# Patient Record
Sex: FEMALE | Race: WHITE | NOT HISPANIC OR LATINO
[De-identification: names, ages, dates, MRNs, and addresses within clinical notes are randomized per-mention and may not be internally consistent; named-entity substitution may affect disease eponyms.]

---

## 2018-04-23 ENCOUNTER — RESULT REVIEW (OUTPATIENT)
Age: 78
End: 2018-04-23

## 2018-11-03 ENCOUNTER — RECORD ABSTRACTING (OUTPATIENT)
Age: 78
End: 2018-11-03

## 2018-11-03 DIAGNOSIS — C44.90 UNSPECIFIED MALIGNANT NEOPLASM OF SKIN, UNSPECIFIED: ICD-10-CM

## 2018-11-03 DIAGNOSIS — Z78.9 OTHER SPECIFIED HEALTH STATUS: ICD-10-CM

## 2018-11-03 DIAGNOSIS — Z80.3 FAMILY HISTORY OF MALIGNANT NEOPLASM OF BREAST: ICD-10-CM

## 2018-11-05 ENCOUNTER — APPOINTMENT (OUTPATIENT)
Dept: PULMONOLOGY | Facility: CLINIC | Age: 78
End: 2018-11-05
Payer: MEDICARE

## 2018-11-05 VITALS
HEART RATE: 55 BPM | OXYGEN SATURATION: 100 % | DIASTOLIC BLOOD PRESSURE: 79 MMHG | HEIGHT: 64 IN | WEIGHT: 130 LBS | BODY MASS INDEX: 22.2 KG/M2 | SYSTOLIC BLOOD PRESSURE: 158 MMHG | RESPIRATION RATE: 14 BRPM

## 2018-11-05 PROCEDURE — 94729 DIFFUSING CAPACITY: CPT

## 2018-11-05 PROCEDURE — 71046 X-RAY EXAM CHEST 2 VIEWS: CPT

## 2018-11-05 PROCEDURE — 94727 GAS DIL/WSHOT DETER LNG VOL: CPT

## 2018-11-05 PROCEDURE — 94060 EVALUATION OF WHEEZING: CPT

## 2018-11-05 PROCEDURE — 99214 OFFICE O/P EST MOD 30 MIN: CPT | Mod: 25

## 2018-11-05 RX ORDER — MOMETASONE FUROATE AND FORMOTEROL FUMARATE DIHYDRATE 100; 5 UG/1; UG/1
100-5 AEROSOL RESPIRATORY (INHALATION)
Refills: 0 | Status: DISCONTINUED | COMMUNITY
End: 2018-11-05

## 2018-11-08 ENCOUNTER — APPOINTMENT (OUTPATIENT)
Dept: UROLOGY | Facility: CLINIC | Age: 78
End: 2018-11-08
Payer: MEDICARE

## 2018-11-08 VITALS
DIASTOLIC BLOOD PRESSURE: 90 MMHG | WEIGHT: 130 LBS | HEIGHT: 64 IN | RESPIRATION RATE: 15 BRPM | TEMPERATURE: 97.5 F | OXYGEN SATURATION: 98 % | HEART RATE: 69 BPM | BODY MASS INDEX: 22.2 KG/M2 | SYSTOLIC BLOOD PRESSURE: 160 MMHG

## 2018-11-08 DIAGNOSIS — Z87.440 PERSONAL HISTORY OF URINARY (TRACT) INFECTIONS: ICD-10-CM

## 2018-11-08 DIAGNOSIS — Z86.39 PERSONAL HISTORY OF OTHER ENDOCRINE, NUTRITIONAL AND METABOLIC DISEASE: ICD-10-CM

## 2018-11-08 PROCEDURE — 99204 OFFICE O/P NEW MOD 45 MIN: CPT

## 2018-11-20 LAB
APPEARANCE: CLEAR
BACTERIA: NEGATIVE
BILIRUBIN URINE: NEGATIVE
BLOOD URINE: NEGATIVE
COLOR: YELLOW
GLUCOSE QUALITATIVE U: NEGATIVE MG/DL
HYALINE CASTS: 1 /LPF
KETONES URINE: NEGATIVE
LEUKOCYTE ESTERASE URINE: ABNORMAL
MICROSCOPIC-UA: NORMAL
NITRITE URINE: NEGATIVE
PH URINE: 6.5
PROTEIN URINE: NEGATIVE MG/DL
RED BLOOD CELLS URINE: 2 /HPF
SPECIFIC GRAVITY URINE: 1.02
SQUAMOUS EPITHELIAL CELLS: 2 /HPF
UROBILINOGEN URINE: NEGATIVE MG/DL
WHITE BLOOD CELLS URINE: 10 /HPF

## 2018-11-22 ENCOUNTER — MOBILE ON CALL (OUTPATIENT)
Age: 78
End: 2018-11-22

## 2018-11-25 ENCOUNTER — TRANSCRIPTION ENCOUNTER (OUTPATIENT)
Age: 78
End: 2018-11-25

## 2018-11-26 ENCOUNTER — EMERGENCY (EMERGENCY)
Facility: HOSPITAL | Age: 78
LOS: 1 days | Discharge: ANOTHER HOSPITAL | End: 2018-11-26
Attending: EMERGENCY MEDICINE
Payer: MEDICARE

## 2018-11-26 ENCOUNTER — EMERGENCY (EMERGENCY)
Facility: HOSPITAL | Age: 78
LOS: 1 days | Discharge: HOME | End: 2018-11-26
Attending: EMERGENCY MEDICINE
Payer: MEDICARE

## 2018-11-26 VITALS
SYSTOLIC BLOOD PRESSURE: 177 MMHG | HEART RATE: 62 BPM | RESPIRATION RATE: 16 BRPM | OXYGEN SATURATION: 97 % | TEMPERATURE: 98 F | DIASTOLIC BLOOD PRESSURE: 73 MMHG

## 2018-11-26 VITALS
TEMPERATURE: 98 F | OXYGEN SATURATION: 98 % | DIASTOLIC BLOOD PRESSURE: 113 MMHG | HEART RATE: 66 BPM | WEIGHT: 130.07 LBS | RESPIRATION RATE: 15 BRPM | HEIGHT: 64 IN | SYSTOLIC BLOOD PRESSURE: 201 MMHG

## 2018-11-26 VITALS
SYSTOLIC BLOOD PRESSURE: 175 MMHG | HEART RATE: 76 BPM | RESPIRATION RATE: 18 BRPM | OXYGEN SATURATION: 97 % | DIASTOLIC BLOOD PRESSURE: 92 MMHG

## 2018-11-26 LAB — BACTERIA UR CULT: ABNORMAL

## 2018-11-26 PROCEDURE — 70450 CT HEAD/BRAIN W/O DYE: CPT | Mod: 26

## 2018-11-26 PROCEDURE — 70486 CT MAXILLOFACIAL W/O DYE: CPT

## 2018-11-26 PROCEDURE — 70486 CT MAXILLOFACIAL W/O DYE: CPT | Mod: 26

## 2018-11-26 PROCEDURE — 70450 CT HEAD/BRAIN W/O DYE: CPT

## 2018-11-26 PROCEDURE — 90471 IMMUNIZATION ADMIN: CPT

## 2018-11-26 PROCEDURE — 99284 EMERGENCY DEPT VISIT MOD MDM: CPT | Mod: 25,GC

## 2018-11-26 PROCEDURE — 96374 THER/PROPH/DIAG INJ IV PUSH: CPT | Mod: XU

## 2018-11-26 PROCEDURE — 29125 APPL SHORT ARM SPLINT STATIC: CPT | Mod: GC

## 2018-11-26 PROCEDURE — 99285 EMERGENCY DEPT VISIT HI MDM: CPT | Mod: 25

## 2018-11-26 PROCEDURE — 11042 DBRDMT SUBQ TIS 1ST 20SQCM/<: CPT

## 2018-11-26 PROCEDURE — 90715 TDAP VACCINE 7 YRS/> IM: CPT

## 2018-11-26 RX ORDER — TETANUS TOXOID, REDUCED DIPHTHERIA TOXOID AND ACELLULAR PERTUSSIS VACCINE, ADSORBED 5; 2.5; 8; 8; 2.5 [IU]/.5ML; [IU]/.5ML; UG/.5ML; UG/.5ML; UG/.5ML
0.5 SUSPENSION INTRAMUSCULAR ONCE
Qty: 0 | Refills: 0 | Status: COMPLETED | OUTPATIENT
Start: 2018-11-26 | End: 2018-11-26

## 2018-11-26 RX ORDER — ACETAMINOPHEN 500 MG
650 TABLET ORAL ONCE
Qty: 0 | Refills: 0 | Status: COMPLETED | OUTPATIENT
Start: 2018-11-26 | End: 2018-11-26

## 2018-11-26 RX ORDER — CEFAZOLIN SODIUM 1 G
2000 VIAL (EA) INJECTION ONCE
Qty: 0 | Refills: 0 | Status: COMPLETED | OUTPATIENT
Start: 2018-11-26 | End: 2018-11-26

## 2018-11-26 RX ADMIN — TETANUS TOXOID, REDUCED DIPHTHERIA TOXOID AND ACELLULAR PERTUSSIS VACCINE, ADSORBED 0.5 MILLILITER(S): 5; 2.5; 8; 8; 2.5 SUSPENSION INTRAMUSCULAR at 17:04

## 2018-11-26 RX ADMIN — Medication 100 MILLIGRAM(S): at 19:50

## 2018-11-26 RX ADMIN — Medication 650 MILLIGRAM(S): at 23:30

## 2018-11-26 RX ADMIN — Medication 650 MILLIGRAM(S): at 17:04

## 2018-11-26 NOTE — ED PROVIDER NOTE - CARE PLAN
Principal Discharge DX:	Facial laceration, initial encounter  Secondary Diagnosis:	Facial contusion, initial encounter  Secondary Diagnosis:	Fall, initial encounter

## 2018-11-26 NOTE — CONSULT NOTE ADULT - SUBJECTIVE AND OBJECTIVE BOX
requested  see dictated note  tolerated exploration of left facial/neck penetrating wound. Wound gently packed.  Rec: iv abx/ENT consult, R/O parotid ductal injury.

## 2018-11-26 NOTE — ED ADULT NURSE NOTE - OBJECTIVE STATEMENT
Pt sts slipped on wet leafs while working in her garage, fell into a bush and caused a lac on left side of the face. denies loc/syncope.

## 2018-11-26 NOTE — ED ADULT NURSE NOTE - NSIMPLEMENTINTERV_GEN_ALL_ED
Implemented All Fall Risk Interventions:  [place] to call system. Call bell, personal items and telephone within reach. Instruct patient to call for assistance. Room bathroom lighting operational. Non-slip footwear when patient is off stretcher. Physically safe environment: no spills, clutter or unnecessary equipment. Stretcher in lowest position, wheels locked, appropriate side rails in place. Provide visual cue, wrist band, yellow gown, etc. Monitor gait and stability. Monitor for mental status changes and reorient to person, place, and time. Review medications for side effects contributing to fall risk. Reinforce activity limits and safety measures with patient and family.

## 2018-11-26 NOTE — ED PROVIDER NOTE - OBJECTIVE STATEMENT
78 y female presents with trip and fall at home today, states she was in her garden, slipped and fell onto large branch of a flower bush.  struck her left cheek on branch, sustained laceration, denies loc.  requesting plastic surgeon.  need tetanus. patient does not have any other complaints.  pmd Dr Gooden  nonsmoker.  PMH:  Hashimoto's thyroiditis    Hypertension    Interstitial cystitis    Cortes syndrome    Osteopenia    Spinal stenosis    Torn meniscus

## 2018-11-26 NOTE — ED PROVIDER NOTE - PROGRESS NOTE DETAILS
spoke with Dr Lopez, case discussed, is going to another case now, advised please inform patient will be a few hours patient informed. Dr Lopez into to see patient, advised consult ENT, for possible parotid duct injury/laceration.  call out to Dr Ochoa, ENT, awaiting call back Dr Lopez in to see patient, advised consult ENT, for possible parotid duct injury/laceration.  call out to Dr Ochoa, ENT, awaiting call back spoke with Dr Bell, ENT, case discussed, will speak with Dr Ochoa, will call back spoke with Dr Allison, case discussed, advised transfer patient spoke with Transfer Center, will call me back spoke with Dr Manley ENT, [facility], through transfer center, requesting plastic surgery see patient in ED at [facility], then will consult if called.  awaiting call back from transfer center. Dr Mendez spoke with Dr Dr Hooper plastic surgeon and Dr Manley ENT, stated patient can be seen in ENT clinic, they are not accepting the patient, laceration should be closed by plastic surgeon.  Call out to Dr Lopez. Dr Mendez spoke with Dr Mendoza , advised can call ENT again , rediscuss case importance.  Dr Mendez spoke with Dr Lopez, advised call trauma team at [place] to accept patient  Dr Mendez spoke with transfer center, spoke with Dr Morley trauma surgeon, [place], accepted patient, Allison BAKER spoke with Dr Henriquez, ED Dr at [place], accepted patient.

## 2018-11-26 NOTE — ED PROVIDER NOTE - ATTENDING CONTRIBUTION TO CARE
Pt seen and examined and d/w PA.  agree with a and p.  pt is a 77 yo female sp trip and fall into bush and inpaled into left face/cheek toward neck.  no active bleeding at present, denies loc, neck pain, numbness or weakness or any other injury.  plastics consulted, ct maxillofacial.  after seen by plastics, concern for parotid duct injury. pt given abx,  ent called but state unable to deal with parotid injury.  tranfer center called and ent and plastics refused transfer.  trauma service contacted and ct scan and wound discussed. pt accepted for transfer to [place] trauma service for futher eval and tx

## 2018-11-27 VITALS
RESPIRATION RATE: 18 BRPM | OXYGEN SATURATION: 97 % | TEMPERATURE: 98 F | HEART RATE: 88 BPM | DIASTOLIC BLOOD PRESSURE: 82 MMHG | SYSTOLIC BLOOD PRESSURE: 153 MMHG

## 2018-11-27 DIAGNOSIS — S01.81XA LACERATION WITHOUT FOREIGN BODY OF OTHER PART OF HEAD, INITIAL ENCOUNTER: ICD-10-CM

## 2018-11-27 PROCEDURE — 99284 EMERGENCY DEPT VISIT MOD MDM: CPT | Mod: 25

## 2018-11-27 PROCEDURE — 73130 X-RAY EXAM OF HAND: CPT | Mod: 26,RT

## 2018-11-27 PROCEDURE — 29125 APPL SHORT ARM SPLINT STATIC: CPT | Mod: RT

## 2018-11-27 PROCEDURE — 73130 X-RAY EXAM OF HAND: CPT

## 2018-11-27 PROCEDURE — 96374 THER/PROPH/DIAG INJ IV PUSH: CPT | Mod: XU

## 2018-11-27 RX ORDER — ACETAMINOPHEN 500 MG
1000 TABLET ORAL ONCE
Qty: 0 | Refills: 0 | Status: COMPLETED | OUTPATIENT
Start: 2018-11-27 | End: 2018-11-27

## 2018-11-27 RX ADMIN — Medication 400 MILLIGRAM(S): at 02:31

## 2018-11-27 NOTE — ED PROVIDER NOTE - PHYSICAL EXAMINATION
Packed wound on left neck/ face. Packed wound on left neck/ face.  Attending Natasha Holcomb: Gen: NAD, heent:laceration to left lower face s/p packing with surrounding ecchymoses, eomi, perrla, mmm, op pink, uvula midline, neck; nttp, ecchymoses to left neck, no crepitus, chest: nttp, no crepitus, cv: rrr, no murmurs, lungs: ctab, abd: soft, nontender, nondistended, no peritoneal signs, +BS, no guarding, ext: wwp, ttp right hands, skin: no rash, neuro: awake and alert, following commands, speech clear, sensation and strength intact, no focal deficits

## 2018-11-27 NOTE — ED PROVIDER NOTE - CARE PLAN
Principal Discharge DX:	Facial trauma Principal Discharge DX:	Facial trauma  Secondary Diagnosis:	Facial laceration

## 2018-11-27 NOTE — ED PROVIDER NOTE - MEDICAL DECISION MAKING DETAILS
ENT, Xray of rt. hand. ENT, Xray of rt. hand.  Attending Natasha Holcomb: 79 y/o female transferred from [facility] for concern for sig facial laceration and parotid gland injury. received abx with wash out of area prior to arrival. pt seen by ent and plastics at [facility]. per ENT no acute surgical intervention at this time. seen by platmirna who repaired laceration and trauma. pt given abx prior to arrival. will update tetanus. abdomen soft on exam. will d/w trauma, may require admission for pain control vs d/c

## 2018-11-27 NOTE — ED PROVIDER NOTE - OBJECTIVE STATEMENT
78F transferred for ENT f/u concerning for salivary duct laceration. Pt had a fall at home earlier today, s/p evaluated and packed by plastic at [facility], sent in with concern for salivary duct laceration. Pt now c/o of rt. hand pain/ swelling. Pt received tetanus shot. 78F transferred for ENT f/u concerning for salivary duct laceration. Pt had a fall at home earlier today, s/p evaluated and packed by plastic at [facility], sent in with concern for salivary duct laceration. Pt now c/o of rt. hand pain/ swelling. Pt received tetanus shot. Pt endorsed to pain over her face and rt. hand.

## 2018-11-27 NOTE — ED ADULT NURSE NOTE - OBJECTIVE STATEMENT
pt arrived as transfer from [facility] via [facility] ambulance for Plastic Service consult; pt is s/p fall at home at about 3 pm when when she slipped on wet leaves outside and fell; pt has lac and swelling left cheek near mouth; pt states she fell into a bush and branches on the bush caused the wound; pt has ecchymosis on left side of neck; pt is fully alert and oriented; son at bedside; pt has hx 2 cardiac stents, htn and thyroid disease; takes 81 mg asa daily; no other anticoagulants; pt also has right hand swelling; pt currently being treated with antibiotic for UTI; arrives with #20 guage saline lock in place to left top of hand; resting in bed with hob elevated

## 2018-11-27 NOTE — PROGRESS NOTE ADULT - SUBJECTIVE AND OBJECTIVE BOX
Transferred from [facility] out of concern for parotid duct injury.  Open wound not in vicinity of parotid gland or duct  Washed out and closed bedside  Also has right 4th metacarpal fracture    Plan Splint outpt FU

## 2018-11-27 NOTE — ED PROVIDER NOTE - PROGRESS NOTE DETAILS
Attending Natasha Holcomb: d/w ENT who do not feel any acute intervention pt seen by plastics who repaired laceration, given ancef prior to arrival Attending Natasha Holcomb: pt feeling fatigued. offered admission for pain control and monitoring. pt prefers to be discharged. does not want to stay in the hospital as she feels had a long day and does not want to end up in a holding section of the ED or wait for a bed upstairs. d/w pt concern as pt states feels weak and everything hurts. pt;s family will stay with her. given rx for abx. given follow up information and close return precautions. will d/c

## 2018-11-27 NOTE — CONSULT NOTE ADULT - SUBJECTIVE AND OBJECTIVE BOX
Trauma Surgery Consult  Consulting surgical team: [facility]  Consulting attending: Indigo    HPI: 78F Hx Htn, hypothyroidism, HLD, presents after a fall today. She was walking out of her house when she lost her footing and fell face-forward into bushes. When she got up she noticed her left cheek was gushing blood and went to the ED. She did not lose consciousness or it her head. A branch of the bush cut her face.  No n/v/d/c/sob.  Her left knee and right wrist hurts.       PAST MEDICAL HISTORY:  Hypertension  Torn meniscus  Osteopenia  Interstitial cystitis  Cortes syndrome  Hashimoto's thyroiditis  Spinal stenosis      PAST SURGICAL HISTORY:  Meniscus tear  H/O laminectomy      MEDICATIONS:  Lisinopril-HCTZ 20-25  Synthroid 75  crestor 10  Metoprolol 25 BID  ASA 81  Amoxicillin for UTI      ALLERGIES:  codeine (Nausea)  morphine (Blisters)      VITALS & I/Os:  Vital Signs Last 24 Hrs  T(C): 36.6 (26 Nov 2018 20:51), Max: 36.6 (26 Nov 2018 20:51)  T(F): 97.8 (26 Nov 2018 20:51), Max: 97.8 (26 Nov 2018 20:51)  HR: 76 (26 Nov 2018 23:36) (62 - 76)  BP: 175/92 (26 Nov 2018 23:36) (175/92 - 222/94)  BP(mean): --  RR: 18 (26 Nov 2018 23:36) (15 - 18)  SpO2: 97% (26 Nov 2018 23:36) (97% - 98%)    I&O's Summary      PHYSICAL EXAM:  General: No acute distress. Left cheek laceration  Respiratory: Nonlabored  Cardiovascular: RRR  Abdominal: Soft, nondistended, nontender.   Extremities: Warm, right dorsal wrist bruising with tenderness. Left knee bruise.      IMAGING:  < from: CT Head No Cont (11.26.18 @ 17:34) >  MPRESSION:    NONCONTRAST HEAD CT: No acute intracranial hemorrhage, mass effect, or   shift of the midline structures.    NONCONTRAST MAXILLOFACIAL CT: No acute facial fractures.    Left-sided soft tissue laceration involving the neck with associated   bruising and hematoma formation with multiple foci of gas tracking   throughout the fascial planes of the left neck and cheek.    No radiopaque foreign bodies.    < end of copied text >      < from: Xray Hand 3 Views, Right (11.27.18 @ 01:10) >    INTERPRETATION:  Acute, spiral mildly displaced fracture of the fourth   metacarpal diaphysis.  Followup official report.    < end of copied text >

## 2018-11-27 NOTE — CONSULT NOTE ADULT - SUBJECTIVE AND OBJECTIVE BOX
CC: left salivary duct eval.    HPI:  78F transferred for ENT f/u concerning for salivary duct laceration. Pt had a fall at home earlier today, s/p evaluated and packed by plastic at [facility], sent in with concern for salivary duct laceration. Pt now c/o of rt. hand pain/ swelling. Pt received tetanus shot.    PAST MEDICAL & SURGICAL HISTORY:  Hypertension  Torn meniscus  Osteopenia  Interstitial cystitis  Cortes syndrome  Hashimoto's thyroiditis  Spinal stenosis  Meniscus tear  H/O laminectomy    Allergies    morphine (Blisters)    Intolerances    codeine (Nausea)    MEDICATIONS  (STANDING):    MEDICATIONS  (PRN):      Social History: no tobacco, no etoh      Family history: Pt denies any sign FHx    ROS:   ENT: all negative except as noted in HPI   CV: denies palpitations  Pulm: denies SOB, cough, hemoptysis  GI: denies change in apetite, indigestion, n/v  : denies pertinent urinary symptoms, urgency  Neuro: denies numbness/tingling, loss of sensation  Psych: denies anxiety  MS: denies muscle weakness, instability  Heme: denies easy bruising or bleeding  Endo: denies heat/cold intolerance, excessive sweating  Vascular: denies LE edema    Vital Signs Last 24 Hrs  T(C): 36.6 (26 Nov 2018 20:51), Max: 36.6 (26 Nov 2018 20:51)  T(F): 97.8 (26 Nov 2018 20:51), Max: 97.8 (26 Nov 2018 20:51)  HR: 76 (26 Nov 2018 23:36) (62 - 76)  BP: 175/92 (26 Nov 2018 23:36) (175/92 - 222/94)  BP(mean): --  RR: 18 (26 Nov 2018 23:36) (15 - 18)  SpO2: 97% (26 Nov 2018 23:36) (97% - 98%)              PHYSICAL EXAM:  Gen: NAD  Skin: No rashes, bruises, or lesions  Head: Normocephalic, left salivary duct wound, packed at prior hospital  Face: no edema, erythema, or fluctuance. Parotid glands soft without mass  Eyes: no scleral injection  Nose: Nares bilaterally patent, no discharge  Mouth: No Stridor / Drooling / Trismus.  Mucosa moist, tongue/uvula midline, oropharynx clear  Neck: Flat, supple, no lymphadenopathy, trachea midline, no masses  Lymphatic: No lymphadenopathy  Resp: breathing easily, no stridor  CV: no peripheral edema/cyanosis  GI: nondistended   Peripheral vascular: no JVD or edema  Neuro: facial nerve intact, no facial droop          IMAGING/ADDITIONAL STUDIES: < from: CT Maxillofacial No Cont (11.26.18 @ 17:34) >  NONCONTRAST MAXILLOFACIAL CT: No acute facial fractures.    Left-sided soft tissue laceration involving the neck with associated   bruising and hematoma formation with multiple foci of gas tracking   throughout the fascial planes of the left neck and cheek.    No radiopaque foreign bodies.      < end of copied text > CC: left salivary duct eval.    HPI:  78F transferred for ENT f/u concerning for salivary duct laceration. Pt had a fall at home earlier today, s/p evaluated and packed by plastic at [facility], sent in with concern for salivary duct laceration. Pt now c/o of rt. hand pain/ swelling. Pt received tetanus shot.    PAST MEDICAL & SURGICAL HISTORY:  Hypertension  Torn meniscus  Osteopenia  Interstitial cystitis  Cortes syndrome  Hashimoto's thyroiditis  Spinal stenosis  Meniscus tear  H/O laminectomy    Allergies    morphine (Blisters)    Intolerances    codeine (Nausea)    MEDICATIONS  (STANDING):    MEDICATIONS  (PRN):      Social History: no tobacco, no etoh      Family history: Pt denies any sign FHx    ROS:   ENT: all negative except as noted in HPI   CV: denies palpitations  Pulm: denies SOB, cough, hemoptysis  GI: denies change in apetite, indigestion, n/v  : denies pertinent urinary symptoms, urgency  Neuro: denies numbness/tingling, loss of sensation  Psych: denies anxiety  MS: denies muscle weakness, instability  Heme: denies easy bruising or bleeding  Endo: denies heat/cold intolerance, excessive sweating  Vascular: denies LE edema    Vital Signs Last 24 Hrs  T(C): 36.6 (26 Nov 2018 20:51), Max: 36.6 (26 Nov 2018 20:51)  T(F): 97.8 (26 Nov 2018 20:51), Max: 97.8 (26 Nov 2018 20:51)  HR: 76 (26 Nov 2018 23:36) (62 - 76)  BP: 175/92 (26 Nov 2018 23:36) (175/92 - 222/94)  BP(mean): --  RR: 18 (26 Nov 2018 23:36) (15 - 18)  SpO2: 97% (26 Nov 2018 23:36) (97% - 98%)              PHYSICAL EXAM:  Gen: NAD  Skin: No rashes, bruises, or lesions  Head: Normocephalic  Face: left facial wound on nasolabial fold 1inch, not in pathway of stensons duct, no other abrasions  Eyes: no scleral injection  Nose: Nares bilaterally patent, no discharge  Mouth: No Stridor / Drooling / Trismus.  Mucosa moist, tongue/uvula midline, oropharynx clear  Neck: +left neck ecchymosis, no crepitus or emphysema noted. no abrasions  Lymphatic: No lymphadenopathy  Resp: breathing easily, no stridor  CV: no peripheral edema/cyanosis  GI: nondistended   Peripheral vascular: no JVD or edema  Neuro: facial nerve intact, no facial droop, + pt is numb due to novacaine for packing          IMAGING/ADDITIONAL STUDIES: < from: CT Maxillofacial No Cont (11.26.18 @ 17:34) >  NONCONTRAST MAXILLOFACIAL CT: No acute facial fractures.    Left-sided soft tissue laceration involving the neck with associated   bruising and hematoma formation with multiple foci of gas tracking   throughout the fascial planes of the left neck and cheek.    No radiopaque foreign bodies.      < end of copied text >

## 2018-11-27 NOTE — CONSULT NOTE ADULT - ASSESSMENT
78y with left salivary wound lac with packing in place by plastics by PV. pt with good facial movement and sensation 78y with left facial lac with packing in place by plastics by PV. Facial laceration clear from stensons duct pathway.

## 2018-11-27 NOTE — CONSULT NOTE ADULT - PROBLEM SELECTOR RECOMMENDATION 9
Pt discussed in detail with Dr. Vega, plan to follow up at [facility] ENT clinic in 2 weeks. Call [phone] to make appointment.  no intevention required for the duct at this time  the wound needs to be followed by Plastics

## 2018-11-27 NOTE — CONSULT NOTE ADULT - ASSESSMENT
78F Hx Htn, hypothyroidism, HLD, presents after a fall today with left cheek laceration and right wrist injury. Concern for parotid injury prompted transfer to [facility].  Laceration repaired by plastics in ED.     - F/u plastic surgery recs on finger fracture and f/u official report  - Xray left knee  - No trauma surgical interventions at this time  - Tertiary to follow  - To be discussed with attending    ATP  [id number]

## 2018-11-27 NOTE — ED ADULT NURSE NOTE - NSIMPLEMENTINTERV_GEN_ALL_ED
Implemented All Fall with Harm Risk Interventions:  [place] to call system. Call bell, personal items and telephone within reach. Instruct patient to call for assistance. Room bathroom lighting operational. Non-slip footwear when patient is off stretcher. Physically safe environment: no spills, clutter or unnecessary equipment. Stretcher in lowest position, wheels locked, appropriate side rails in place. Provide visual cue, wrist band, yellow gown, etc. Monitor gait and stability. Monitor for mental status changes and reorient to person, place, and time. Review medications for side effects contributing to fall risk. Reinforce activity limits and safety measures with patient and family. Provide visual clues: red socks.

## 2018-11-27 NOTE — ED PROVIDER NOTE - ATTENDING CONTRIBUTION TO CARE
Attending MD Natasha Holcomb:  I personally have seen and examined this patient.  Resident note reviewed and agree on plan of care and except where noted.  See HPI, PE, and MDM for details.

## 2018-11-27 NOTE — ED ADULT NURSE NOTE - PMH
Hashimoto's thyroiditis    Hypertension    Interstitial cystitis    Cortes syndrome    Osteopenia    Spinal stenosis    Torn meniscus

## 2018-12-17 ENCOUNTER — APPOINTMENT (OUTPATIENT)
Dept: ORTHOPEDIC SURGERY | Facility: CLINIC | Age: 78
End: 2018-12-17
Payer: MEDICARE

## 2018-12-17 ENCOUNTER — MESSAGE (OUTPATIENT)
Age: 78
End: 2018-12-17

## 2018-12-17 DIAGNOSIS — S62.354D NONDISPLACED FRACTURE OF SHAFT OF FOURTH METACARPAL BONE, RIGHT HAND, SUBSEQUENT ENCOUNTER FOR FRACTURE WITH ROUTINE HEALING: ICD-10-CM

## 2018-12-17 LAB
APPEARANCE: CLEAR
BACTERIA UR CULT: ABNORMAL
BACTERIA: NEGATIVE
BILIRUBIN URINE: NEGATIVE
BLOOD URINE: NEGATIVE
COLOR: YELLOW
GLUCOSE QUALITATIVE U: NEGATIVE MG/DL
KETONES URINE: NEGATIVE
LEUKOCYTE ESTERASE URINE: NEGATIVE
MICROSCOPIC-UA: NORMAL
NITRITE URINE: NEGATIVE
PH URINE: 6.5
PROTEIN URINE: NEGATIVE MG/DL
RED BLOOD CELLS URINE: 2 /HPF
SPECIFIC GRAVITY URINE: 1.01
SQUAMOUS EPITHELIAL CELLS: 0 /HPF
UROBILINOGEN URINE: NEGATIVE MG/DL
WHITE BLOOD CELLS URINE: 0 /HPF

## 2018-12-17 PROCEDURE — 29125 APPL SHORT ARM SPLINT STATIC: CPT | Mod: RT

## 2018-12-17 PROCEDURE — 99203 OFFICE O/P NEW LOW 30 MIN: CPT | Mod: 25

## 2018-12-17 RX ORDER — NITROFURANTOIN MACROCRYSTALS 100 MG/1
100 CAPSULE ORAL
Qty: 14 | Refills: 0 | Status: DISCONTINUED | COMMUNITY
Start: 2018-11-08 | End: 2018-12-17

## 2019-01-08 ENCOUNTER — APPOINTMENT (OUTPATIENT)
Dept: ORTHOPEDIC SURGERY | Facility: CLINIC | Age: 79
End: 2019-01-08

## 2019-01-10 ENCOUNTER — APPOINTMENT (OUTPATIENT)
Dept: UROLOGY | Facility: CLINIC | Age: 79
End: 2019-01-10
Payer: MEDICARE

## 2019-01-10 VITALS
RESPIRATION RATE: 16 BRPM | HEIGHT: 64 IN | DIASTOLIC BLOOD PRESSURE: 80 MMHG | OXYGEN SATURATION: 98 % | BODY MASS INDEX: 22.2 KG/M2 | SYSTOLIC BLOOD PRESSURE: 130 MMHG | TEMPERATURE: 98.3 F | HEART RATE: 74 BPM | WEIGHT: 130 LBS

## 2019-01-10 DIAGNOSIS — M79.18 MYALGIA, OTHER SITE: ICD-10-CM

## 2019-01-10 DIAGNOSIS — N39.0 URINARY TRACT INFECTION, SITE NOT SPECIFIED: ICD-10-CM

## 2019-01-10 DIAGNOSIS — M62.89 OTHER SPECIFIED DISORDERS OF MUSCLE: ICD-10-CM

## 2019-01-10 PROCEDURE — 99214 OFFICE O/P EST MOD 30 MIN: CPT

## 2019-01-10 NOTE — HISTORY OF PRESENT ILLNESS
[FreeTextEntry1] : 77yo female with cc of bladder spasm and UTI. Pt reports over the last week she has had new bladder pain and problems. She started with a burning and a feeling like she needed to void. She had burning both with urination and otherwise. She went to see Dr Collins and  was started on multiple vaginal creams. She has not felt better with this. Had UA that showed pyruria and UCx showed enterocccus (R to cipro). Treated with amox but didn’t feel better. She was treated with macrobid by me. She developed UTI sx again and again UCx with enterococcus now dx with augmentin. She developed sx again and went to urgent care and was treated with PCN per report. UCx showed enterococcus. She now again with sx and went to [facility] earlier this month. UDip shows bili, asc and leuks. Her cx showed 60k enterococcuis and 20K klebsiella. She was placed on augmentin which she is taking. She notes that abx help with spasm feeling. \par \par Pt reports hx of IC and underwent a hydrodistension and bladder bx and was told there was atypia. 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 She has baseline urinary frequency. She was seen by [facility] colleague and has been given anticholinergic/B agonist in the past and had retention.

## 2019-01-10 NOTE — REVIEW OF SYSTEMS
[Genital bacterial infection] : genital bacterial infection [Date of last menstrual period ____] : date of last menstrual period: [unfilled] [Bladder pressure] : experiences bladder pressure [Leakage of urine with urgency] : leakage of urine with urgency [Negative] : Heme/Lymph [FreeTextEntry2] : high blood pressure

## 2019-01-10 NOTE — ASSESSMENT
[FreeTextEntry1] : Still unclear if this is true bacterial UTI persistence vs IC flare. Difficult to tell without UA. \par --Augmentin x2 weeks total\par --Repeat UA/UCx one week after completion\par --If cx negative or cx positive but no pyuria, will treat for for IC/CPPS\par \par Suspect baseline pelvic floor dysfunction leading to urinary sx (and retention when treated with bladder relaxant). Also likely to be exacerbating current sx from UTI. \par --Avoid strain\par --Warm baths\par --IC diet\par

## 2019-01-10 NOTE — PHYSICAL EXAM
[General Appearance - Well Developed] : well developed [General Appearance - Well Nourished] : well nourished [General Appearance - In No Acute Distress] : no acute distress [Abdomen Soft] : soft [Abdomen Tenderness] : non-tender [Costovertebral Angle Tenderness] : no ~M costovertebral angle tenderness [Skin Color & Pigmentation] : normal skin color and pigmentation [Edema] : no peripheral edema [Exaggerated Use Of Accessory Muscles For Inspiration] : no accessory muscle use [Oriented To Time, Place, And Person] : oriented to person, place, and time [Normal Station and Gait] : the gait and station were normal for the patient's age [No Focal Deficits] : no focal deficits [FreeTextEntry1] : R foot brace

## 2019-01-29 ENCOUNTER — APPOINTMENT (OUTPATIENT)
Dept: UROGYNECOLOGY | Facility: CLINIC | Age: 79
End: 2019-01-29
Payer: MEDICARE

## 2019-01-29 VITALS
DIASTOLIC BLOOD PRESSURE: 83 MMHG | WEIGHT: 130 LBS | HEIGHT: 64 IN | SYSTOLIC BLOOD PRESSURE: 164 MMHG | BODY MASS INDEX: 22.2 KG/M2 | HEART RATE: 63 BPM

## 2019-01-29 DIAGNOSIS — R39.13 SPLITTING OF URINARY STREAM: ICD-10-CM

## 2019-01-29 LAB
BILIRUB UR QL STRIP: NORMAL
CLARITY UR: CLEAR
COLLECTION METHOD: NORMAL
GLUCOSE UR-MCNC: NORMAL
HCG UR QL: 0.2 EU/DL
HGB UR QL STRIP.AUTO: NORMAL
KETONES UR-MCNC: NORMAL
LEUKOCYTE ESTERASE UR QL STRIP: NORMAL
NITRITE UR QL STRIP: NORMAL
PH UR STRIP: 7
PROT UR STRIP-MCNC: NORMAL
SP GR UR STRIP: 1.01

## 2019-01-29 PROCEDURE — 99204 OFFICE O/P NEW MOD 45 MIN: CPT | Mod: 25

## 2019-01-29 PROCEDURE — 81003 URINALYSIS AUTO W/O SCOPE: CPT | Mod: QW

## 2019-01-29 PROCEDURE — 51701 INSERT BLADDER CATHETER: CPT

## 2019-01-29 NOTE — ASSESSMENT
[FreeTextEntry1] : 78 year old with atrophy and recent multiple voided specimens treated for 'uti' with no effect.  catheterized urine is 100% clear. Impression dysuria due to atrophy and voided specimens contaminated with narrow vagina. \par \par Recommend estrace/lotrisone regimen, only catheter specimens.  .  Risks and benefits reviewed. \par \par Return 3 months

## 2019-01-29 NOTE — HISTORY OF PRESENT ILLNESS
[FreeTextEntry1] : 78 year old  with vaginal dryness off estrogen for some time which was effective previously. \par \par Recurrent 'uti' with voided specimens and no improvement with multiple abx treatments\par \par Frequency , urgency\par \par Interval history hysterectomy 2015 for adnexal cyst.  All Benign

## 2019-01-29 NOTE — REVIEW OF SYSTEMS
[All Other ROS] : all other reviewed systems are negative [FreeTextEntry1] : Please see questionnair for positives. Pneumonia, bronchitis, cardiac, thyroid

## 2019-01-29 NOTE — PHYSICAL EXAM
[No Acute Distress] : in no acute distress [Well developed] : well developed [Well Nourished] : ~L well nourished [Good Hygeine] : demonstrates good hygeine [Oriented x3] : oriented to person, place, and time [Normal Memory] : ~T memory was ~L unimpaired [Normal Mood/Affect] : mood and affect are normal [Supple] : ~T the neck demonstrated no ~M decrease in suppleness [Thyroid Normal] : the thyroid ~T showed no abnormalities [Symmetrical] : the neck was ~L symmetrical [Supraclavicular LAD] : adenopathy noted in supraclavicular lymph nodes [Warm and Dry] : was warm and dry to touch [Turgor Normal] : skin turgor ~T was normal [Normal Gait] : gait was normal [No Joint Swelling] : there was no joint swelling [No Clubbing, Cyanosis] : no clubbing or cyanosis of the fingernails [Normal Strength/Tone] : muscle strength and tone were normal [No Invol. Movements] : no involuntary movements were seen [Vulvar Atrophy] : vulvar atrophy [Labia Majora Erythema] : erythema [Labia Minora Erythema] : erythema [Bartholin's Gland] : both Bartholin's glands were normal  [Normal Appearance] : general appearance was normal [] : 0 [Absent] : absent [Uterine Adnexae] : were not tender and not enlarged [Post Void Residual ____ml] : post void residual via catheterization was [unfilled] ml [Exam Deferred] : was deferred [Normal] : was normal [None] : no [Anxiety] : patient is not anxious [Cough] : no cough [Dyspnea] : no dyspnea [Mass (___ Cm)] : no ~M [unfilled] abdominal mass was palpated [Tenderness] : ~T no ~M abdominal tenderness observed [Distended] : not distended [H/Smegaly] : no hepatosplenomegaly [Hernia] : no hernia observed [Performed?] : was not performed [Rash/Lesion] : no rash or lesion was noted [Estrogen Effect] : no estrogen effect was observed [FreeTextEntry3] : atrophy [de-identified] : no prolapse [de-identified] : urine completely negative.  cath urine performed

## 2019-01-29 NOTE — LETTER BODY
[Dear  ___] : Dear  [unfilled], [I had the pleasure of evaluating your patient, [unfilled]. Thank you for referring Ms. [unfilled] for consultation for ___] : I had the pleasure of evaluating your patient, [unfilled]. Thank you for referring Ms. [unfilled] for consultation for [unfilled]. [Attached please find my note.] : Attached please find my note. [Thank you very much for allowing me to participate in the care of this patient. If you have any questions, please do not hesitate to contact me] : Thank you very much for allowing me to participate in the care of this patient. If you have any questions, please do not hesitate to contact me.

## 2019-01-30 ENCOUNTER — MEDICATION RENEWAL (OUTPATIENT)
Age: 79
End: 2019-01-30

## 2019-03-04 ENCOUNTER — APPOINTMENT (OUTPATIENT)
Dept: PULMONOLOGY | Facility: CLINIC | Age: 79
End: 2019-03-04

## 2019-03-07 ENCOUNTER — RX RENEWAL (OUTPATIENT)
Age: 79
End: 2019-03-07

## 2019-03-07 ENCOUNTER — APPOINTMENT (OUTPATIENT)
Dept: UROGYNECOLOGY | Facility: CLINIC | Age: 79
End: 2019-03-07
Payer: MEDICARE

## 2019-03-07 LAB
BILIRUB UR QL STRIP: NORMAL
CLARITY UR: NORMAL
COLLECTION METHOD: NORMAL
GLUCOSE UR-MCNC: NORMAL
HCG UR QL: 0.2 EU/DL
HGB UR QL STRIP.AUTO: NORMAL
KETONES UR-MCNC: NORMAL
LEUKOCYTE ESTERASE UR QL STRIP: NORMAL
NITRITE UR QL STRIP: NORMAL
PH UR STRIP: 7
PROT UR STRIP-MCNC: NORMAL
SP GR UR STRIP: 1.01

## 2019-03-07 PROCEDURE — 99214 OFFICE O/P EST MOD 30 MIN: CPT | Mod: 25

## 2019-03-07 PROCEDURE — 51701 INSERT BLADDER CATHETER: CPT

## 2019-03-07 NOTE — HISTORY OF PRESENT ILLNESS
[FreeTextEntry1] : 78 year old w/ h/o IC and recurrent UTI who presents with complaint of UTI today. +dysuria, frequency and urgency. Feels incomplete bladder emptying. Was seen on 1/20/19 and found to have negative urine despite symptoms. Reports compliance with vaginal estrogen cream alternating with lotrisone/zinc oxide.   \par \par PSH: hysterectomy 2015 for adnexal cyst.  All Benign

## 2019-03-07 NOTE — DISCUSSION/SUMMARY
[FreeTextEntry1] : 77 yo with h/o recurrent UTI now with acute UTI symptoms- Udip large leuks. Will start treatment with Macrobid x5 d and follow-up UCx results. \par 1. Recurrent UTI- last + UCx 12/2018. Management options reviewed which includes hydration, vitamin C, cranberry tablets, and PPx low daily dose of Abx. Will start Nitrofurantoin 100mg daily and follow up in 2-3 months. Recommend CT Urogram for further work-up. 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 VVA- continue vaginal estrogen cream as prescribed alternating days with zinc/lotrisone\par All questions answered.

## 2019-03-07 NOTE — PHYSICAL EXAM
[Well developed] : well developed [Well Nourished] : ~L well nourished [Good Hygeine] : demonstrates good hygeine [Labia Majora] : was normal on the right [Labia Minora] : were normal [Normal] : was normal [Estrogen Effect] : estrogen effect was observed [de-identified] : +sebaceous 2mm cyst on left labia [FreeTextEntry3] : n [de-identified] : no prolapse

## 2019-03-11 ENCOUNTER — RESULT REVIEW (OUTPATIENT)
Age: 79
End: 2019-03-11

## 2019-03-11 ENCOUNTER — APPOINTMENT (OUTPATIENT)
Dept: PULMONOLOGY | Facility: CLINIC | Age: 79
End: 2019-03-11
Payer: MEDICARE

## 2019-03-11 VITALS
HEART RATE: 60 BPM | DIASTOLIC BLOOD PRESSURE: 68 MMHG | WEIGHT: 130 LBS | OXYGEN SATURATION: 97 % | SYSTOLIC BLOOD PRESSURE: 185 MMHG | HEIGHT: 64 IN | RESPIRATION RATE: 14 BRPM | BODY MASS INDEX: 22.2 KG/M2

## 2019-03-11 LAB
APPEARANCE: CLEAR
BACTERIA UR CULT: ABNORMAL
BACTERIA: ABNORMAL
BILIRUBIN URINE: NEGATIVE
BLOOD URINE: NEGATIVE
COLOR: NORMAL
GLUCOSE QUALITATIVE U: NEGATIVE
HYALINE CASTS: 0 /LPF
KETONES URINE: NEGATIVE
LEUKOCYTE ESTERASE URINE: ABNORMAL
MICROSCOPIC-UA: NORMAL
NITRITE URINE: POSITIVE
PH URINE: 7
PROTEIN URINE: NEGATIVE
RED BLOOD CELLS URINE: 2 /HPF
SPECIFIC GRAVITY URINE: 1.01
SQUAMOUS EPITHELIAL CELLS: 0 /HPF
UROBILINOGEN URINE: NORMAL
WHITE BLOOD CELLS URINE: 30 /HPF

## 2019-03-11 PROCEDURE — 99213 OFFICE O/P EST LOW 20 MIN: CPT | Mod: 25

## 2019-03-11 PROCEDURE — 94060 EVALUATION OF WHEEZING: CPT

## 2019-03-11 RX ORDER — TRIAMCINOLONE ACETONIDE 1 MG/G
0.1 CREAM TOPICAL
Qty: 15 | Refills: 0 | Status: DISCONTINUED | COMMUNITY
Start: 2018-10-18

## 2019-03-11 RX ORDER — IODOQUINOL, HYDROCORTISONE ACETATE AND ALOE VERA LEAF 10; 20; 10 MG/G; MG/G; MG/G
1-2-1 GEL TOPICAL
Qty: 48 | Refills: 0 | Status: DISCONTINUED | COMMUNITY
Start: 2018-07-18

## 2019-03-11 RX ORDER — BETAMETHASONE DIPROPIONATE 0.5 MG/G
0.05 CREAM, AUGMENTED TOPICAL
Qty: 50 | Refills: 0 | Status: DISCONTINUED | COMMUNITY
Start: 2018-09-13

## 2019-03-11 RX ORDER — AMOXICILLIN AND CLAVULANATE POTASSIUM 500; 125 MG/1; MG/1
500-125 TABLET, FILM COATED ORAL
Qty: 14 | Refills: 0 | Status: DISCONTINUED | COMMUNITY
Start: 2019-01-07

## 2019-03-11 RX ORDER — CLINDAMYCIN HYDROCHLORIDE 300 MG/1
300 CAPSULE ORAL
Qty: 28 | Refills: 0 | Status: DISCONTINUED | COMMUNITY
Start: 2018-11-27

## 2019-03-11 RX ORDER — PENICILLIN V POTASSIUM 500 MG/1
500 TABLET, FILM COATED ORAL
Qty: 14 | Refills: 0 | Status: DISCONTINUED | COMMUNITY
Start: 2018-12-19

## 2019-03-11 RX ORDER — CEFPODOXIME PROXETIL 200 MG/1
200 TABLET, FILM COATED ORAL
Qty: 20 | Refills: 0 | Status: DISCONTINUED | COMMUNITY
Start: 2018-12-15

## 2019-03-11 RX ORDER — AMOXICILLIN AND CLAVULANATE POTASSIUM 875; 125 MG/1; MG/1
875-125 TABLET, COATED ORAL
Qty: 14 | Refills: 0 | Status: DISCONTINUED | COMMUNITY
Start: 2019-01-10 | End: 2019-03-11

## 2019-03-11 RX ORDER — AMOXICILLIN 250 MG/1
250 CAPSULE ORAL
Qty: 21 | Refills: 0 | Status: DISCONTINUED | COMMUNITY
Start: 2018-10-22

## 2019-03-11 RX ORDER — CIPROFLOXACIN HYDROCHLORIDE 500 MG/1
500 TABLET, FILM COATED ORAL
Qty: 4 | Refills: 0 | Status: DISCONTINUED | COMMUNITY
Start: 2018-10-04

## 2019-03-11 NOTE — REVIEW OF SYSTEMS
[Hypertension] : ~T hypertension [As Noted in HPI] : as noted in HPI [Negative] : Endocrine [FreeTextEntry9] : ADÁN

## 2019-03-11 NOTE — DISCUSSION/SUMMARY
[FreeTextEntry1] : Obstructive airway disease Stable\par Other history as noted above\par ongoing UROGYN f/u\par  HTN and PMD appt today\par Vaccination and pneumococcal profile up to date\par 2 puffs q.i.d. p.r.n. rescue therapy Proair\par Advised if needs to use should notify us for evaluation

## 2019-03-11 NOTE — PHYSICAL EXAM
[General Appearance - Well Developed] : well developed [Normal Appearance] : normal appearance [Well Groomed] : well groomed [General Appearance - Well Nourished] : well nourished [No Deformities] : no deformities [General Appearance - In No Acute Distress] : no acute distress [Normal Oropharynx] : normal oropharynx [I] : I [Neck Appearance] : the appearance of the neck was normal [Neck Cervical Mass (___cm)] : no neck mass was observed [Jugular Venous Distention Increased] : there was no jugular-venous distention [Thyroid Diffuse Enlargement] : the thyroid was not enlarged [Heart Rate And Rhythm] : heart rate and rhythm were normal [Heart Sounds] : normal S1 and S2 [Murmurs] : no murmurs present [Arterial Pulses Normal] : the arterial pulses were normal [Edema] : no peripheral edema present [Veins - Varicosity Changes] : no varicosital changes were noted in the lower extremities [Respiration, Rhythm And Depth] : normal respiratory rhythm and effort [Exaggerated Use Of Accessory Muscles For Inspiration] : no accessory muscle use [Auscultation Breath Sounds / Voice Sounds] : lungs were clear to auscultation bilaterally [Chest Palpation] : palpation of the chest revealed no abnormalities [Lungs Percussion] : the lungs were normal to percussion [Abdomen Soft] : soft [Abdomen Tenderness] : non-tender [Abdomen Mass (___ Cm)] : no abdominal mass palpated [Abnormal Walk] : normal gait [Nail Clubbing] : no clubbing of the fingernails [Cyanosis, Localized] : no localized cyanosis [Petechial Hemorrhages (___cm)] : no petechial hemorrhages [Skin Color & Pigmentation] : normal skin color and pigmentation [] : no rash [No Venous Stasis] : no venous stasis [Skin Lesions] : no skin lesions [No Skin Ulcers] : no skin ulcer [No Xanthoma] : no  xanthoma was observed [Deep Tendon Reflexes (DTR)] : deep tendon reflexes were 2+ and symmetric [Sensation] : the sensory exam was normal to light touch and pinprick [No Focal Deficits] : no focal deficits [Oriented To Time, Place, And Person] : oriented to person, place, and time [Impaired Insight] : insight and judgment were intact [Affect] : the affect was normal [Mood] : the mood was normal [FreeTextEntry1] : ecchymosis left eyelid post Opth plastic surgery

## 2019-03-11 NOTE — CONSULT LETTER
[Dear  ___] : Dear  [unfilled], [Courtesy Letter:] : I had the pleasure of seeing your patient, [unfilled], in my office today. [Please see my note below.] : Please see my note below. [Referral Closing:] : Thank you very much for seeing this patient.  If you have any questions, please do not hesitate to contact me. [Sincerely,] : Sincerely, [FreeTextEntry3] : Bharat Arguelles D.O., JYOTI\par   of Medicine\par [facility] [employer]\par

## 2019-03-11 NOTE — HISTORY OF PRESENT ILLNESS
[Stable] : are stable [None] : ~He/She~ has no significant interval events [Difficulty Breathing During Exertion] : denies dyspnea on exertion [Feelings Of Weakness On Exertion] : denies exercise intolerance [Cough] : denies coughing [Wheezing] : denies wheezing [Regional Soft Tissue Swelling Both Lower Extremities] : denies lower extremity edema [Chest Pain Or Discomfort] : denies chest pain [Fever] : denies fever [Wt Gain ___ Lbs] : no recent weight gain [Wt Loss ___ Lbs] : no recent weight loss [Oxygen] : the patient uses no supplemental oxygen [FreeTextEntry1] : Currently with very minimal Achilles tendon strain and inability\par No active respiratory symptoms\par Denies any cough sputum hemoptysis\par No fevers chills or sweats\par Exertional chest pain\par Upper chest pain\par No active wheeze chest congestion\par Past medical history hypertension hyperlipidemia hypothyroidism\par No active tobacco use

## 2019-03-11 NOTE — PROCEDURE
[FreeTextEntry1] : States Flu vaccination up-to-date\par History of pneumococcal vaccination and Prevnar administered\par \par PFT Nov 5 2018\par Preserved flow rates\par Moderate obstructive ventilatory impairment\par 60% response to inhaled bronchodilator the FEV1\par No evidence of air trapping\par Diffusion normal 84% pred\par \par CXR PA Lateral Nov 5 2018\par And cardiomegaly lung fields clear without chani infiltrate, prominent pulmonary nodule, pleural effusion, pneumothorax.\par Mediastinum hilum normal\par Soft tissue bony structures grossly normal\par \par Spirometry 3/11/2019\par  Minimal OAD\par  No BD at FEV1\par

## 2019-04-12 ENCOUNTER — MEDICATION RENEWAL (OUTPATIENT)
Age: 79
End: 2019-04-12

## 2019-04-25 ENCOUNTER — RESULT REVIEW (OUTPATIENT)
Age: 79
End: 2019-04-25

## 2019-05-06 ENCOUNTER — APPOINTMENT (OUTPATIENT)
Dept: PULMONOLOGY | Facility: CLINIC | Age: 79
End: 2019-05-06

## 2019-05-09 ENCOUNTER — APPOINTMENT (OUTPATIENT)
Dept: UROGYNECOLOGY | Facility: CLINIC | Age: 79
End: 2019-05-09
Payer: MEDICARE

## 2019-05-09 PROCEDURE — 99214 OFFICE O/P EST MOD 30 MIN: CPT

## 2019-05-09 NOTE — HISTORY OF PRESENT ILLNESS
[FreeTextEntry1] : 78 year old with history of IC, hx abnormal bladder cytology in 200 followed regularly by urology at [facility] Dr Ruffin. recent cysto and upper tract imaging.\par \par She has improved vaginal symptoms and improved dysuria on estrogen cream and lotrisone. No uti symptoms today

## 2019-05-09 NOTE — COUNSELING
[FreeTextEntry1] : 78 year old with history of IC, hx abnormal bladder cytology in 200 followed regularly by urology at [facility] Dr Ruffin. recent cysto and upper tract imaging.\par \par She has improved vaginal symptoms and improved dysuria on estrogen cream and lotrisone. No uti symptoms today\par \par \par Recommend continue lotrisone and estrogen cream.  If UTI symptoms to be accommodated by any practitioner for cath specimen

## 2019-11-25 ENCOUNTER — APPOINTMENT (OUTPATIENT)
Dept: UROGYNECOLOGY | Facility: CLINIC | Age: 79
End: 2019-11-25
Payer: MEDICARE

## 2019-11-25 PROCEDURE — 99213 OFFICE O/P EST LOW 20 MIN: CPT

## 2019-11-25 PROCEDURE — 81003 URINALYSIS AUTO W/O SCOPE: CPT | Mod: NC,QW

## 2019-11-25 NOTE — DISCUSSION/SUMMARY
[FreeTextEntry1] : Urine dip negative.  PT may take Uristat as needed.  Drink fluids.  Continue use of the Estrace cream.  May apply some zinc oxide to vulva and labia.  If pt have any problems/concern to call office.  PT aware and agrees.

## 2019-11-25 NOTE — PHYSICAL EXAM
[No Acute Distress] : in no acute distress [Well Nourished] : ~L well nourished [Well developed] : well developed [Oriented x3] : oriented to person, place, and time [Good Hygeine] : demonstrates good hygeine [Soft, Nontender] : the abdomen was soft and nontender [Normal Mood/Affect] : mood and affect are normal [Normal Gait] : gait was normal [Warm and Dry] : was warm and dry to touch [Vulvar Atrophy] : vulvar atrophy [de-identified] : Mild erythema around vulva and labia rubbing from underwear.

## 2019-11-25 NOTE — PROCEDURE
[Straight Catheterization] : insertion of a straight catheter [Patient] : the patient [Urinary Frequency] : urinary frequency [___ Fr Straight Tip] : a [unfilled] in [place] straight tip catheter [Clear] : clear [No Complications] : no complications [Tolerated Well] : the patient tolerated the procedure well [de-identified] : PVR 60mL [FreeTextEntry9] : Urethra cleared, catheter passed.  No CVAT.

## 2019-12-16 ENCOUNTER — APPOINTMENT (OUTPATIENT)
Dept: PULMONOLOGY | Facility: CLINIC | Age: 79
End: 2019-12-16
Payer: MEDICARE

## 2019-12-16 VITALS
HEART RATE: 68 BPM | DIASTOLIC BLOOD PRESSURE: 84 MMHG | RESPIRATION RATE: 16 BRPM | SYSTOLIC BLOOD PRESSURE: 152 MMHG | TEMPERATURE: 98.4 F | OXYGEN SATURATION: 100 %

## 2019-12-16 PROCEDURE — 88738 HGB QUANT TRANSCUTANEOUS: CPT

## 2019-12-16 PROCEDURE — 94060 EVALUATION OF WHEEZING: CPT

## 2019-12-16 PROCEDURE — 94727 GAS DIL/WSHOT DETER LNG VOL: CPT

## 2019-12-16 PROCEDURE — 71046 X-RAY EXAM CHEST 2 VIEWS: CPT

## 2019-12-16 PROCEDURE — 94729 DIFFUSING CAPACITY: CPT

## 2019-12-16 PROCEDURE — 99214 OFFICE O/P EST MOD 30 MIN: CPT | Mod: 25

## 2019-12-16 RX ORDER — LOTEPREDNOL ETABONATE 5 MG/G
0.5 GEL OPHTHALMIC
Qty: 5 | Refills: 0 | Status: DISCONTINUED | COMMUNITY
Start: 2019-03-07 | End: 2019-12-16

## 2019-12-16 RX ORDER — OXYCODONE AND ACETAMINOPHEN 5; 325 MG/1; MG/1
5-325 TABLET ORAL
Qty: 5 | Refills: 0 | Status: DISCONTINUED | COMMUNITY
Start: 2019-11-07

## 2019-12-16 RX ORDER — CLOBETASOL PROPIONATE 0.5 MG/G
0.05 CREAM TOPICAL
Qty: 60 | Refills: 0 | Status: DISCONTINUED | COMMUNITY
Start: 2019-02-26 | End: 2019-12-16

## 2019-12-16 RX ORDER — NEPAFENAC 3 MG/ML
0.3 SUSPENSION/ DROPS OPHTHALMIC
Qty: 1 | Refills: 0 | Status: DISCONTINUED | COMMUNITY
Start: 2019-03-07 | End: 2019-12-16

## 2019-12-16 RX ORDER — BESIFLOXACIN 6 MG/ML
0.6 SUSPENSION OPHTHALMIC
Qty: 5 | Refills: 0 | Status: DISCONTINUED | COMMUNITY
Start: 2019-03-07 | End: 2019-12-16

## 2019-12-16 RX ORDER — SILVER SULFADIAZINE 10 G/1000G
1 CREAM TOPICAL
Qty: 85 | Refills: 0 | Status: DISCONTINUED | COMMUNITY
Start: 2019-11-07

## 2019-12-16 RX ORDER — DICLOFENAC SODIUM 75 MG
75 TABLET, DELAYED RELEASE (ENTERIC COATED) ORAL
Refills: 0 | Status: DISCONTINUED | COMMUNITY
End: 2019-12-16

## 2019-12-16 RX ORDER — LOTEPREDNOL ETABONATE 5 MG/ML
0.5 SUSPENSION/ DROPS OPHTHALMIC
Qty: 5 | Refills: 0 | Status: DISCONTINUED | COMMUNITY
Start: 2019-06-17

## 2019-12-16 RX ORDER — SULFAMETHOXAZOLE AND TRIMETHOPRIM 800; 160 MG/1; MG/1
800-160 TABLET ORAL
Qty: 6 | Refills: 0 | Status: DISCONTINUED | COMMUNITY
Start: 2019-03-11 | End: 2019-12-16

## 2019-12-16 RX ORDER — DICLOFENAC SODIUM 10 MG/G
1 GEL TOPICAL
Qty: 100 | Refills: 0 | Status: DISCONTINUED | COMMUNITY
Start: 2019-09-14

## 2019-12-16 RX ORDER — MUPIROCIN 20 MG/G
2 OINTMENT TOPICAL
Qty: 22 | Refills: 0 | Status: DISCONTINUED | COMMUNITY
Start: 2019-02-26 | End: 2019-12-16

## 2019-12-16 RX ORDER — ERYTHROMYCIN 5 MG/G
5 OINTMENT OPHTHALMIC
Qty: 3 | Refills: 0 | Status: DISCONTINUED | COMMUNITY
Start: 2019-02-21 | End: 2019-12-16

## 2019-12-16 RX ORDER — NITROFURANTOIN (MONOHYDRATE/MACROCRYSTALS) 25; 75 MG/1; MG/1
100 CAPSULE ORAL DAILY
Qty: 90 | Refills: 2 | Status: DISCONTINUED | COMMUNITY
Start: 2019-03-07 | End: 2019-12-16

## 2019-12-16 RX ORDER — NITROFURANTOIN (MONOHYDRATE/MACROCRYSTALS) 25; 75 MG/1; MG/1
100 CAPSULE ORAL TWICE DAILY
Qty: 10 | Refills: 0 | Status: DISCONTINUED | COMMUNITY
Start: 2019-03-07 | End: 2019-12-16

## 2019-12-16 RX ORDER — PHENAZOPYRIDINE 200 MG/1
200 TABLET, FILM COATED ORAL 3 TIMES DAILY
Qty: 90 | Refills: 0 | Status: DISCONTINUED | COMMUNITY
Start: 2018-11-08 | End: 2019-12-16

## 2019-12-16 RX ORDER — CEFADROXIL 500 MG/1
500 CAPSULE ORAL
Qty: 20 | Refills: 0 | Status: DISCONTINUED | COMMUNITY
Start: 2019-12-02

## 2019-12-16 RX ORDER — NYSTATIN 100000 [USP'U]/G
100000 CREAM TOPICAL TWICE DAILY
Qty: 1 | Refills: 0 | Status: DISCONTINUED | COMMUNITY
Start: 2019-01-10 | End: 2019-12-16

## 2019-12-16 RX ORDER — CARISOPRODOL 350 MG/1
350 TABLET ORAL
Qty: 15 | Refills: 0 | Status: DISCONTINUED | COMMUNITY
Start: 2019-12-11

## 2019-12-17 NOTE — HISTORY OF PRESENT ILLNESS
[Stable] : are stable [None] : ~He/She~ has no significant interval events [Difficulty Breathing During Exertion] : denies dyspnea on exertion [Feelings Of Weakness On Exertion] : denies exercise intolerance [Cough] : worsened coughing [Wheezing] : denies wheezing [Regional Soft Tissue Swelling Both Lower Extremities] : denies lower extremity edema [Chest Pain Or Discomfort] : denies chest pain [Fever] : denies fever [Wt Gain ___ Lbs] : no recent weight gain [Wt Loss ___ Lbs] : no recent weight loss [Oxygen] : the patient uses no supplemental oxygen

## 2019-12-17 NOTE — PROCEDURE
[FreeTextEntry1] : States Flu vaccination up-to-date\par History of pneumococcal vaccination and Prevnar administered\par EFT December 16, 2019\par Mild obstructive ventilatory impairment\par No response to bronchodilator at the FEV1\par Lung volumes are normal with total capacity 95% predicted.\par Diffusion relatively normal 78% of predicted.\par Hemoglobin 13.7\par \par Chest x-ray PA lateral December 16 2019\par Mild cardiomegaly\par Left lower lung zone minimal discoid linear atelectatic change\par No parenchymal infiltrates pleural effusions dominant pulmonary nodules\par Soft tissue bony structures grossly normal\par Impression no evidence of pneumonia\par \par

## 2019-12-17 NOTE — REVIEW OF SYSTEMS
[Hypertension] : ~T hypertension [As Noted in HPI] : as noted in HPI [Negative] : Psychiatric [FreeTextEntry9] : ADÁN

## 2019-12-17 NOTE — DISCUSSION/SUMMARY
[FreeTextEntry1] : Obstructive airway disease\par Asthmatic bronchitis\par Other history as noted above\par Vaccination and pneumococcal profile up to date\par 2 puffs q.i.d. p.r.n. rescue therapy\par Advised if needs to use should notify us for evaluation\par Z-Tino\par Medrol Dosepak\par Patient has clinical improvement we will just asked to schedule regular appointment with her  for 3 months

## 2019-12-17 NOTE — PHYSICAL EXAM
[General Appearance - Well Developed] : well developed [Well Groomed] : well groomed [Normal Appearance] : normal appearance [General Appearance - Well Nourished] : well nourished [General Appearance - In No Acute Distress] : no acute distress [No Deformities] : no deformities [Normal Oropharynx] : normal oropharynx [I] : I [Neck Appearance] : the appearance of the neck was normal [Neck Cervical Mass (___cm)] : no neck mass was observed [Jugular Venous Distention Increased] : there was no jugular-venous distention [Heart Rate And Rhythm] : heart rate and rhythm were normal [Thyroid Diffuse Enlargement] : the thyroid was not enlarged [Murmurs] : no murmurs present [Heart Sounds] : normal S1 and S2 [Arterial Pulses Normal] : the arterial pulses were normal [Edema] : no peripheral edema present [Veins - Varicosity Changes] : no varicosital changes were noted in the lower extremities [Exaggerated Use Of Accessory Muscles For Inspiration] : no accessory muscle use [Respiration, Rhythm And Depth] : normal respiratory rhythm and effort [Auscultation Breath Sounds / Voice Sounds] : lungs were clear to auscultation bilaterally [Chest Palpation] : palpation of the chest revealed no abnormalities [Abdomen Soft] : soft [Lungs Percussion] : the lungs were normal to percussion [Abdomen Tenderness] : non-tender [Abdomen Mass (___ Cm)] : no abdominal mass palpated [Abnormal Walk] : normal gait [Cyanosis, Localized] : no localized cyanosis [Nail Clubbing] : no clubbing of the fingernails [Petechial Hemorrhages (___cm)] : no petechial hemorrhages [Skin Color & Pigmentation] : normal skin color and pigmentation [] : no rash [Skin Lesions] : no skin lesions [No Skin Ulcers] : no skin ulcer [No Venous Stasis] : no venous stasis [No Xanthoma] : no  xanthoma was observed [Deep Tendon Reflexes (DTR)] : deep tendon reflexes were 2+ and symmetric [Sensation] : the sensory exam was normal to light touch and pinprick [No Focal Deficits] : no focal deficits [Affect] : the affect was normal [Oriented To Time, Place, And Person] : oriented to person, place, and time [Impaired Insight] : insight and judgment were intact [Mood] : the mood was normal [FreeTextEntry1] : ecchymosis left eyelid post Opth plastic surgery

## 2019-12-18 ENCOUNTER — RX RENEWAL (OUTPATIENT)
Age: 79
End: 2019-12-18

## 2019-12-20 ENCOUNTER — APPOINTMENT (OUTPATIENT)
Dept: PULMONOLOGY | Facility: CLINIC | Age: 79
End: 2019-12-20
Payer: MEDICARE

## 2019-12-20 VITALS
SYSTOLIC BLOOD PRESSURE: 170 MMHG | HEART RATE: 68 BPM | TEMPERATURE: 97.2 F | DIASTOLIC BLOOD PRESSURE: 98 MMHG | OXYGEN SATURATION: 96 % | RESPIRATION RATE: 15 BRPM

## 2019-12-20 DIAGNOSIS — Z87.09 PERSONAL HISTORY OF OTHER DISEASES OF THE RESPIRATORY SYSTEM: ICD-10-CM

## 2019-12-20 PROCEDURE — 99214 OFFICE O/P EST MOD 30 MIN: CPT | Mod: 25

## 2019-12-20 PROCEDURE — 94010 BREATHING CAPACITY TEST: CPT

## 2019-12-20 PROCEDURE — 71046 X-RAY EXAM CHEST 2 VIEWS: CPT

## 2019-12-20 RX ORDER — METHYLPREDNISOLONE 4 MG/1
4 TABLET ORAL
Qty: 1 | Refills: 0 | Status: DISCONTINUED | COMMUNITY
Start: 2019-12-16 | End: 2019-12-20

## 2019-12-20 RX ORDER — AZITHROMYCIN 250 MG/1
250 TABLET, FILM COATED ORAL
Qty: 1 | Refills: 0 | Status: DISCONTINUED | COMMUNITY
Start: 2019-12-16 | End: 2019-12-20

## 2019-12-21 LAB
ANION GAP SERPL CALC-SCNC: 17 MMOL/L
BASOPHILS # BLD AUTO: 0.03 K/UL
BASOPHILS NFR BLD AUTO: 0.4 %
BUN SERPL-MCNC: 13 MG/DL
CALCIUM SERPL-MCNC: 9.7 MG/DL
CHLORIDE SERPL-SCNC: 93 MMOL/L
CO2 SERPL-SCNC: 24 MMOL/L
CREAT SERPL-MCNC: 0.53 MG/DL
EOSINOPHIL # BLD AUTO: 0.03 K/UL
EOSINOPHIL NFR BLD AUTO: 0.4 %
FLUAV H1 2009 PAND RNA SPEC QL NAA+PROBE: DETECTED
GLUCOSE SERPL-MCNC: 92 MG/DL
HCT VFR BLD CALC: 43.4 %
HGB BLD-MCNC: 14.3 G/DL
IMM GRANULOCYTES NFR BLD AUTO: 0.4 %
LYMPHOCYTES # BLD AUTO: 1.42 K/UL
LYMPHOCYTES NFR BLD AUTO: 19.3 %
MAN DIFF?: NORMAL
MCHC RBC-ENTMCNC: 30.8 PG
MCHC RBC-ENTMCNC: 32.9 GM/DL
MCV RBC AUTO: 93.5 FL
MONOCYTES # BLD AUTO: 0.89 K/UL
MONOCYTES NFR BLD AUTO: 12.1 %
NEUTROPHILS # BLD AUTO: 4.97 K/UL
NEUTROPHILS NFR BLD AUTO: 67.4 %
PLATELET # BLD AUTO: 219 K/UL
POTASSIUM SERPL-SCNC: 4.1 MMOL/L
PROCALCITONIN SERPL-MCNC: 0.05 NG/ML
RAPID RVP RESULT: DETECTED
RBC # BLD: 4.64 M/UL
RBC # FLD: 12.3 %
SODIUM SERPL-SCNC: 134 MMOL/L
WBC # FLD AUTO: 7.37 K/UL

## 2019-12-21 NOTE — DISCUSSION/SUMMARY
[FreeTextEntry1] : Acute Viral Bronchitis-influenza A positive\par - change prednisone 40 mg  with QD 3  day taper\par Doxy \par Mucinex DM 1 po BID\par DULERA 200-5  mcg 2 puffs BID and Rinse\par Obstructive airway disease\par Physiology with a moderate obstruction and a 60% response to bronchodilator at the FEV1\par Other history as noted above\par Vaccination and pneumococcal profile up to date\par Advised if needs to use should notify us for evaluation

## 2019-12-21 NOTE — HISTORY OF PRESENT ILLNESS
[Stable] : are stable [None] : ~He/She~ has no significant interval events [Difficulty Breathing During Exertion] : denies dyspnea on exertion [Feelings Of Weakness On Exertion] : denies exercise intolerance [Cough] : worsened coughing [Wheezing] : denies wheezing [Regional Soft Tissue Swelling Both Lower Extremities] : denies lower extremity edema [Chest Pain Or Discomfort] : denies chest pain [Fever] : denies fever [Wt Loss ___ Lbs] : no recent weight loss [Wt Gain ___ Lbs] : no recent weight gain [Oxygen] : the patient uses no supplemental oxygen

## 2019-12-21 NOTE — PHYSICAL EXAM
[Normal Appearance] : normal appearance [General Appearance - Well Developed] : well developed [General Appearance - Well Nourished] : well nourished [No Deformities] : no deformities [Well Groomed] : well groomed [General Appearance - In No Acute Distress] : no acute distress [Normal Oropharynx] : normal oropharynx [Neck Appearance] : the appearance of the neck was normal [I] : I [Neck Cervical Mass (___cm)] : no neck mass was observed [Thyroid Diffuse Enlargement] : the thyroid was not enlarged [Jugular Venous Distention Increased] : there was no jugular-venous distention [Heart Rate And Rhythm] : heart rate and rhythm were normal [Heart Sounds] : normal S1 and S2 [Murmurs] : no murmurs present [Edema] : no peripheral edema present [Veins - Varicosity Changes] : no varicosital changes were noted in the lower extremities [Arterial Pulses Normal] : the arterial pulses were normal [Exaggerated Use Of Accessory Muscles For Inspiration] : no accessory muscle use [Respiration, Rhythm And Depth] : normal respiratory rhythm and effort [Lungs Percussion] : the lungs were normal to percussion [Auscultation Breath Sounds / Voice Sounds] : lungs were clear to auscultation bilaterally [Chest Palpation] : palpation of the chest revealed no abnormalities [Abdomen Tenderness] : non-tender [Abdomen Soft] : soft [Abdomen Mass (___ Cm)] : no abdominal mass palpated [Abnormal Walk] : normal gait [Nail Clubbing] : no clubbing of the fingernails [Cyanosis, Localized] : no localized cyanosis [Petechial Hemorrhages (___cm)] : no petechial hemorrhages [Skin Color & Pigmentation] : normal skin color and pigmentation [] : no rash [No Venous Stasis] : no venous stasis [Skin Lesions] : no skin lesions [No Xanthoma] : no  xanthoma was observed [No Skin Ulcers] : no skin ulcer [Deep Tendon Reflexes (DTR)] : deep tendon reflexes were 2+ and symmetric [Sensation] : the sensory exam was normal to light touch and pinprick [No Focal Deficits] : no focal deficits [Oriented To Time, Place, And Person] : oriented to person, place, and time [Affect] : the affect was normal [Impaired Insight] : insight and judgment were intact [Mood] : the mood was normal [FreeTextEntry1] : ecchymosis left eyelid post Opth plastic surgery

## 2019-12-21 NOTE — PROCEDURE
[FreeTextEntry1] : Spirometry without BD 12/20/21019\par reduction flow  rates\par \par Chest x-ray PA lateral December 20, 2019\par Normal cardiac size\par Mild right lateral scoliosis\par Suggestion of some bronchiectatic changes at right lower lung zone\par This is unchanged compared to the recent chest x-ray of December 16, 2019\par No consolidation consistent with active pneumonia\par \par States Flu vaccination up-to-date\par History of pneumococcal vaccination and Prevnar administered\par PFTDecember 16, 2019\par Mild obstructive ventilatory impairment\par No response to bronchodilator at the FEV1\par Lung volumes are normal with total capacity 95% predicted.\par Diffusion relatively normal 78% of predicted.\par Hemoglobin 13.7\par \par Chest x-ray PA lateral December 16 2019\par Mild cardiomegaly\par Left lower lung zone minimal discoid linear atelectatic change\par No parenchymal infiltrates pleural effusions dominant pulmonary nodules\par Soft tissue bony structures grossly normal\par Impression no evidence of pneumonia\par \par Data review 12/21/2019\par Serum sodium 134 with serum chloride 93 normal renal function\par CBC White blood count 7.37 hemoglobin 14.3 hematocrit 43.4\par Procalcitonin negative\par RVP positive influenza A

## 2019-12-25 ENCOUNTER — FORM ENCOUNTER (OUTPATIENT)
Age: 79
End: 2019-12-25

## 2019-12-26 ENCOUNTER — OUTPATIENT (OUTPATIENT)
Dept: OUTPATIENT SERVICES | Facility: HOSPITAL | Age: 79
LOS: 1 days | End: 2019-12-26
Payer: MEDICARE

## 2019-12-26 ENCOUNTER — APPOINTMENT (OUTPATIENT)
Dept: CT IMAGING | Facility: CLINIC | Age: 79
End: 2019-12-26
Payer: MEDICARE

## 2019-12-26 DIAGNOSIS — J40 BRONCHITIS, NOT SPECIFIED AS ACUTE OR CHRONIC: ICD-10-CM

## 2019-12-26 PROCEDURE — 71250 CT THORAX DX C-: CPT

## 2019-12-26 PROCEDURE — 71250 CT THORAX DX C-: CPT | Mod: 26

## 2020-01-10 ENCOUNTER — APPOINTMENT (OUTPATIENT)
Dept: PULMONOLOGY | Facility: CLINIC | Age: 80
End: 2020-01-10
Payer: MEDICARE

## 2020-01-10 VITALS
TEMPERATURE: 97.9 F | HEIGHT: 64 IN | SYSTOLIC BLOOD PRESSURE: 132 MMHG | DIASTOLIC BLOOD PRESSURE: 68 MMHG | HEART RATE: 67 BPM | WEIGHT: 130 LBS | OXYGEN SATURATION: 98 % | BODY MASS INDEX: 22.2 KG/M2

## 2020-01-10 PROCEDURE — 99213 OFFICE O/P EST LOW 20 MIN: CPT | Mod: 25

## 2020-01-10 PROCEDURE — 94060 EVALUATION OF WHEEZING: CPT

## 2020-01-10 RX ORDER — DOXYCYCLINE HYCLATE 100 MG/1
100 CAPSULE ORAL
Qty: 14 | Refills: 0 | Status: DISCONTINUED | COMMUNITY
Start: 2019-12-20 | End: 2020-01-10

## 2020-01-10 RX ORDER — PREDNISONE 10 MG/1
10 TABLET ORAL
Qty: 30 | Refills: 1 | Status: DISCONTINUED | COMMUNITY
Start: 2019-12-20 | End: 2020-01-10

## 2020-01-10 RX ORDER — BENZONATATE 100 MG/1
100 CAPSULE ORAL
Qty: 20 | Refills: 0 | Status: DISCONTINUED | COMMUNITY
Start: 2019-12-14 | End: 2020-01-10

## 2020-01-10 RX ORDER — OSELTAMIVIR PHOSPHATE 75 MG/1
75 CAPSULE ORAL TWICE DAILY
Qty: 1 | Refills: 0 | Status: DISCONTINUED | COMMUNITY
Start: 2019-12-21 | End: 2020-01-10

## 2020-01-10 NOTE — REVIEW OF SYSTEMS
[Hypertension] : ~T hypertension [As Noted in HPI] : as noted in HPI [Negative] : Neurologic [FreeTextEntry9] : ADÁN

## 2020-01-10 NOTE — DISCUSSION/SUMMARY
[FreeTextEntry1] : Acute Viral Bronchitis- Inf A \par "Impaction distal bronchi\par - change prednisone 40 mg  with QD 3  day taper- completed\par  Tamiflu  completed\par DULERA 200-5  mcg 2 puffs BID and Rinse changed to [place] and off at  present\par Obstructive airway disease\par Physiology with a moderate obstruction and a 60% response to bronchodilator at the FEV1\par Other history as noted above\par Vaccination and pneumococcal profile up to date\par Advised if needs to use should notify us for evaluation

## 2020-01-10 NOTE — HISTORY OF PRESENT ILLNESS
[Stable] : are stable [Feelings Of Weakness On Exertion] : denies exercise intolerance [Difficulty Breathing During Exertion] : denies dyspnea on exertion [None] : ~He/She~ has no significant interval events [Wheezing] : denies wheezing [Chest Pain Or Discomfort] : denies chest pain [Regional Soft Tissue Swelling Both Lower Extremities] : denies lower extremity edema [Cough] : worsened coughing [Fever] : denies fever [Wt Gain ___ Lbs] : no recent weight gain [Wt Loss ___ Lbs] : no recent weight loss [Oxygen] : the patient uses no supplemental oxygen

## 2020-01-10 NOTE — PHYSICAL EXAM
[Normal Appearance] : normal appearance [General Appearance - Well Developed] : well developed [No Deformities] : no deformities [General Appearance - Well Nourished] : well nourished [Well Groomed] : well groomed [Normal Oropharynx] : normal oropharynx [General Appearance - In No Acute Distress] : no acute distress [Neck Appearance] : the appearance of the neck was normal [I] : I [Jugular Venous Distention Increased] : there was no jugular-venous distention [Thyroid Diffuse Enlargement] : the thyroid was not enlarged [Neck Cervical Mass (___cm)] : no neck mass was observed [Heart Rate And Rhythm] : heart rate and rhythm were normal [Murmurs] : no murmurs present [Arterial Pulses Normal] : the arterial pulses were normal [Heart Sounds] : normal S1 and S2 [Veins - Varicosity Changes] : no varicosital changes were noted in the lower extremities [Edema] : no peripheral edema present [Respiration, Rhythm And Depth] : normal respiratory rhythm and effort [Exaggerated Use Of Accessory Muscles For Inspiration] : no accessory muscle use [Auscultation Breath Sounds / Voice Sounds] : lungs were clear to auscultation bilaterally [Lungs Percussion] : the lungs were normal to percussion [Chest Palpation] : palpation of the chest revealed no abnormalities [Abdomen Tenderness] : non-tender [Abdomen Soft] : soft [Abdomen Mass (___ Cm)] : no abdominal mass palpated [Abnormal Walk] : normal gait [Nail Clubbing] : no clubbing of the fingernails [Petechial Hemorrhages (___cm)] : no petechial hemorrhages [Cyanosis, Localized] : no localized cyanosis [] : no rash [Skin Color & Pigmentation] : normal skin color and pigmentation [No Venous Stasis] : no venous stasis [No Skin Ulcers] : no skin ulcer [Skin Lesions] : no skin lesions [No Xanthoma] : no  xanthoma was observed [Deep Tendon Reflexes (DTR)] : deep tendon reflexes were 2+ and symmetric [No Focal Deficits] : no focal deficits [Sensation] : the sensory exam was normal to light touch and pinprick [Oriented To Time, Place, And Person] : oriented to person, place, and time [Impaired Insight] : insight and judgment were intact [Mood] : the mood was normal [Affect] : the affect was normal [FreeTextEntry1] : ecchymosis left eyelid post Opth plastic surgery

## 2020-01-10 NOTE — PROCEDURE
[FreeTextEntry1] : Spirometry January 10, 2020\par Mild to moderate obstructive ventilatory impairment\par No response to bronchodilator at the FEV1\par Interval improvement at the flow rates\par \par \par Chest x-ray PA lateral December 20, 2019\par Normal cardiac size\par Mild right lateral scoliosis\par Suggestion of some bronchiectatic changes at right lower lung zone\par This is unchanged compared to the recent chest x-ray of December 16, 2019\par No consolidation consistent with active pneumonia\par \par States Flu vaccination up-to-date\par History of pneumococcal vaccination and Prevnar administered\par \par PFTDecember 16, 2019\par Mild obstructive ventilatory impairment\par No response to bronchodilator at the FEV1\par Lung volumes are normal with total capacity 95% predicted.\par Diffusion relatively normal 78% of predicted.\par Hemoglobin 13.7\par \par Chest x-ray PA lateral December 16 2019\par Mild cardiomegaly\par Left lower lung zone minimal discoid linear atelectatic change\par No parenchymal infiltrates pleural effusions dominant pulmonary nodules\par Soft tissue bony structures grossly normal\par Impression no evidence of pneumonia\par \par Data review 12/21/2019\par Serum sodium 134 with serum chloride 93 normal renal function\par CBC White blood count 7.37 hemoglobin 14.3 hematocrit 43.4\par Procalcitonin negative\par RVP positive influenza A\par \par CT chest December 26, 2019\par Right mid lower lung zone tree-in-bud with centrilobular groundglass nodular opacities most consistent with impacted distal airways

## 2020-01-13 ENCOUNTER — APPOINTMENT (OUTPATIENT)
Dept: UROGYNECOLOGY | Facility: CLINIC | Age: 80
End: 2020-01-13
Payer: MEDICARE

## 2020-01-14 ENCOUNTER — APPOINTMENT (OUTPATIENT)
Dept: UROGYNECOLOGY | Facility: CLINIC | Age: 80
End: 2020-01-14
Payer: MEDICARE

## 2020-01-14 DIAGNOSIS — R09.82 POSTNASAL DRIP: ICD-10-CM

## 2020-01-14 LAB
BILIRUB UR QL STRIP: NORMAL
CLARITY UR: CLEAR
COLLECTION METHOD: NORMAL
GLUCOSE UR-MCNC: NORMAL
HCG UR QL: 1 EU/DL
HGB UR QL STRIP.AUTO: NORMAL
KETONES UR-MCNC: NORMAL
LEUKOCYTE ESTERASE UR QL STRIP: NORMAL
NITRITE UR QL STRIP: NORMAL
PH UR STRIP: 6
PROT UR STRIP-MCNC: NORMAL
SP GR UR STRIP: 1.02

## 2020-01-14 PROCEDURE — 51701 INSERT BLADDER CATHETER: CPT

## 2020-01-14 PROCEDURE — 99213 OFFICE O/P EST LOW 20 MIN: CPT | Mod: 25

## 2020-01-15 NOTE — HISTORY OF PRESENT ILLNESS
[FreeTextEntry1] : Pt presents to office for s/o frequency of every 30 minutes x 2 weeks.  Denies dysuria, low back pain, fever, chills, low back pain.  Pt has h/o OAB which is being managed well with behavioral modifications and uristat PRN.  Nocturia once/night.\par \par Pt has a h/o recurrent UTI's with positive cultures.  However with catheterized samples urine cultures are usually negative.  She was on daily nitrofurantoin prophylaxis for 3 months after initial visit. She stopped the prophylaxis and denies any UTI's.  She is using vaginal estrogen as directed. 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 Therefore, she has yearly cystoscopy with [facility].  She is due for cystoscopy 3/2020.

## 2020-01-15 NOTE — PHYSICAL EXAM
[No Acute Distress] : in no acute distress [Well developed] : well developed [Well Nourished] : ~L well nourished [Good Hygeine] : demonstrates good hygeine [Oriented x3] : oriented to person, place, and time [Normal Memory] : ~T memory was ~L unimpaired [Normal Mood/Affect] : mood and affect are normal [Warm and Dry] : was warm and dry to touch [Normal Gait] : gait was normal [Labia Majora] : were normal [Labia Minora] : were normal [Normal] : was normal [Normal Appearance] : general appearance was normal [Atrophy] : atrophy [Anxiety] : patient is not anxious [Tenderness] : ~T no ~M abdominal tenderness observed [Distended] : not distended

## 2020-01-15 NOTE — PROCEDURE
[Straight Catheterization] : insertion of a straight catheter [Urinary Frequency] : urinary frequency [___ Fr Straight Tip] : a [unfilled] in [place] straight tip catheter [Clear] : clear [No Complications] : no complications [Tolerated Well] : the patient tolerated the procedure well [de-identified] : urethra clear, catheter easily passed

## 2020-01-15 NOTE — DISCUSSION/SUMMARY
[FreeTextEntry1] : Pt assured that in office UA was negative and she does not have a UTI.  She has taken OAB medications in the past.  She experienced urinary retention on anticholinergics and Myrbetriq was ineffective.  Pt was counseled on 3rd line therapies, in case frequency became bothersome. She reports that taking Uristat PRN is helpful. She would like to continue behavioral modifications. She will continue vaginal estrogen and RTO in one year or sooner if needed.

## 2020-03-09 ENCOUNTER — APPOINTMENT (OUTPATIENT)
Dept: PULMONOLOGY | Facility: CLINIC | Age: 80
End: 2020-03-09
Payer: MEDICARE

## 2020-03-09 VITALS
OXYGEN SATURATION: 94 % | SYSTOLIC BLOOD PRESSURE: 151 MMHG | TEMPERATURE: 98.1 F | DIASTOLIC BLOOD PRESSURE: 72 MMHG | HEART RATE: 68 BPM

## 2020-03-09 PROCEDURE — 94060 EVALUATION OF WHEEZING: CPT

## 2020-03-09 PROCEDURE — 99213 OFFICE O/P EST LOW 20 MIN: CPT | Mod: 25

## 2020-03-09 NOTE — HISTORY OF PRESENT ILLNESS
[Never] : never [Stable] : are stable [None] : ~He/She~ has no significant interval events [Difficulty Breathing During Exertion] : denies dyspnea on exertion [Feelings Of Weakness On Exertion] : denies exercise intolerance [Cough] : worsened coughing [Wheezing] : denies wheezing [Regional Soft Tissue Swelling Both Lower Extremities] : denies lower extremity edema [Chest Pain Or Discomfort] : denies chest pain [Fever] : denies fever [TextBox_4] : Overall feeling well. Denies cough/wheeze/SOB.  [Wt Gain ___ Lbs] : no recent weight gain [Wt Loss ___ Lbs] : no recent weight loss [Oxygen] : the patient uses no supplemental oxygen

## 2020-03-09 NOTE — DISCUSSION/SUMMARY
[FreeTextEntry1] : Acute Viral Bronchitis- Inf A - Resolved\par "Impaction distal bronchi\par - change prednisone 40 mg  with QD 3  day taper- completed\par  Tamiflu  completed\par DULERA 200-5  mcg 2 puffs BID and Rinse changed to Treley and off at  present\par Obstructive airway disease\par Physiology with a moderate obstruction and a 60% response to bronchodilator at the FEV1\par Other history as noted above\par Vaccination and pneumococcal profile up to date\par Advised if needs to use should notify us for evaluation

## 2020-03-09 NOTE — PROCEDURE
[FreeTextEntry1] : Spirometry March 9, 2020\par Mild obstructive ventilatory impairment\par No bronchodilator response at FEV1\par Stable FEV1\par \par Chest x-ray PA lateral December 20, 2019\par Normal cardiac size\par Mild right lateral scoliosis\par Suggestion of some bronchiectatic changes at right lower lung zone\par This is unchanged compared to the recent chest x-ray of December 16, 2019\par No consolidation consistent with active pneumonia\par \par States Flu vaccination up-to-date\par History of pneumococcal vaccination and Prevnar administered\par \par PFT December 16, 2019\par Mild obstructive ventilatory impairment\par No response to bronchodilator at the FEV1\par Lung volumes are normal with total capacity 95% predicted.\par Diffusion relatively normal 78% of predicted.\par Hemoglobin 13.7\par \par Chest x-ray PA lateral December 16 2019\par Mild cardiomegaly\par Left lower lung zone minimal discoid linear atelectatic change\par No parenchymal infiltrates pleural effusions dominant pulmonary nodules\par Soft tissue bony structures grossly normal\par Impression no evidence of pneumonia\par \par Data review 12/21/2019\par Serum sodium 134 with serum chloride 93 normal renal function\par CBC White blood count 7.37 hemoglobin 14.3 hematocrit 43.4\par Procalcitonin negative\par RVP positive influenza A\par \par CT chest December 26, 2019\par Right mid lower lung zone tree-in-bud with centrilobular groundglass nodular opacities most consistent with impacted distal airways

## 2020-05-19 ENCOUNTER — RX RENEWAL (OUTPATIENT)
Age: 80
End: 2020-05-19

## 2020-06-02 ENCOUNTER — RX RENEWAL (OUTPATIENT)
Age: 80
End: 2020-06-02

## 2020-06-09 ENCOUNTER — RX RENEWAL (OUTPATIENT)
Age: 80
End: 2020-06-09

## 2020-06-28 ENCOUNTER — RX RENEWAL (OUTPATIENT)
Age: 80
End: 2020-06-28

## 2020-07-05 DIAGNOSIS — Z01.818 ENCOUNTER FOR OTHER PREPROCEDURAL EXAMINATION: ICD-10-CM

## 2020-07-07 ENCOUNTER — APPOINTMENT (OUTPATIENT)
Dept: DISASTER EMERGENCY | Facility: CLINIC | Age: 80
End: 2020-07-07

## 2020-07-08 LAB — SARS-COV-2 N GENE NPH QL NAA+PROBE: NOT DETECTED

## 2020-07-10 ENCOUNTER — APPOINTMENT (OUTPATIENT)
Dept: PULMONOLOGY | Facility: CLINIC | Age: 80
End: 2020-07-10
Payer: MEDICARE

## 2020-07-10 VITALS
SYSTOLIC BLOOD PRESSURE: 178 MMHG | HEIGHT: 64 IN | HEART RATE: 63 BPM | BODY MASS INDEX: 22.2 KG/M2 | WEIGHT: 130 LBS | OXYGEN SATURATION: 98 % | TEMPERATURE: 98.1 F | DIASTOLIC BLOOD PRESSURE: 76 MMHG | RESPIRATION RATE: 14 BRPM

## 2020-07-10 PROCEDURE — 94729 DIFFUSING CAPACITY: CPT

## 2020-07-10 PROCEDURE — 94726 PLETHYSMOGRAPHY LUNG VOLUMES: CPT

## 2020-07-10 PROCEDURE — 94664 DEMO&/EVAL PT USE INHALER: CPT | Mod: 59

## 2020-07-10 PROCEDURE — 94750: CPT

## 2020-07-10 PROCEDURE — 94060 EVALUATION OF WHEEZING: CPT

## 2020-07-10 PROCEDURE — 88738 HGB QUANT TRANSCUTANEOUS: CPT

## 2020-07-11 LAB — POCT - HEMOGLOBIN (HGB), QUANTITATIVE, TRANSCUTANEOUS: 13

## 2020-07-15 ENCOUNTER — APPOINTMENT (OUTPATIENT)
Dept: PULMONOLOGY | Facility: CLINIC | Age: 80
End: 2020-07-15
Payer: MEDICARE

## 2020-07-15 VITALS
TEMPERATURE: 98 F | SYSTOLIC BLOOD PRESSURE: 166 MMHG | HEART RATE: 70 BPM | DIASTOLIC BLOOD PRESSURE: 76 MMHG | RESPIRATION RATE: 16 BRPM | OXYGEN SATURATION: 96 %

## 2020-07-15 DIAGNOSIS — Z11.59 ENCOUNTER FOR SCREENING FOR OTHER VIRAL DISEASES: ICD-10-CM

## 2020-07-15 PROCEDURE — 71046 X-RAY EXAM CHEST 2 VIEWS: CPT

## 2020-07-15 PROCEDURE — 99214 OFFICE O/P EST MOD 30 MIN: CPT | Mod: 25

## 2020-07-15 PROCEDURE — 36415 COLL VENOUS BLD VENIPUNCTURE: CPT

## 2020-07-17 LAB
SARS-COV-2 IGG SERPL IA-ACNC: <0.1 INDEX
SARS-COV-2 IGG SERPL QL IA: NEGATIVE

## 2020-07-17 NOTE — PHYSICAL EXAM
[Normal Appearance] : normal appearance [General Appearance - Well Developed] : well developed [General Appearance - Well Nourished] : well nourished [Well Groomed] : well groomed [No Deformities] : no deformities [General Appearance - In No Acute Distress] : no acute distress [Normal Oropharynx] : normal oropharynx [I] : I [Neck Appearance] : the appearance of the neck was normal [Neck Cervical Mass (___cm)] : no neck mass was observed [Jugular Venous Distention Increased] : there was no jugular-venous distention [Thyroid Diffuse Enlargement] : the thyroid was not enlarged [Heart Rate And Rhythm] : heart rate and rhythm were normal [Heart Sounds] : normal S1 and S2 [Murmurs] : no murmurs present [Arterial Pulses Normal] : the arterial pulses were normal [Edema] : no peripheral edema present [Veins - Varicosity Changes] : no varicosital changes were noted in the lower extremities [Respiration, Rhythm And Depth] : normal respiratory rhythm and effort [Exaggerated Use Of Accessory Muscles For Inspiration] : no accessory muscle use [Auscultation Breath Sounds / Voice Sounds] : lungs were clear to auscultation bilaterally [Chest Palpation] : palpation of the chest revealed no abnormalities [Lungs Percussion] : the lungs were normal to percussion [Abdomen Soft] : soft [Abdomen Tenderness] : non-tender [Abnormal Walk] : normal gait [Abdomen Mass (___ Cm)] : no abdominal mass palpated [Cyanosis, Localized] : no localized cyanosis [Nail Clubbing] : no clubbing of the fingernails [Petechial Hemorrhages (___cm)] : no petechial hemorrhages [Skin Color & Pigmentation] : normal skin color and pigmentation [] : no rash [No Venous Stasis] : no venous stasis [Skin Lesions] : no skin lesions [No Skin Ulcers] : no skin ulcer [No Xanthoma] : no  xanthoma was observed [Sensation] : the sensory exam was normal to light touch and pinprick [Deep Tendon Reflexes (DTR)] : deep tendon reflexes were 2+ and symmetric [No Focal Deficits] : no focal deficits [Oriented To Time, Place, And Person] : oriented to person, place, and time [Impaired Insight] : insight and judgment were intact [Affect] : the affect was normal [Mood] : the mood was normal [FreeTextEntry1] : varicose veins

## 2020-07-17 NOTE — HISTORY OF PRESENT ILLNESS
[Never] : never [Stable] : are stable [Difficulty Breathing During Exertion] : denies dyspnea on exertion [None] : ~He/She~ has no significant interval events [Feelings Of Weakness On Exertion] : denies exercise intolerance [Wheezing] : denies wheezing [Cough] : worsened coughing [Chest Pain Or Discomfort] : denies chest pain [Fever] : denies fever [Regional Soft Tissue Swelling Both Lower Extremities] : denies lower extremity edema [TextBox_4] : Overall feeling well. Denies cough/wheeze/SOB.  [Wt Gain ___ Lbs] : no recent weight gain [Oxygen] : the patient uses no supplemental oxygen [Wt Loss ___ Lbs] : no recent weight loss

## 2020-07-17 NOTE — PROCEDURE
[FreeTextEntry1] : Chest x-ray PA lateral July 15, 2020\par Borderline cardiomegaly\par Hyperaeration\par Clear lungs\par No parenchymal infiltrate pleural effusions or dominant pulmonary nodules\par No interval change compared to chest x-ray of December 20, 2019\par \par PFT Body BOX July 10 2020\par mild OAD\par  No BD at FEV1\par Normal lung volumes\par  sp conductance and resistance normal\par Diffusion 80 % pred normal\par  HGB 13.0 \par \par Spirometry March 9, 2020\par Mild obstructive ventilatory impairment\par No bronchodilator response at FEV1\par Stable FEV1\par \par Chest x-ray PA lateral December 20, 2019\par Normal cardiac size\par Mild right lateral scoliosis\par Suggestion of some bronchiectatic changes at right lower lung zone\par This is unchanged compared to the recent chest x-ray of December 16, 2019\par No consolidation consistent with active pneumonia\par \par States Flu vaccination up-to-date\par History of pneumococcal vaccination and Prevnar administered\par \par PFT December 16, 2019\par Mild obstructive ventilatory impairment\par No response to bronchodilator at the FEV1\par Lung volumes are normal with total capacity 95% predicted.\par Diffusion relatively normal 78% of predicted.\par Hemoglobin 13.7\par \par Chest x-ray PA lateral December 16 2019\par Mild cardiomegaly\par Left lower lung zone minimal discoid linear atelectatic change\par No parenchymal infiltrates pleural effusions dominant pulmonary nodules\par Soft tissue bony structures grossly normal\par Impression no evidence of pneumonia\par \par Data review 12/21/2019\par Serum sodium 134 with serum chloride 93 normal renal function\par CBC White blood count 7.37 hemoglobin 14.3 hematocrit 43.4\par Procalcitonin negative\par RVP positive influenza A\par \par CT chest December 26, 2019\par Right mid lower lung zone tree-in-bud with centrilobular groundglass nodular opacities most consistent with impacted distal airways\par \par Blood draw

## 2020-07-17 NOTE — DISCUSSION/SUMMARY
[FreeTextEntry1] : Acute Viral Bronchitis- Inf A - Resolved\par COVID AB Negative\par "Impaction distal bronchi\par - change prednisone 40 mg  with QD 3  day taper- completed\par  Tamiflu  completed\par DULERA 200-5  mcg 2 puffs BID and Rinse changed to Treley and off at  present\par Obstructive airway disease\par Physiology with a moderate obstruction and a 60% response to bronchodilator at the FEV1\par Other history as noted above\par Vaccination and pneumococcal profile up to date\par Advised if needs to use should notify us for evaluation

## 2020-08-05 ENCOUNTER — OUTPATIENT (OUTPATIENT)
Dept: OUTPATIENT SERVICES | Facility: HOSPITAL | Age: 80
LOS: 1 days | End: 2020-08-05

## 2020-08-05 DIAGNOSIS — Z00.8 ENCOUNTER FOR OTHER GENERAL EXAMINATION: ICD-10-CM

## 2020-08-11 ENCOUNTER — RX RENEWAL (OUTPATIENT)
Age: 80
End: 2020-08-11

## 2020-08-12 ENCOUNTER — OUTPATIENT (OUTPATIENT)
Dept: OUTPATIENT SERVICES | Facility: HOSPITAL | Age: 80
LOS: 1 days | End: 2020-08-12
Payer: MEDICARE

## 2020-08-12 ENCOUNTER — APPOINTMENT (OUTPATIENT)
Dept: MRI IMAGING | Facility: CLINIC | Age: 80
End: 2020-08-12
Payer: MEDICARE

## 2020-08-12 DIAGNOSIS — Z00.8 ENCOUNTER FOR OTHER GENERAL EXAMINATION: ICD-10-CM

## 2020-08-12 PROCEDURE — 73718 MRI LOWER EXTREMITY W/O DYE: CPT | Mod: 26,RT

## 2020-08-12 PROCEDURE — 73718 MRI LOWER EXTREMITY W/O DYE: CPT | Mod: 26,LT,76

## 2020-08-12 PROCEDURE — 73718 MRI LOWER EXTREMITY W/O DYE: CPT

## 2020-09-26 ENCOUNTER — RX RENEWAL (OUTPATIENT)
Age: 80
End: 2020-09-26

## 2020-10-14 ENCOUNTER — APPOINTMENT (OUTPATIENT)
Dept: PULMONOLOGY | Facility: CLINIC | Age: 80
End: 2020-10-14
Payer: MEDICARE

## 2020-10-14 VITALS
HEART RATE: 67 BPM | TEMPERATURE: 98.2 F | SYSTOLIC BLOOD PRESSURE: 143 MMHG | OXYGEN SATURATION: 97 % | BODY MASS INDEX: 22.88 KG/M2 | HEIGHT: 64 IN | WEIGHT: 134 LBS | DIASTOLIC BLOOD PRESSURE: 85 MMHG

## 2020-10-14 PROCEDURE — 99214 OFFICE O/P EST MOD 30 MIN: CPT

## 2020-10-14 NOTE — PHYSICAL EXAM
[General Appearance - Well Developed] : well developed [Normal Appearance] : normal appearance [General Appearance - Well Nourished] : well nourished [Well Groomed] : well groomed [No Deformities] : no deformities [General Appearance - In No Acute Distress] : no acute distress [Normal Oropharynx] : normal oropharynx [I] : I [Neck Appearance] : the appearance of the neck was normal [Neck Cervical Mass (___cm)] : no neck mass was observed [Jugular Venous Distention Increased] : there was no jugular-venous distention [Thyroid Diffuse Enlargement] : the thyroid was not enlarged [Heart Rate And Rhythm] : heart rate and rhythm were normal [Heart Sounds] : normal S1 and S2 [Murmurs] : no murmurs present [Arterial Pulses Normal] : the arterial pulses were normal [Edema] : no peripheral edema present [Veins - Varicosity Changes] : no varicosital changes were noted in the lower extremities [Respiration, Rhythm And Depth] : normal respiratory rhythm and effort [Auscultation Breath Sounds / Voice Sounds] : lungs were clear to auscultation bilaterally [Exaggerated Use Of Accessory Muscles For Inspiration] : no accessory muscle use [Chest Palpation] : palpation of the chest revealed no abnormalities [Lungs Percussion] : the lungs were normal to percussion [Abdomen Soft] : soft [Abdomen Tenderness] : non-tender [Abdomen Mass (___ Cm)] : no abdominal mass palpated [Abnormal Walk] : normal gait [Nail Clubbing] : no clubbing of the fingernails [Cyanosis, Localized] : no localized cyanosis [Petechial Hemorrhages (___cm)] : no petechial hemorrhages [Skin Color & Pigmentation] : normal skin color and pigmentation [] : no rash [No Venous Stasis] : no venous stasis [Skin Lesions] : no skin lesions [No Skin Ulcers] : no skin ulcer [No Xanthoma] : no  xanthoma was observed [Sensation] : the sensory exam was normal to light touch and pinprick [Deep Tendon Reflexes (DTR)] : deep tendon reflexes were 2+ and symmetric [No Focal Deficits] : no focal deficits [Oriented To Time, Place, And Person] : oriented to person, place, and time [Impaired Insight] : insight and judgment were intact [Affect] : the affect was normal [Mood] : the mood was normal [FreeTextEntry1] : varicose veins

## 2020-10-14 NOTE — DISCUSSION/SUMMARY
[FreeTextEntry1] : Acute Viral Bronchitis- Inf A - Resolved\par COVID AB Negative\par "Impaction distal bronchi\par DULERA 200-5  mcg 2 puffs BID and Rinse changed to Treley and off at  present\par Obstructive airway disease\par Physiology with a moderate obstruction and a 60% response to bronchodilator at the FEV1\par Other history as noted above\par Vaccination and pneumococcal profile up to date\par Advised if needs to use should notify us for evaluation\par [facility] cancer screening\par F/U [facility]

## 2020-10-14 NOTE — HISTORY OF PRESENT ILLNESS
[Never] : never [Stable] : are stable [None] : ~He/She~ has no significant interval events [Difficulty Breathing During Exertion] : denies dyspnea on exertion [Feelings Of Weakness On Exertion] : denies exercise intolerance [Cough] : denies coughing [Wheezing] : denies wheezing [Regional Soft Tissue Swelling Both Lower Extremities] : denies lower extremity edema [Chest Pain Or Discomfort] : denies chest pain [Fever] : denies fever [TextBox_4] : Overall feeling well. Denies cough/wheeze/SOB.  [Wt Gain ___ Lbs] : no recent weight gain [Oxygen] : the patient uses no supplemental oxygen [Wt Loss ___ Lbs] : no recent weight loss

## 2020-11-08 ENCOUNTER — RX RENEWAL (OUTPATIENT)
Age: 80
End: 2020-11-08

## 2020-12-21 PROBLEM — N39.0 ACUTE UTI: Status: RESOLVED | Noted: 2018-11-08 | Resolved: 2020-12-21

## 2020-12-21 PROBLEM — Z87.09 HISTORY OF ACUTE BRONCHITIS: Status: RESOLVED | Noted: 2019-12-20 | Resolved: 2020-12-21

## 2020-12-22 ENCOUNTER — RX RENEWAL (OUTPATIENT)
Age: 80
End: 2020-12-22

## 2021-01-06 DIAGNOSIS — Z20.822 CONTACT WITH AND (SUSPECTED) EXPOSURE TO COVID-19: ICD-10-CM

## 2021-01-08 ENCOUNTER — APPOINTMENT (OUTPATIENT)
Dept: DISASTER EMERGENCY | Facility: CLINIC | Age: 81
End: 2021-01-08

## 2021-01-08 DIAGNOSIS — Z01.818 ENCOUNTER FOR OTHER PREPROCEDURAL EXAMINATION: ICD-10-CM

## 2021-01-10 LAB — SARS-COV-2 N GENE NPH QL NAA+PROBE: NOT DETECTED

## 2021-01-13 ENCOUNTER — APPOINTMENT (OUTPATIENT)
Dept: PULMONOLOGY | Facility: CLINIC | Age: 81
End: 2021-01-13
Payer: MEDICARE

## 2021-01-13 VITALS
WEIGHT: 130 LBS | TEMPERATURE: 98.1 F | HEIGHT: 64 IN | BODY MASS INDEX: 22.2 KG/M2 | HEART RATE: 65 BPM | OXYGEN SATURATION: 99 % | DIASTOLIC BLOOD PRESSURE: 85 MMHG | SYSTOLIC BLOOD PRESSURE: 154 MMHG

## 2021-01-13 PROCEDURE — 71046 X-RAY EXAM CHEST 2 VIEWS: CPT

## 2021-01-13 PROCEDURE — 94010 BREATHING CAPACITY TEST: CPT

## 2021-01-13 PROCEDURE — ZZZZZ: CPT

## 2021-01-13 PROCEDURE — 94729 DIFFUSING CAPACITY: CPT

## 2021-01-13 PROCEDURE — 94727 GAS DIL/WSHOT DETER LNG VOL: CPT

## 2021-01-13 PROCEDURE — 99214 OFFICE O/P EST MOD 30 MIN: CPT | Mod: 25

## 2021-01-13 RX ORDER — NITROFURANTOIN (MONOHYDRATE/MACROCRYSTALS) 25; 75 MG/1; MG/1
100 CAPSULE ORAL
Qty: 10 | Refills: 0 | Status: DISCONTINUED | COMMUNITY
Start: 2020-03-26 | End: 2021-01-13

## 2021-01-13 NOTE — DISCUSSION/SUMMARY
[FreeTextEntry1] : \par COVID AB Negative\par "Impaction distal bronchi\par DULERA 200-5  mcg 2 puffs BID and Rinse changed to Treley and  advised to  restart\par Obstructive airway disease\par Mild decline pulmonary physiology- restart Trelegy Elipita\par Other history as noted above\par Vaccination and pneumococcal profile up to date\par Advised if needs to use should notify us for evaluation\par [facility] cancer screening\par F/U [facility]

## 2021-01-13 NOTE — PROCEDURE
[FreeTextEntry1] : PFT 1/13/21\par mild  mod redcution flow rates\par Moderate OAD\par Normal lung volumes\par Normal DLCO 79 % pred noted mild decline pulmonary physiology\par \par X-ray PA lateral projection January 13, 2021\par Borderline cardiomegaly\par Right lateral scoliosis\par Lung fields otherwise clear without parenchymal infiltrate pleural effusion or dominant pulmonary nodules\par Soft tissue bony structures otherwise grossly unremarkable\par Impression clear lungs no gross evidence for rib fractures or infiltrates\par No interval change compared to chest x-ray July 15, 2020\par \par Chest x-ray PA lateral July 15, 2020\par Borderline cardiomegaly\par Hyperaeration\par Clear lungs\par No parenchymal infiltrate pleural effusions or dominant pulmonary nodules\par No interval change compared to chest x-ray of December 20, 2019\par \par PFT Body BOX July 10 2020\par mild OAD\par  No BD at FEV1\par Normal lung volumes\par  sp conductance and resistance normal\par Diffusion 80 % pred normal\par  HGB 13.0 \par \par Spirometry March 9, 2020\par Mild obstructive ventilatory impairment\par No bronchodilator response at FEV1\par Stable FEV1\par \par Chest x-ray PA lateral December 20, 2019\par Normal cardiac size\par Mild right lateral scoliosis\par Suggestion of some bronchiectatic changes at right lower lung zone\par This is unchanged compared to the recent chest x-ray of December 16, 2019\par No consolidation consistent with active pneumonia\par \par States Flu vaccination up-to-date\par History of pneumococcal vaccination and Prevnar administered\par \par PFT December 16, 2019\par Mild obstructive ventilatory impairment\par No response to bronchodilator at the FEV1\par Lung volumes are normal with total capacity 95% predicted.\par Diffusion relatively normal 78% of predicted.\par Hemoglobin 13.7\par \par Chest x-ray PA lateral December 16 2019\par Mild cardiomegaly\par Left lower lung zone minimal discoid linear atelectatic change\par No parenchymal infiltrates pleural effusions dominant pulmonary nodules\par Soft tissue bony structures grossly normal\par Impression no evidence of pneumonia\par \par Data review 12/21/2019\par Serum sodium 134 with serum chloride 93 normal renal function\par CBC White blood count 7.37 hemoglobin 14.3 hematocrit 43.4\par Procalcitonin negative\par RVP positive influenza A\par \par CT chest December 26, 2019\par Right mid lower lung zone tree-in-bud with centrilobular groundglass nodular opacities most consistent with impacted distal airways\par \par Blood draw

## 2021-01-13 NOTE — CONSULT LETTER
[Dear  ___] : Dear  [unfilled], [Courtesy Letter:] : I had the pleasure of seeing your patient, [unfilled], in my office today. [Please see my note below.] : Please see my note below. [Sincerely,] : Sincerely, [FreeTextEntry3] : Bharat Arguelles D.O., JYOTI\par  of Medicine\par [facility] [employer]\par

## 2021-01-13 NOTE — HISTORY OF PRESENT ILLNESS
[Never] : never [Stable] : are stable [None] : ~He/She~ has no significant interval events [Difficulty Breathing During Exertion] : denies dyspnea on exertion [Feelings Of Weakness On Exertion] : denies exercise intolerance [Cough] : denies coughing [Wheezing] : denies wheezing [Regional Soft Tissue Swelling Both Lower Extremities] : denies lower extremity edema [Chest Pain Or Discomfort] : denies chest pain [Fever] : denies fever [TextBox_4] : Overall feeling well. Denies cough/wheeze/SOB.  [Wt Gain ___ Lbs] : no recent weight gain [Wt Loss ___ Lbs] : no recent weight loss [Oxygen] : the patient uses no supplemental oxygen

## 2021-01-13 NOTE — PHYSICAL EXAM
[General Appearance - Well Developed] : well developed [Normal Appearance] : normal appearance [Well Groomed] : well groomed [General Appearance - Well Nourished] : well nourished [No Deformities] : no deformities [General Appearance - In No Acute Distress] : no acute distress [Normal Oropharynx] : normal oropharynx [I] : I [Neck Appearance] : the appearance of the neck was normal [Neck Cervical Mass (___cm)] : no neck mass was observed [Jugular Venous Distention Increased] : there was no jugular-venous distention [Thyroid Diffuse Enlargement] : the thyroid was not enlarged [Heart Rate And Rhythm] : heart rate and rhythm were normal [Heart Sounds] : normal S1 and S2 [Murmurs] : no murmurs present [Arterial Pulses Normal] : the arterial pulses were normal [Edema] : no peripheral edema present [Veins - Varicosity Changes] : no varicosital changes were noted in the lower extremities [Respiration, Rhythm And Depth] : normal respiratory rhythm and effort [Exaggerated Use Of Accessory Muscles For Inspiration] : no accessory muscle use [Auscultation Breath Sounds / Voice Sounds] : lungs were clear to auscultation bilaterally [Chest Palpation] : palpation of the chest revealed no abnormalities [Lungs Percussion] : the lungs were normal to percussion [Abdomen Soft] : soft [Abdomen Tenderness] : non-tender [Abdomen Mass (___ Cm)] : no abdominal mass palpated [Abnormal Walk] : normal gait [Nail Clubbing] : no clubbing of the fingernails [Cyanosis, Localized] : no localized cyanosis [Petechial Hemorrhages (___cm)] : no petechial hemorrhages [Skin Color & Pigmentation] : normal skin color and pigmentation [] : no rash [No Venous Stasis] : no venous stasis [Skin Lesions] : no skin lesions [No Skin Ulcers] : no skin ulcer [No Xanthoma] : no  xanthoma was observed [Deep Tendon Reflexes (DTR)] : deep tendon reflexes were 2+ and symmetric [Sensation] : the sensory exam was normal to light touch and pinprick [No Focal Deficits] : no focal deficits [Oriented To Time, Place, And Person] : oriented to person, place, and time [Impaired Insight] : insight and judgment were intact [Affect] : the affect was normal [Mood] : the mood was normal [FreeTextEntry1] : varicose veins

## 2021-02-06 NOTE — END OF VISIT
06-Feb-2021 12:27 [>50% of Time Spent on Counseling for ____] : Greater than 50% of the encounter time was spent on counseling for [unfilled] [Time Spent: ___ minutes] : I have spent [unfilled] minutes of face to face time with the patient 06-Feb-2021 12:26

## 2021-02-07 ENCOUNTER — RX RENEWAL (OUTPATIENT)
Age: 81
End: 2021-02-07

## 2021-02-11 ENCOUNTER — APPOINTMENT (OUTPATIENT)
Dept: UROGYNECOLOGY | Facility: CLINIC | Age: 81
End: 2021-02-11
Payer: MEDICARE

## 2021-02-11 VITALS
HEART RATE: 78 BPM | DIASTOLIC BLOOD PRESSURE: 78 MMHG | TEMPERATURE: 97.3 F | SYSTOLIC BLOOD PRESSURE: 123 MMHG | HEIGHT: 64 IN | OXYGEN SATURATION: 98 %

## 2021-02-11 LAB
BILIRUB UR QL STRIP: NORMAL
CLARITY UR: CLEAR
COLLECTION METHOD: NORMAL
GLUCOSE UR-MCNC: NORMAL
HCG UR QL: 1 EU/DL
HGB UR QL STRIP.AUTO: NORMAL
KETONES UR-MCNC: NORMAL
LEUKOCYTE ESTERASE UR QL STRIP: NORMAL
NITRITE UR QL STRIP: NORMAL
PH UR STRIP: 6.5
PROT UR STRIP-MCNC: NORMAL
SP GR UR STRIP: 1.02

## 2021-02-11 PROCEDURE — 99213 OFFICE O/P EST LOW 20 MIN: CPT | Mod: 25

## 2021-02-11 PROCEDURE — 51701 INSERT BLADDER CATHETER: CPT

## 2021-02-17 ENCOUNTER — NON-APPOINTMENT (OUTPATIENT)
Age: 81
End: 2021-02-17

## 2021-02-17 ENCOUNTER — EMERGENCY (EMERGENCY)
Facility: HOSPITAL | Age: 81
LOS: 1 days | Discharge: HOME | End: 2021-02-17
Attending: STUDENT IN AN ORGANIZED HEALTH CARE EDUCATION/TRAINING PROGRAM | Admitting: STUDENT IN AN ORGANIZED HEALTH CARE EDUCATION/TRAINING PROGRAM
Payer: MEDICARE

## 2021-02-17 VITALS
DIASTOLIC BLOOD PRESSURE: 92 MMHG | WEIGHT: 130.07 LBS | RESPIRATION RATE: 18 BRPM | HEART RATE: 68 BPM | SYSTOLIC BLOOD PRESSURE: 152 MMHG | OXYGEN SATURATION: 99 % | HEIGHT: 64 IN | TEMPERATURE: 98 F

## 2021-02-17 VITALS
SYSTOLIC BLOOD PRESSURE: 144 MMHG | RESPIRATION RATE: 16 BRPM | TEMPERATURE: 99 F | HEART RATE: 70 BPM | DIASTOLIC BLOOD PRESSURE: 86 MMHG | OXYGEN SATURATION: 98 %

## 2021-02-17 LAB
ALBUMIN SERPL ELPH-MCNC: 4 G/DL — SIGNIFICANT CHANGE UP (ref 3.3–5)
ALP SERPL-CCNC: 75 U/L — SIGNIFICANT CHANGE UP (ref 40–120)
ALT FLD-CCNC: 24 U/L — SIGNIFICANT CHANGE UP (ref 12–78)
ANION GAP SERPL CALC-SCNC: 9 MMOL/L — SIGNIFICANT CHANGE UP (ref 5–17)
APPEARANCE UR: CLEAR — SIGNIFICANT CHANGE UP
AST SERPL-CCNC: 19 U/L — SIGNIFICANT CHANGE UP (ref 15–37)
BASOPHILS # BLD AUTO: 0.04 K/UL — SIGNIFICANT CHANGE UP (ref 0–0.2)
BASOPHILS NFR BLD AUTO: 0.8 % — SIGNIFICANT CHANGE UP (ref 0–2)
BILIRUB SERPL-MCNC: 0.5 MG/DL — SIGNIFICANT CHANGE UP (ref 0.2–1.2)
BILIRUB UR-MCNC: NEGATIVE — SIGNIFICANT CHANGE UP
BLD GP AB SCN SERPL QL: SIGNIFICANT CHANGE UP
BUN SERPL-MCNC: 9 MG/DL — SIGNIFICANT CHANGE UP (ref 7–23)
CALCIUM SERPL-MCNC: 9.1 MG/DL — SIGNIFICANT CHANGE UP (ref 8.5–10.1)
CHLORIDE SERPL-SCNC: 97 MMOL/L — SIGNIFICANT CHANGE UP (ref 96–108)
CO2 SERPL-SCNC: 27 MMOL/L — SIGNIFICANT CHANGE UP (ref 22–31)
COLOR SPEC: SIGNIFICANT CHANGE UP
CREAT SERPL-MCNC: 0.52 MG/DL — SIGNIFICANT CHANGE UP (ref 0.5–1.3)
DIFF PNL FLD: NEGATIVE — SIGNIFICANT CHANGE UP
EOSINOPHIL # BLD AUTO: 0.07 K/UL — SIGNIFICANT CHANGE UP (ref 0–0.5)
EOSINOPHIL NFR BLD AUTO: 1.4 % — SIGNIFICANT CHANGE UP (ref 0–6)
GLUCOSE SERPL-MCNC: 90 MG/DL — SIGNIFICANT CHANGE UP (ref 70–99)
GLUCOSE UR QL: NEGATIVE — SIGNIFICANT CHANGE UP
HCT VFR BLD CALC: 36.7 % — SIGNIFICANT CHANGE UP (ref 34.5–45)
HGB BLD-MCNC: 12.8 G/DL — SIGNIFICANT CHANGE UP (ref 11.5–15.5)
IMM GRANULOCYTES NFR BLD AUTO: 0.4 % — SIGNIFICANT CHANGE UP (ref 0–1.5)
KETONES UR-MCNC: NEGATIVE — SIGNIFICANT CHANGE UP
LACTATE SERPL-SCNC: 0.7 MMOL/L — SIGNIFICANT CHANGE UP (ref 0.7–2)
LEUKOCYTE ESTERASE UR-ACNC: NEGATIVE — SIGNIFICANT CHANGE UP
LIDOCAIN IGE QN: 131 U/L — SIGNIFICANT CHANGE UP (ref 73–393)
LYMPHOCYTES # BLD AUTO: 1.39 K/UL — SIGNIFICANT CHANGE UP (ref 1–3.3)
LYMPHOCYTES # BLD AUTO: 27.5 % — SIGNIFICANT CHANGE UP (ref 13–44)
MCHC RBC-ENTMCNC: 30.5 PG — SIGNIFICANT CHANGE UP (ref 27–34)
MCHC RBC-ENTMCNC: 34.9 GM/DL — SIGNIFICANT CHANGE UP (ref 32–36)
MCV RBC AUTO: 87.6 FL — SIGNIFICANT CHANGE UP (ref 80–100)
MONOCYTES # BLD AUTO: 0.65 K/UL — SIGNIFICANT CHANGE UP (ref 0–0.9)
MONOCYTES NFR BLD AUTO: 12.9 % — SIGNIFICANT CHANGE UP (ref 2–14)
NEUTROPHILS # BLD AUTO: 2.88 K/UL — SIGNIFICANT CHANGE UP (ref 1.8–7.4)
NEUTROPHILS NFR BLD AUTO: 57 % — SIGNIFICANT CHANGE UP (ref 43–77)
NITRITE UR-MCNC: NEGATIVE — SIGNIFICANT CHANGE UP
NRBC # BLD: 0 /100 WBCS — SIGNIFICANT CHANGE UP (ref 0–0)
PH UR: 7 — SIGNIFICANT CHANGE UP (ref 5–8)
PLATELET # BLD AUTO: 199 K/UL — SIGNIFICANT CHANGE UP (ref 150–400)
POTASSIUM SERPL-MCNC: 3.9 MMOL/L — SIGNIFICANT CHANGE UP (ref 3.5–5.3)
POTASSIUM SERPL-SCNC: 3.9 MMOL/L — SIGNIFICANT CHANGE UP (ref 3.5–5.3)
PROT SERPL-MCNC: 6.9 G/DL — SIGNIFICANT CHANGE UP (ref 6–8.3)
PROT UR-MCNC: NEGATIVE — SIGNIFICANT CHANGE UP
RBC # BLD: 4.19 M/UL — SIGNIFICANT CHANGE UP (ref 3.8–5.2)
RBC # FLD: 12 % — SIGNIFICANT CHANGE UP (ref 10.3–14.5)
SARS-COV-2 RNA SPEC QL NAA+PROBE: SIGNIFICANT CHANGE UP
SODIUM SERPL-SCNC: 133 MMOL/L — LOW (ref 135–145)
SP GR SPEC: 1.01 — SIGNIFICANT CHANGE UP (ref 1.01–1.02)
UROBILINOGEN FLD QL: NEGATIVE — SIGNIFICANT CHANGE UP
WBC # BLD: 5.05 K/UL — SIGNIFICANT CHANGE UP (ref 3.8–10.5)
WBC # FLD AUTO: 5.05 K/UL — SIGNIFICANT CHANGE UP (ref 3.8–10.5)

## 2021-02-17 PROCEDURE — 99285 EMERGENCY DEPT VISIT HI MDM: CPT

## 2021-02-17 PROCEDURE — U0003: CPT

## 2021-02-17 PROCEDURE — 86901 BLOOD TYPING SEROLOGIC RH(D): CPT

## 2021-02-17 PROCEDURE — 86900 BLOOD TYPING SEROLOGIC ABO: CPT

## 2021-02-17 PROCEDURE — 93005 ELECTROCARDIOGRAM TRACING: CPT

## 2021-02-17 PROCEDURE — 93010 ELECTROCARDIOGRAM REPORT: CPT

## 2021-02-17 PROCEDURE — 96361 HYDRATE IV INFUSION ADD-ON: CPT

## 2021-02-17 PROCEDURE — 96375 TX/PRO/DX INJ NEW DRUG ADDON: CPT

## 2021-02-17 PROCEDURE — 74177 CT ABD & PELVIS W/CONTRAST: CPT | Mod: 26,MA

## 2021-02-17 PROCEDURE — 36415 COLL VENOUS BLD VENIPUNCTURE: CPT

## 2021-02-17 PROCEDURE — 96374 THER/PROPH/DIAG INJ IV PUSH: CPT | Mod: XU

## 2021-02-17 PROCEDURE — 80053 COMPREHEN METABOLIC PANEL: CPT

## 2021-02-17 PROCEDURE — 99284 EMERGENCY DEPT VISIT MOD MDM: CPT | Mod: 25

## 2021-02-17 PROCEDURE — 84484 ASSAY OF TROPONIN QUANT: CPT

## 2021-02-17 PROCEDURE — 83690 ASSAY OF LIPASE: CPT

## 2021-02-17 PROCEDURE — 74177 CT ABD & PELVIS W/CONTRAST: CPT

## 2021-02-17 PROCEDURE — 76705 ECHO EXAM OF ABDOMEN: CPT | Mod: 26

## 2021-02-17 PROCEDURE — U0005: CPT

## 2021-02-17 PROCEDURE — 87086 URINE CULTURE/COLONY COUNT: CPT

## 2021-02-17 PROCEDURE — 86850 RBC ANTIBODY SCREEN: CPT

## 2021-02-17 PROCEDURE — 85025 COMPLETE CBC W/AUTO DIFF WBC: CPT

## 2021-02-17 PROCEDURE — 76705 ECHO EXAM OF ABDOMEN: CPT

## 2021-02-17 PROCEDURE — 81003 URINALYSIS AUTO W/O SCOPE: CPT

## 2021-02-17 PROCEDURE — 83605 ASSAY OF LACTIC ACID: CPT

## 2021-02-17 RX ORDER — ONDANSETRON 8 MG/1
4 TABLET, FILM COATED ORAL ONCE
Refills: 0 | Status: COMPLETED | OUTPATIENT
Start: 2021-02-17 | End: 2021-02-17

## 2021-02-17 RX ORDER — FENTANYL CITRATE 50 UG/ML
50 INJECTION INTRAVENOUS ONCE
Refills: 0 | Status: DISCONTINUED | OUTPATIENT
Start: 2021-02-17 | End: 2021-02-17

## 2021-02-17 RX ORDER — FAMOTIDINE 10 MG/ML
20 INJECTION INTRAVENOUS ONCE
Refills: 0 | Status: COMPLETED | OUTPATIENT
Start: 2021-02-17 | End: 2021-02-17

## 2021-02-17 RX ORDER — SODIUM CHLORIDE 9 MG/ML
500 INJECTION INTRAMUSCULAR; INTRAVENOUS; SUBCUTANEOUS ONCE
Refills: 0 | Status: COMPLETED | OUTPATIENT
Start: 2021-02-17 | End: 2021-02-17

## 2021-02-17 RX ORDER — SODIUM CHLORIDE 9 MG/ML
1000 INJECTION INTRAMUSCULAR; INTRAVENOUS; SUBCUTANEOUS ONCE
Refills: 0 | Status: COMPLETED | OUTPATIENT
Start: 2021-02-17 | End: 2021-02-17

## 2021-02-17 RX ORDER — ACETAMINOPHEN 500 MG
1000 TABLET ORAL ONCE
Refills: 0 | Status: COMPLETED | OUTPATIENT
Start: 2021-02-17 | End: 2021-02-17

## 2021-02-17 RX ADMIN — SODIUM CHLORIDE 500 MILLILITER(S): 9 INJECTION INTRAMUSCULAR; INTRAVENOUS; SUBCUTANEOUS at 12:54

## 2021-02-17 RX ADMIN — Medication 400 MILLIGRAM(S): at 14:57

## 2021-02-17 RX ADMIN — SODIUM CHLORIDE 1000 MILLILITER(S): 9 INJECTION INTRAMUSCULAR; INTRAVENOUS; SUBCUTANEOUS at 16:28

## 2021-02-17 RX ADMIN — FAMOTIDINE 20 MILLIGRAM(S): 10 INJECTION INTRAVENOUS at 12:58

## 2021-02-17 RX ADMIN — Medication 1000 MILLIGRAM(S): at 15:07

## 2021-02-17 RX ADMIN — FENTANYL CITRATE 50 MICROGRAM(S): 50 INJECTION INTRAVENOUS at 16:25

## 2021-02-17 RX ADMIN — SODIUM CHLORIDE 500 MILLILITER(S): 9 INJECTION INTRAMUSCULAR; INTRAVENOUS; SUBCUTANEOUS at 13:54

## 2021-02-17 RX ADMIN — SODIUM CHLORIDE 1000 MILLILITER(S): 9 INJECTION INTRAMUSCULAR; INTRAVENOUS; SUBCUTANEOUS at 17:28

## 2021-02-17 RX ADMIN — ONDANSETRON 4 MILLIGRAM(S): 8 TABLET, FILM COATED ORAL at 12:57

## 2021-02-17 NOTE — ED PROVIDER NOTE - CARE PROVIDER_API CALL
Héctor Frost  GASTROENTEROLOGY  [address] [place] [id number]  Phone: [phone]  Fax: [phone]  Established Patient  Follow Up Time: 1-3 Days

## 2021-02-17 NOTE — ED ADULT NURSE NOTE - OBJECTIVE STATEMENT
Received pt alert and orientated. Pt is complaining of right abd pain. Pt denies SOB and abd pain. Pt is able to walk. Pt can move all extremities. Pt states she has been having this pain for weeks and went to urgent care. Pt also called her GI pt has been decrease in eating and has some nausea. Safety/Comfort maintained. Call bell within reach. Freq rounding performed. Will continue to monitor. Pt has tenderness on right side of abd.

## 2021-02-17 NOTE — ED PROVIDER NOTE - NS ED ROS FT
Constitutional: No fever.  Neurological: No headache.  Eyes: No vision changes.   Ears, Nose, Mouth, Throat: No congestion.  Cardiovascular: No chest pain.  Respiratory: No difficulty breathing.  Gastrointestinal: +nausea / vomiting / abdominal pain.  Genitourinary: No dysuria.  Musculoskeletal: No joint pain.  Integumentary (skin and/or breast): No rash.

## 2021-02-17 NOTE — ED PROVIDER NOTE - PHYSICAL EXAMINATION
General:  Comfortable, no acute distress.  Head:  Normocephalic.  Eyes:  Conjunctiva pink, no icterus.  Ear, Nose, Mouth, Throat:  No obvious congestion.  Cardiovascular:  Regular rate, no obvious murmur.  Respiratory:  Clear to auscultation with good air entry bilaterally.  Abdomen:  Soft, non-tender, non-distended.  No rebound, no guarding. Strauss's +. McBurney's negative.  Urologic: + right CVA tenderness.  Musculoskeletal:  No deformity, edema, or calf tenderness.  Neurologic: Alert and oriented, gross motor and sensory intact.  Skin:  Warm and dry.  Psychiatric: Normal affect.

## 2021-02-17 NOTE — ED PROVIDER NOTE - OBJECTIVE STATEMENT
80 F history of COPD, CAD, HTN, Hashimoto's, s/p hysterectomy here complaining of 2 weeks of worsening right sided abdominal pain. pain is in right upper quadrant, worse after eating. patient was constipated but took meds this weekend and has been having loose stools since then. also with nausea and bad taste in mouth.

## 2021-02-17 NOTE — ED ADULT NURSE NOTE - NSIMPLEMENTINTERV_GEN_ALL_ED
Implemented All Fall Risk Interventions:  [place] to call system. Call bell, personal items and telephone within reach. Instruct patient to call for assistance. Room bathroom lighting operational. Non-slip footwear when patient is off stretcher. Physically safe environment: no spills, clutter or unnecessary equipment. Stretcher in lowest position, wheels locked, appropriate side rails in place. Provide visual cue, wrist band, yellow gown, etc. Monitor gait and stability. Monitor for mental status changes and reorient to person, place, and time. Review medications for side effects contributing to fall risk. Reinforce activity limits and safety measures with patient and family.

## 2021-02-17 NOTE — ED PROVIDER NOTE - PATIENT PORTAL LINK FT
You can access the FollowMyHealth Patient Portal offered by [facility] by registering at the following website: [email or web address]. By joining [employer]’s FollowMyHealth portal, you will also be able to view your health information using other applications (apps) compatible with our system.

## 2021-02-17 NOTE — ED PROVIDER NOTE - PRIOR EKG STATUS
cannot rule out Anterior infarct listed in prior read, EKG unable to be visualized./there is no prior EKG available for comparison

## 2021-02-17 NOTE — ED PROVIDER NOTE - CLINICAL SUMMARY MEDICAL DECISION MAKING FREE TEXT BOX
r/o gallbladder v kidney pathology v colitis. given risk factors will screen for ACS. May be PUD/Gastritis. Doubt bowel obstruction.

## 2021-02-18 LAB
CULTURE RESULTS: SIGNIFICANT CHANGE UP
SPECIMEN SOURCE: SIGNIFICANT CHANGE UP

## 2021-02-26 ENCOUNTER — APPOINTMENT (OUTPATIENT)
Dept: UROGYNECOLOGY | Facility: CLINIC | Age: 81
End: 2021-02-26
Payer: MEDICARE

## 2021-02-26 LAB
BILIRUB UR QL STRIP: NORMAL
CLARITY UR: CLEAR
COLLECTION METHOD: NORMAL
GLUCOSE UR-MCNC: NORMAL
HCG UR QL: 1 EU/DL
HGB UR QL STRIP.AUTO: NORMAL
KETONES UR-MCNC: NORMAL
LEUKOCYTE ESTERASE UR QL STRIP: NORMAL
NITRITE UR QL STRIP: NORMAL
PH UR STRIP: 7
PROT UR STRIP-MCNC: NORMAL
SP GR UR STRIP: 1.01

## 2021-02-26 PROCEDURE — 99213 OFFICE O/P EST LOW 20 MIN: CPT

## 2021-02-26 NOTE — HISTORY OF PRESENT ILLNESS
[FreeTextEntry1] : Patient presents to the office with complaints of dysuria x 5 days.  States she was seen at [place] emergency room last Wednesday 2/17/21, they obtained a catheterized urine sample and pt is unsure if this is what caused the burning to start.  Denies fever, chills, hematuria or low back pain.  States she has a history of urinary frequency and urgency.  She is using vaginal estrogen as prescribed.

## 2021-02-26 NOTE — PROCEDURE
[Straight Catheterization] : insertion of a straight catheter [Other ___] : [unfilled] [Patient] : the patient [___ Fr Straight Tip] : a [unfilled] in [place] straight tip catheter [Clear] : clear [Culture] : culture [Urinalysis] : urinalysis [No Complications] : no complications [Tolerated Well] : the patient tolerated the procedure well [Post procedure instructions and information given] : Post procedure instructions and information were given and reviewed with patient. [0] : 0 [FreeTextEntry1] : urethra cleared, catheter passed. No CVAT\par

## 2021-02-26 NOTE — PHYSICAL EXAM
[No Acute Distress] : in no acute distress [Well developed] : well developed [Well Nourished] : ~L well nourished [Good Hygeine] : demonstrates good hygeine [Oriented x3] : oriented to person, place, and time [Normal Memory] : ~T memory was ~L unimpaired [Normal Mood/Affect] : mood and affect are normal [None] : no CVA tenderness [Warm and Dry] : was warm and dry to touch [Normal Gait] : gait was normal [Labia Majora] : were normal [Labia Minora] : were normal [Normal] : was normal [Normal Appearance] : general appearance was normal [Atrophy] : atrophy [Anxiety] : patient is not anxious [Tenderness] : ~T no ~M abdominal tenderness observed [Distended] : not distended

## 2021-02-26 NOTE — DISCUSSION/SUMMARY
[FreeTextEntry1] : 1. Dysuria\par - POCT Udip neg elvira/nit;  due to patients symptoms formal UA/CS sent to lab. Patient will wait for results for treatment\par - Advised patient to use OTC Azo or Uristat for discomfort as needed\par - Continue using Vaginal estrogen as prescribed\par - Advised to use Desitin or vaginal lubrication as barrier protection to prevent irritations\par \par Patient agrees to call office with any questions or concerns, verbalized understanding.

## 2021-03-01 LAB
APPEARANCE: CLEAR
BACTERIA UR CULT: NORMAL
BACTERIA: NEGATIVE
BILIRUBIN URINE: NEGATIVE
BLOOD URINE: NEGATIVE
COLOR: YELLOW
GLUCOSE QUALITATIVE U: NEGATIVE
HYALINE CASTS: 1 /LPF
KETONES URINE: NEGATIVE
LEUKOCYTE ESTERASE URINE: NEGATIVE
MICROSCOPIC-UA: NORMAL
NITRITE URINE: NEGATIVE
PH URINE: 7
PROTEIN URINE: NEGATIVE
RED BLOOD CELLS URINE: 1 /HPF
SPECIFIC GRAVITY URINE: 1.01
SQUAMOUS EPITHELIAL CELLS: 0 /HPF
UROBILINOGEN URINE: NORMAL
WHITE BLOOD CELLS URINE: 1 /HPF

## 2021-04-13 LAB — SARS-COV-2 N GENE NPH QL NAA+PROBE: NOT DETECTED

## 2021-04-15 ENCOUNTER — APPOINTMENT (OUTPATIENT)
Dept: PULMONOLOGY | Facility: CLINIC | Age: 81
End: 2021-04-15
Payer: MEDICARE

## 2021-04-15 VITALS
TEMPERATURE: 98 F | OXYGEN SATURATION: 98 % | SYSTOLIC BLOOD PRESSURE: 112 MMHG | HEART RATE: 60 BPM | RESPIRATION RATE: 16 BRPM | DIASTOLIC BLOOD PRESSURE: 72 MMHG

## 2021-04-15 LAB — POCT - HEMOGLOBIN (HGB), QUANTITATIVE, TRANSCUTANEOUS: 10.4

## 2021-04-15 PROCEDURE — 94727 GAS DIL/WSHOT DETER LNG VOL: CPT

## 2021-04-15 PROCEDURE — 94729 DIFFUSING CAPACITY: CPT

## 2021-04-15 PROCEDURE — 94010 BREATHING CAPACITY TEST: CPT

## 2021-04-15 PROCEDURE — 88738 HGB QUANT TRANSCUTANEOUS: CPT

## 2021-04-15 PROCEDURE — 95012 NITRIC OXIDE EXP GAS DETER: CPT

## 2021-04-15 PROCEDURE — ZZZZZ: CPT

## 2021-04-15 PROCEDURE — 99214 OFFICE O/P EST MOD 30 MIN: CPT | Mod: 25

## 2021-04-15 NOTE — CONSULT LETTER
[Dear  ___] : Dear  [unfilled], [Courtesy Letter:] : I had the pleasure of seeing your patient, [unfilled], in my office today. [Please see my note below.] : Please see my note below. [Sincerely,] : Sincerely, [FreeTextEntry3] : Bharat Arguelles D.O., JYOTI\par  of Medicine\par [employer] [facility] [employer]\par

## 2021-04-15 NOTE — DISCUSSION/SUMMARY
[FreeTextEntry1] : \par COVID AB Negative\par "Impaction distal bronchi\par Obstructive airway disease\par Mild decline pulmonary physiology- restart Trelegy Elimista- Continue\par Other history as noted above\par Vaccination and pneumococcal profile up to date\par Advised if needs to use should notify us for evaluation\par [facility] cancer screening\par F/U Héctor Frost Colonoscopy\par Spinal disease  with possible epidural vs surgical procedure\par From pulmonary perspective no  absolute  contraindication to undergo spinal surgery\par PFIZER  completed

## 2021-04-15 NOTE — PROCEDURE
[FreeTextEntry1] : PFT 4/15/21\par Mild  borderline  moderate  OAD\par Lung volumes normal range\par DLCO  83 %\par HGGB 10.4\par NIOX  12  Nl range  4/15/21\par \par PFT 1/13/21\par mild  mod redcution flow rates\par Moderate OAD\par Normal lung volumes\par Normal DLCO 79 % pred noted mild decline pulmonary physiology\par \par X-ray PA lateral projection January 13, 2021\par Borderline cardiomegaly\par Right lateral scoliosis\par Lung fields otherwise clear without parenchymal infiltrate pleural effusion or dominant pulmonary nodules\par Soft tissue bony structures otherwise grossly unremarkable\par Impression clear lungs no gross evidence for rib fractures or infiltrates\par No interval change compared to chest x-ray July 15, 2020\par \par Chest x-ray PA lateral July 15, 2020\par Borderline cardiomegaly\par Hyperaeration\par Clear lungs\par No parenchymal infiltrate pleural effusions or dominant pulmonary nodules\par No interval change compared to chest x-ray of December 20, 2019\par \par PFT Body BOX July 10 2020\par mild OAD\par  No BD at FEV1\par Normal lung volumes\par  sp conductance and resistance normal\par Diffusion 80 % pred normal\par  HGB 13.0 \par \par Spirometry March 9, 2020\par Mild obstructive ventilatory impairment\par No bronchodilator response at FEV1\par Stable FEV1\par \par Chest x-ray PA lateral December 20, 2019\par Normal cardiac size\par Mild right lateral scoliosis\par Suggestion of some bronchiectatic changes at right lower lung zone\par This is unchanged compared to the recent chest x-ray of December 16, 2019\par No consolidation consistent with active pneumonia\par \par States Flu vaccination up-to-date\par History of pneumococcal vaccination and Prevnar administered\par \par PFT December 16, 2019\par Mild obstructive ventilatory impairment\par No response to bronchodilator at the FEV1\par Lung volumes are normal with total capacity 95% predicted.\par Diffusion relatively normal 78% of predicted.\par Hemoglobin 13.7\par \par Chest x-ray PA lateral December 16 2019\par Mild cardiomegaly\par Left lower lung zone minimal discoid linear atelectatic change\par No parenchymal infiltrates pleural effusions dominant pulmonary nodules\par Soft tissue bony structures grossly normal\par Impression no evidence of pneumonia\par \par Data review 12/21/2019\par Serum sodium 134 with serum chloride 93 normal renal function\par CBC White blood count 7.37 hemoglobin 14.3 hematocrit 43.4\par Procalcitonin negative\par RVP positive influenza A\par \par CT chest December 26, 2019\par Right mid lower lung zone tree-in-bud with centrilobular groundglass nodular opacities most consistent with impacted distal airways\par \par Blood draw

## 2021-04-23 ENCOUNTER — OUTPATIENT (OUTPATIENT)
Dept: OUTPATIENT SERVICES | Facility: HOSPITAL | Age: 81
LOS: 1 days | End: 2021-04-23
Payer: MEDICARE

## 2021-04-23 DIAGNOSIS — Z20.828 CONTACT WITH AND (SUSPECTED) EXPOSURE TO OTHER VIRAL COMMUNICABLE DISEASES: ICD-10-CM

## 2021-04-23 LAB — SARS-COV-2 RNA SPEC QL NAA+PROBE: SIGNIFICANT CHANGE UP

## 2021-04-23 PROCEDURE — U0003: CPT

## 2021-04-23 PROCEDURE — U0005: CPT

## 2021-04-26 ENCOUNTER — OUTPATIENT (OUTPATIENT)
Dept: OUTPATIENT SERVICES | Facility: HOSPITAL | Age: 81
LOS: 1 days | End: 2021-04-26
Payer: MEDICARE

## 2021-04-26 DIAGNOSIS — M54.16 RADICULOPATHY, LUMBAR REGION: ICD-10-CM

## 2021-04-26 PROCEDURE — 62323 NJX INTERLAMINAR LMBR/SAC: CPT

## 2021-05-04 ENCOUNTER — APPOINTMENT (OUTPATIENT)
Dept: MRI IMAGING | Facility: CLINIC | Age: 81
End: 2021-05-04
Payer: MEDICARE

## 2021-05-04 ENCOUNTER — OUTPATIENT (OUTPATIENT)
Dept: OUTPATIENT SERVICES | Facility: HOSPITAL | Age: 81
LOS: 1 days | End: 2021-05-04
Payer: MEDICARE

## 2021-05-04 DIAGNOSIS — Z00.8 ENCOUNTER FOR OTHER GENERAL EXAMINATION: ICD-10-CM

## 2021-05-04 PROCEDURE — 73721 MRI JNT OF LWR EXTRE W/O DYE: CPT | Mod: 26,RT,MH

## 2021-05-04 PROCEDURE — 73721 MRI JNT OF LWR EXTRE W/O DYE: CPT

## 2021-05-07 ENCOUNTER — OUTPATIENT (OUTPATIENT)
Dept: OUTPATIENT SERVICES | Facility: HOSPITAL | Age: 81
LOS: 1 days | End: 2021-05-07
Payer: MEDICARE

## 2021-05-07 DIAGNOSIS — Z20.828 CONTACT WITH AND (SUSPECTED) EXPOSURE TO OTHER VIRAL COMMUNICABLE DISEASES: ICD-10-CM

## 2021-05-07 LAB — SARS-COV-2 RNA SPEC QL NAA+PROBE: SIGNIFICANT CHANGE UP

## 2021-05-07 PROCEDURE — U0005: CPT

## 2021-05-07 PROCEDURE — U0003: CPT

## 2021-05-10 ENCOUNTER — OUTPATIENT (OUTPATIENT)
Dept: OUTPATIENT SERVICES | Facility: HOSPITAL | Age: 81
LOS: 1 days | End: 2021-05-10
Payer: MEDICARE

## 2021-05-10 DIAGNOSIS — M54.16 RADICULOPATHY, LUMBAR REGION: ICD-10-CM

## 2021-05-10 PROCEDURE — 62323 NJX INTERLAMINAR LMBR/SAC: CPT

## 2021-05-20 ENCOUNTER — APPOINTMENT (OUTPATIENT)
Dept: UROGYNECOLOGY | Facility: CLINIC | Age: 81
End: 2021-05-20
Payer: MEDICARE

## 2021-05-20 VITALS
OXYGEN SATURATION: 98 % | WEIGHT: 135 LBS | TEMPERATURE: 97.8 F | HEART RATE: 54 BPM | HEIGHT: 64 IN | DIASTOLIC BLOOD PRESSURE: 73 MMHG | BODY MASS INDEX: 23.05 KG/M2 | SYSTOLIC BLOOD PRESSURE: 160 MMHG

## 2021-05-20 LAB
BILIRUB UR QL STRIP: NORMAL
COLLECTION METHOD: NORMAL
GLUCOSE UR-MCNC: NORMAL
HCG UR QL: 1 EU/DL
HGB UR QL STRIP.AUTO: NORMAL
KETONES UR-MCNC: NORMAL
LEUKOCYTE ESTERASE UR QL STRIP: NORMAL
NITRITE UR QL STRIP: NORMAL
PH UR STRIP: 7
PROT UR STRIP-MCNC: NORMAL
SP GR UR STRIP: 1.01

## 2021-05-20 PROCEDURE — 99213 OFFICE O/P EST LOW 20 MIN: CPT | Mod: 25

## 2021-05-20 PROCEDURE — 51701 INSERT BLADDER CATHETER: CPT

## 2021-05-20 NOTE — PHYSICAL EXAM
[No Acute Distress] : in no acute distress [Well developed] : well developed [Well Nourished] : ~L well nourished [Good Hygeine] : demonstrates good hygeine [Oriented x3] : oriented to person, place, and time [Normal Memory] : ~T memory was ~L unimpaired [Normal Mood/Affect] : mood and affect are normal [Tenderness] : ~T no ~M abdominal tenderness observed [Distended] : not distended [None] : no CVA tenderness [Warm and Dry] : was warm and dry to touch [Vulvar Atrophy] : vulvar atrophy [Labia Majora] : were normal [Labia Minora] : were normal

## 2021-05-20 NOTE — HISTORY OF PRESENT ILLNESS
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 Pt increased PO fluids daily and only taking Tylenol PRN for relief.  Denies any abd/pelvic pains, hematuria, oliguria, F/CN/V/D/C. Pt is applying Estrace cream twice weekly.

## 2021-05-20 NOTE — PROCEDURE
[Straight Catheterization] : insertion of a straight catheter [Urinary Frequency] : urinary frequency [Patient] : the patient [___ Fr Straight Tip] : a [unfilled] in [place] straight tip catheter [None] : there were no complications with the catheter insertion [Cloudy] : cloudy [Culture] : culture [Urinalysis] : urinalysis [No Complications] : no complications [Tolerated Well] : the patient tolerated the procedure well [Post procedure instructions and information given] : Post procedure instructions and information were given and reviewed with patient. [de-identified] : PVR Cloudy urine; +Small Leukocytes, Neg Nitrites, Blood

## 2021-05-20 NOTE — DISCUSSION/SUMMARY
[FreeTextEntry1] : Office catheterized urine dip: Cloudy urine, +Small Leukocytes, Neg Nitrites, Blood\par UA/CS Sent; Pt given Bactrim Ds to start and will call when UCS results available if need to change Rx\par Con'st Estrace cream PV twice weekly\par Advised con't increased PO fluids daily\par Instructed pt to call the office if any problems or concerns and she verbalized understanding.\par

## 2021-05-25 ENCOUNTER — NON-APPOINTMENT (OUTPATIENT)
Age: 81
End: 2021-05-25

## 2021-05-25 LAB
APPEARANCE: CLEAR
BACTERIA: NEGATIVE
BILIRUBIN URINE: NEGATIVE
BLOOD URINE: NEGATIVE
COLOR: YELLOW
GLUCOSE QUALITATIVE U: NEGATIVE
HYALINE CASTS: 1 /LPF
KETONES URINE: NEGATIVE
LEUKOCYTE ESTERASE URINE: ABNORMAL
MICROSCOPIC-UA: NORMAL
NITRITE URINE: NEGATIVE
PH URINE: 7
PROTEIN URINE: NEGATIVE
RED BLOOD CELLS URINE: 2 /HPF
SPECIFIC GRAVITY URINE: 1.01
SQUAMOUS EPITHELIAL CELLS: 0 /HPF
UROBILINOGEN URINE: NORMAL
WHITE BLOOD CELLS URINE: 83 /HPF

## 2021-06-01 ENCOUNTER — NON-APPOINTMENT (OUTPATIENT)
Age: 81
End: 2021-06-01

## 2021-06-03 ENCOUNTER — APPOINTMENT (OUTPATIENT)
Dept: UROGYNECOLOGY | Facility: CLINIC | Age: 81
End: 2021-06-03
Payer: MEDICARE

## 2021-06-03 DIAGNOSIS — N95.2 POSTMENOPAUSAL ATROPHIC VAGINITIS: ICD-10-CM

## 2021-06-03 LAB
BILIRUB UR QL STRIP: NORMAL
CLARITY UR: CLEAR
COLLECTION METHOD: NORMAL
GLUCOSE UR-MCNC: NORMAL
HCG UR QL: 0.2 EU/DL
HGB UR QL STRIP.AUTO: NORMAL
KETONES UR-MCNC: NORMAL
LEUKOCYTE ESTERASE UR QL STRIP: NORMAL
NITRITE UR QL STRIP: NORMAL
PH UR STRIP: 7
PROT UR STRIP-MCNC: NORMAL
SP GR UR STRIP: 1.02

## 2021-06-03 PROCEDURE — 99213 OFFICE O/P EST LOW 20 MIN: CPT | Mod: 25

## 2021-06-03 NOTE — PROCEDURE
[Straight Catheterization] : insertion of a straight catheter [Urinary Frequency] : urinary frequency [Other ___] : [unfilled] [Patient] : the patient [___ Fr Straight Tip] : a [unfilled] in [place] straight tip catheter [None] : there were no complications with the catheter insertion [Clear] : clear [No Complications] : no complications [Tolerated Well] : the patient tolerated the procedure well [Post procedure instructions and information given] : Post procedure instructions and information were given and reviewed with patient. [de-identified] : Clear urine; Neg Nitrites, Leuks and Blood

## 2021-06-03 NOTE — HISTORY OF PRESENT ILLNESS
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 Faecalis 3/25/21 presents to office c/o 2 days after completed antibiotics course with persistent recurrence of dysuria and frequency.  Pt took AZO x1 day with temporary relief. Denies assoc abd/pelvic/back pains, hematuria, oliguria, F/C/N/V/D/C. Pt applies vag tabs twice weekly and topical Desitin ointment. Pt also has left over tube with small amount left of estrace cream which she has occasionally applied topically to vaginal opening.  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 Aaron Ruffin, with cystoscopy. Pt is due for cystoscopy appt this year and plans to schedule soon. Reports last cysto was "WNL"

## 2021-06-03 NOTE — DISCUSSION/SUMMARY
[FreeTextEntry1] : Office catheterized urine dip: Clear urine; Neg Nitrites, Leuks and Blood; Pt is s/p course of Bactrim DS x3days, then Macrobid x5days for +e. Faecalis UTI\par Reassured pt WNL office urine dip, negative for UTI\par VVA-con't Estradiol tabs PV twice daily\par Recommended con't applying thin layer topical Estrace cream to vaginal opening every other night and daily topical Aquaphor ointment frequently\par Escribed refill Rx Estrace cream to pharmacy\par Con't OTC AZO or Cystex prn for dysuria symptoms\par Advised sched annual f/u Cystocopy with [facility], Dr. Aaron Ruffin, and fax reports to our office for review\par RTO 6 months or sooner prn\par Instructed pt to call the office if any problems or concerns and she verbalized understanding.\par

## 2021-06-03 NOTE — PHYSICAL EXAM
[No Acute Distress] : in no acute distress [Well developed] : well developed [Well Nourished] : ~L well nourished [Good Hygeine] : demonstrates good hygeine [Oriented x3] : oriented to person, place, and time [Normal Memory] : ~T memory was ~L unimpaired [Normal Mood/Affect] : mood and affect are normal [Tenderness] : ~T no ~M abdominal tenderness observed [Distended] : not distended [Warm and Dry] : was warm and dry to touch [Normal Gait] : gait was normal [Vulvar Atrophy] : vulvar atrophy [Labia Majora] : were normal [Labia Minora] : were normal [de-identified] : N Patient refused

## 2021-07-09 ENCOUNTER — NON-APPOINTMENT (OUTPATIENT)
Age: 81
End: 2021-07-09

## 2021-07-09 ENCOUNTER — APPOINTMENT (OUTPATIENT)
Dept: UROGYNECOLOGY | Facility: CLINIC | Age: 81
End: 2021-07-09
Payer: MEDICARE

## 2021-07-09 PROCEDURE — 51701 INSERT BLADDER CATHETER: CPT

## 2021-07-09 PROCEDURE — 99214 OFFICE O/P EST MOD 30 MIN: CPT | Mod: 25

## 2021-07-11 ENCOUNTER — NON-APPOINTMENT (OUTPATIENT)
Age: 81
End: 2021-07-11

## 2021-07-12 NOTE — HISTORY OF PRESENT ILLNESS
[FreeTextEntry1] : Pt w/ known hx SANDEEP's presents to office today c/o this AM with severe sharp stabbing pain at urethra with onset of urine stream to void. Pt immediately took OTC Uristat with only slight relief and dysuria recurred with next 2 episodes of voiding today.  Pt denies any abd/pelvic/vaginal pains, bleeding, hematuria, oliguria, F/C/N/V/D/C. Pt has been applying Estrace cream BIW HS PV and also thin layer topically at urethra without issues.

## 2021-07-12 NOTE — PROCEDURE
[Straight Catheterization] : insertion of a straight catheter [Other ___] : [unfilled] [Patient] : the patient [___ Fr Straight Tip] : a [unfilled] in [place]ien straight tip catheter [None] : there were no complications with the catheter insertion [Other: ___] : [unfilled] [Culture] : culture [No Complications] : no complications [Tolerated Well] : the patient tolerated the procedure well [Post procedure instructions and information given] : Post procedure instructions and information were given and reviewed with patient. [de-identified] : Orange discolored urine d/t pt took Uristat

## 2021-07-12 NOTE — DISCUSSION/SUMMARY
[FreeTextEntry1] : Office catheterized urine obtained; Dark orange discolored urine d/t pt took OTC Uristat; unable to perform dip or send UA; Formal UCS Sent\par Pt escribed Pyridium Rx for dysuria; will call pt when results available if need Rx; Advised pt to call if symptoms worsen and speak w/ on call physician for tx over the weekend and she verbalized understanding\par Advised increase daily po fluids, cranberry pills\par Pt has annual f/u and cystoscopy with [facility] and advised to con't f/u's\par x6 month f/u or sooner prn\par Instructed pt to call the office if any problems or concerns and she verbalized understanding.\par

## 2021-07-14 ENCOUNTER — NON-APPOINTMENT (OUTPATIENT)
Age: 81
End: 2021-07-14

## 2021-07-21 ENCOUNTER — RESULT REVIEW (OUTPATIENT)
Age: 81
End: 2021-07-21

## 2021-07-23 ENCOUNTER — NON-APPOINTMENT (OUTPATIENT)
Age: 81
End: 2021-07-23

## 2021-07-26 ENCOUNTER — APPOINTMENT (OUTPATIENT)
Dept: UROGYNECOLOGY | Facility: CLINIC | Age: 81
End: 2021-07-26
Payer: MEDICARE

## 2021-07-26 ENCOUNTER — NON-APPOINTMENT (OUTPATIENT)
Age: 81
End: 2021-07-26

## 2021-07-26 DIAGNOSIS — Z87.440 PERSONAL HISTORY OF URINARY (TRACT) INFECTIONS: ICD-10-CM

## 2021-07-26 LAB
BILIRUB UR QL STRIP: NEGATIVE
CLARITY UR: CLEAR
COLLECTION METHOD: NORMAL
GLUCOSE UR-MCNC: NEGATIVE
HCG UR QL: 0.2 EU/DL
HGB UR QL STRIP.AUTO: NEGATIVE
KETONES UR-MCNC: NEGATIVE
LEUKOCYTE ESTERASE UR QL STRIP: NEGATIVE
NITRITE UR QL STRIP: NEGATIVE
PH UR STRIP: 7
PROT UR STRIP-MCNC: NEGATIVE
SP GR UR STRIP: 1.01

## 2021-07-26 PROCEDURE — 99213 OFFICE O/P EST LOW 20 MIN: CPT

## 2021-07-26 PROCEDURE — 81003 URINALYSIS AUTO W/O SCOPE: CPT | Mod: QW

## 2021-07-28 LAB — BACTERIA UR CULT: NORMAL

## 2021-07-30 ENCOUNTER — NON-APPOINTMENT (OUTPATIENT)
Age: 81
End: 2021-07-30

## 2021-08-04 ENCOUNTER — APPOINTMENT (OUTPATIENT)
Dept: PULMONOLOGY | Facility: CLINIC | Age: 81
End: 2021-08-04

## 2021-08-04 NOTE — PROCEDURE
[FreeTextEntry1] : PFT 4/15/21\par Mild  borderline  moderate  OAD\par Lung volumes normal range\par DLCO  83 %\par HGGB 10.4\par NIOX  12  Nl range  4/15/21\par \par PFT 1/13/21\par mild  mod redcution flow rates\par Moderate OAD\par Normal lung volumes\par Normal DLCO 79 % pred noted mild decline pulmonary physiology\par \par X-ray PA lateral projection January 13, 2021\par Borderline cardiomegaly\par Right lateral scoliosis\par Lung fields otherwise clear without parenchymal infiltrate pleural effusion or dominant pulmonary nodules\par Soft tissue bony structures otherwise grossly unremarkable\par Impression clear lungs no gross evidence for rib fractures or infiltrates\par No interval change compared to chest x-ray July 15, 2020\par \par Chest x-ray PA lateral July 15, 2020\par Borderline cardiomegaly\par Hyperaeration\par Clear lungs\par No parenchymal infiltrate pleural effusions or dominant pulmonary nodules\par No interval change compared to chest x-ray of December 20, 2019\par \par PFT Body BOX July 10 2020\par mild OAD\par  No BD at FEV1\par Normal lung volumes\par  sp conductance and resistance normal\par Diffusion 80 % pred normal\par  HGB 13.0 \par \par Spirometry March 9, 2020\par Mild obstructive ventilatory impairment\par No bronchodilator response at FEV1\par Stable FEV1\par \par Chest x-ray PA lateral December 20, 2019\par Normal cardiac size\par Mild right lateral scoliosis\par Suggestion of some bronchiectatic changes at right lower lung zone\par This is unchanged compared to the recent chest x-ray of December 16, 2019\par No consolidation consistent with active pneumonia\par \par States Flu vaccination up-to-date\par History of pneumococcal vaccination and Prevnar administered\par \par PFT December 16, 2019\par Mild obstructive ventilatory impairment\par No response to bronchodilator at the FEV1\par Lung volumes are normal with total capacity 95% predicted.\par Diffusion relatively normal 78% of predicted.\par Hemoglobin 13.7\par \par Chest x-ray PA lateral December 16 2019\par Mild cardiomegaly\par Left lower lung zone minimal discoid linear atelectatic change\par No parenchymal infiltrates pleural effusions dominant pulmonary nodules\par Soft tissue bony structures grossly normal\par Impression no evidence of pneumonia\par \par Data review 12/21/2019\par Serum sodium 134 with serum chloride 93 normal renal function\par CBC White blood count 7.37 hemoglobin 14.3 hematocrit 43.4\par Procalcitonin negative\par RVP positive influenza A\par \par CT chest December 26, 2019\par Right mid lower lung zone tree-in-bud with centrilobular groundglass nodular opacities most consistent with impacted distal airways\par \par

## 2021-08-04 NOTE — CONSULT LETTER
[Dear  ___] : Dear  [unfilled], [Courtesy Letter:] : I had the pleasure of seeing your patient, [unfilled], in my office today. [Please see my note below.] : Please see my note below. [Sincerely,] : Sincerely, [FreeTextEntry3] : Bharat Arguelles D.O., JYOTI\par  of Medicine\par [facility] [employer]\par

## 2021-08-12 ENCOUNTER — APPOINTMENT (OUTPATIENT)
Dept: UROGYNECOLOGY | Facility: CLINIC | Age: 81
End: 2021-08-12
Payer: MEDICARE

## 2021-08-12 DIAGNOSIS — R68.83 CHILLS (WITHOUT FEVER): ICD-10-CM

## 2021-08-12 DIAGNOSIS — R39.9 UNSPECIFIED SYMPTOMS AND SIGNS INVOLVING THE GENITOURINARY SYSTEM: ICD-10-CM

## 2021-08-12 PROCEDURE — 99214 OFFICE O/P EST MOD 30 MIN: CPT | Mod: 25

## 2021-08-12 PROCEDURE — 51701 INSERT BLADDER CATHETER: CPT

## 2021-08-12 NOTE — DISCUSSION/SUMMARY
[FreeTextEntry1] : Office catheterized urine dip: Clear urine; Neg Nitrites, Leukocytes, Blood; Per pt's request d/t +frequency, chills (temp 99) and hx IC, SANDEEP's, Cystoscopy "w/ inflammed bladder walls" formal UA/CS Sent and will call pt when results available\par Consider Ppx antibiotics regimen pending this UCS result\par Empiric Rx Macrobid given to pt to start d/t +symptoms\par Advised increase PO fluids daily, con't probiotics, Vit D\par Con't Estrace cream PV BIW HS and topical Zinc Oxide ointment as barrier protection frequently daily\par Con't f/u with [facility] Oncologist, Dr. Ruffin\par x3 month f/u or sooner prn\par Instructed pt to call the office if any problems or concerns and she verbalized understanding.\par

## 2021-08-12 NOTE — PROCEDURE
[Straight Catheterization] : insertion of a straight catheter [Urinary Frequency] : urinary frequency [Other ___] : [unfilled] [Patient] : the patient [___ Fr Straight Tip] : a [unfilled] in [place] citizen straight tip catheter [None] : there were no complications with the catheter insertion [Clear] : clear [Culture] : culture [Urinalysis] : urinalysis [No Complications] : no complications [Tolerated Well] : the patient tolerated the procedure well [Post procedure instructions and information given] : Post procedure instructions and information were given and reviewed with patient. [de-identified] : Clear urine; Neg Nitrites, Leukocytes, Blood

## 2021-08-12 NOTE — HISTORY OF PRESENT ILLNESS
[FreeTextEntry1] : Pt w/ known hx IC, SANDEEP's, VVA s/p +e. coli UTI 7/9/21 treated with Macrobid w/ relief and requested MEGHAN 7/26/21 which was Neg for UTI, presents to office today c/o 3 days ago w/ onset of frequency, and yesterday w/ pink tinged urine and Chills (temp 99) last night. Pt did not take OTC AZO or Pyridium d/t fear of discoloring urine for office urine dip test again. Pt has been applying Estrace cream topically to introitus TIW HS. Pt takes Probiotics and Vit D daily. Pt states she is on Prednisone pills for x2 weeks for poison ivy exposure and rash/itching b/l arms and legs.

## 2021-08-13 LAB
APPEARANCE: CLEAR
BACTERIA: NEGATIVE
BILIRUBIN URINE: NEGATIVE
BLOOD URINE: NEGATIVE
COLOR: NORMAL
GLUCOSE QUALITATIVE U: NEGATIVE
HYALINE CASTS: 0 /LPF
KETONES URINE: NEGATIVE
LEUKOCYTE ESTERASE URINE: NEGATIVE
MICROSCOPIC-UA: NORMAL
NITRITE URINE: NEGATIVE
PH URINE: 7
PROTEIN URINE: NEGATIVE
RED BLOOD CELLS URINE: 1 /HPF
SPECIFIC GRAVITY URINE: 1.01
SQUAMOUS EPITHELIAL CELLS: 0 /HPF
UROBILINOGEN URINE: NORMAL
WHITE BLOOD CELLS URINE: 0 /HPF

## 2021-08-17 ENCOUNTER — TRANSCRIPTION ENCOUNTER (OUTPATIENT)
Age: 81
End: 2021-08-17

## 2021-08-27 ENCOUNTER — APPOINTMENT (OUTPATIENT)
Dept: UROGYNECOLOGY | Facility: CLINIC | Age: 81
End: 2021-08-27
Payer: MEDICARE

## 2021-08-27 ENCOUNTER — RESULT CHARGE (OUTPATIENT)
Age: 81
End: 2021-08-27

## 2021-08-27 ENCOUNTER — RESULT REVIEW (OUTPATIENT)
Age: 81
End: 2021-08-27

## 2021-08-27 VITALS — TEMPERATURE: 97.1 F

## 2021-08-27 DIAGNOSIS — R39.15 URGENCY OF URINATION: ICD-10-CM

## 2021-08-27 LAB
BILIRUB UR QL STRIP: NORMAL
CLARITY UR: NORMAL
COLLECTION METHOD: NORMAL
GLUCOSE UR-MCNC: NORMAL
HCG UR QL: 0.2 EU/DL
HGB UR QL STRIP.AUTO: NORMAL
KETONES UR-MCNC: NORMAL
LEUKOCYTE ESTERASE UR QL STRIP: NORMAL
NITRITE UR QL STRIP: NORMAL
PH UR STRIP: 6
PROT UR STRIP-MCNC: NORMAL
SP GR UR STRIP: 1.02

## 2021-08-27 PROCEDURE — 81003 URINALYSIS AUTO W/O SCOPE: CPT | Mod: QW

## 2021-08-27 PROCEDURE — 99213 OFFICE O/P EST LOW 20 MIN: CPT

## 2021-08-27 NOTE — PHYSICAL EXAM
[No Acute Distress] : in no acute distress [Well developed] : well developed [Well Nourished] : ~L well nourished [Good Hygeine] : demonstrates good hygeine [Oriented x3] : oriented to person, place, and time [Normal Memory] : ~T memory was ~L unimpaired [Normal Mood/Affect] : mood and affect are normal [None] : no CVA tenderness [Vulvar Atrophy] : vulvar atrophy [Labia Majora] : were normal [Labia Minora] : were normal [Normal] : was normal [Atrophy] : atrophy [Tenderness] : ~T no ~M abdominal tenderness observed [Distended] : not distended

## 2021-08-27 NOTE — HISTORY OF PRESENT ILLNESS
[FreeTextEntry1] :  {atient with hx of SANDEEP recently tx on 8/12/21 for +UCS Enterococcus Faecalis with Macrobid x 5 days presents to office with complaints of symptoms of new urinary urgency and dysuria. States after completing course of Macrobid her symptoms felt better but symptoms restarted a "few days" ago. Denies fever, chills, back pain or blood in urine. She is using the vaginal estrogen as prescribed. She states she has an apt to establish care with Dr. Perry in October.

## 2021-08-27 NOTE — DISCUSSION/SUMMARY
[FreeTextEntry1] : 1. SANDEEP, Frequency, Urgency\par - POCT Udip positive small elvira; formal UA/CS sent to lab\par - Due to patient symptoms will tx empirically with Keflex 500 mg BID x 7 days. Advised patient to take medication with food and drink fluids.\par - Advised to take Probiotics\par - Continue use of Vaginal estrogen BIW as prescribed\par - Script given to have Renal/Bladder US completed to evaluate Recurrent UTI\par - Based on formal Results will consider PPX abx, will Discuss with Dr. Perry as patient is going to be following up with her\par \par Will RTO for initial follow up with Dr. Perry or sooner if needed. Patient agrees to call office with any questions or concerns, verbalized understanding. \par

## 2021-08-31 ENCOUNTER — APPOINTMENT (OUTPATIENT)
Dept: ULTRASOUND IMAGING | Facility: CLINIC | Age: 81
End: 2021-08-31
Payer: MEDICARE

## 2021-08-31 ENCOUNTER — OUTPATIENT (OUTPATIENT)
Dept: OUTPATIENT SERVICES | Facility: HOSPITAL | Age: 81
LOS: 1 days | End: 2021-08-31
Payer: MEDICARE

## 2021-08-31 DIAGNOSIS — N39.0 URINARY TRACT INFECTION, SITE NOT SPECIFIED: ICD-10-CM

## 2021-08-31 PROCEDURE — 76770 US EXAM ABDO BACK WALL COMP: CPT | Mod: 26

## 2021-08-31 PROCEDURE — 76770 US EXAM ABDO BACK WALL COMP: CPT

## 2021-09-03 ENCOUNTER — NON-APPOINTMENT (OUTPATIENT)
Age: 81
End: 2021-09-03

## 2021-09-07 LAB
APPEARANCE: ABNORMAL
BACTERIA: NEGATIVE
BILIRUBIN URINE: NEGATIVE
BLOOD URINE: ABNORMAL
COLOR: YELLOW
GLUCOSE QUALITATIVE U: NEGATIVE
HYALINE CASTS: 1 /LPF
KETONES URINE: NEGATIVE
LEUKOCYTE ESTERASE URINE: ABNORMAL
MICROSCOPIC-UA: NORMAL
NITRITE URINE: NEGATIVE
PH URINE: 6.5
PROTEIN URINE: NORMAL
RED BLOOD CELLS URINE: 20 /HPF
SPECIFIC GRAVITY URINE: 1.02
SQUAMOUS EPITHELIAL CELLS: 0 /HPF
UROBILINOGEN URINE: NORMAL
WHITE BLOOD CELLS URINE: 486 /HPF

## 2021-09-09 ENCOUNTER — NON-APPOINTMENT (OUTPATIENT)
Age: 81
End: 2021-09-09

## 2021-10-04 ENCOUNTER — APPOINTMENT (OUTPATIENT)
Dept: UROGYNECOLOGY | Facility: CLINIC | Age: 81
End: 2021-10-04
Payer: MEDICARE

## 2021-10-05 ENCOUNTER — NON-APPOINTMENT (OUTPATIENT)
Age: 81
End: 2021-10-05

## 2021-10-12 DIAGNOSIS — Z01.812 ENCOUNTER FOR PREPROCEDURAL LABORATORY EXAMINATION: ICD-10-CM

## 2021-10-18 LAB — SARS-COV-2 N GENE NPH QL NAA+PROBE: NOT DETECTED

## 2021-10-20 ENCOUNTER — APPOINTMENT (OUTPATIENT)
Dept: PULMONOLOGY | Facility: CLINIC | Age: 81
End: 2021-10-20
Payer: MEDICARE

## 2021-10-20 VITALS
OXYGEN SATURATION: 93 % | TEMPERATURE: 98.3 F | DIASTOLIC BLOOD PRESSURE: 74 MMHG | SYSTOLIC BLOOD PRESSURE: 131 MMHG | HEART RATE: 64 BPM

## 2021-10-20 LAB — POCT - HEMOGLOBIN (HGB), QUANTITATIVE, TRANSCUTANEOUS: 11.5

## 2021-10-20 PROCEDURE — 94010 BREATHING CAPACITY TEST: CPT

## 2021-10-20 PROCEDURE — ZZZZZ: CPT

## 2021-10-20 PROCEDURE — 94727 GAS DIL/WSHOT DETER LNG VOL: CPT

## 2021-10-20 PROCEDURE — 99214 OFFICE O/P EST MOD 30 MIN: CPT | Mod: 25

## 2021-10-20 PROCEDURE — 88738 HGB QUANT TRANSCUTANEOUS: CPT

## 2021-10-20 PROCEDURE — 94729 DIFFUSING CAPACITY: CPT

## 2021-10-20 NOTE — DISCUSSION/SUMMARY
[FreeTextEntry1] : \par COVID AB Negative\par "Impaction distal bronchi\par Obstructive airway disease\par Mild decline pulmonary physiology- restart Trelegy Elipita- Continue\par Other history as noted above\par Vaccination and pneumococcal profile up to date\par Advised if needs to use should notify us for evaluation\par [facility] cancer screening\par F/U Héctor Frost Colonoscopy up to  date\par Spinal disease  with possible epidural vs surgical procedure\par From pulmonary perspective no  absolute  contraindication to undergo spinal surgery\par PFIZER  completed\par recommended booster \par Flu  vaccine up to date

## 2021-10-20 NOTE — PROCEDURE
[FreeTextEntry1] : PFT 10/20/21\par Flow rates nl\par  minimal OAD ratio 78\par Lung Volumes nl\par  DLCO  78 %\par HGB 11.5\par \par PFT 4/15/21\par Mild  borderline  moderate  OAD\par Lung volumes normal range\par DLCO  83 %\par HGGB 10.4\par NIOX  12  Nl range  4/15/21\par \par PFT 1/13/21\par mild  mod reduction flow rates\par Moderate OAD\par Normal lung volumes\par Normal DLCO 79 % pred noted mild decline pulmonary physiology\par \par X-ray PA lateral projection January 13, 2021\par Borderline cardiomegaly\par Right lateral scoliosis\par Lung fields otherwise clear without parenchymal infiltrate pleural effusion or dominant pulmonary nodules\par Soft tissue bony structures otherwise grossly unremarkable\par Impression clear lungs no gross evidence for rib fractures or infiltrates\par No interval change compared to chest x-ray July 15, 2020\par \par Chest x-ray PA lateral July 15, 2020\par Borderline cardiomegaly\par Hyperaeration\par Clear lungs\par No parenchymal infiltrate pleural effusions or dominant pulmonary nodules\par No interval change compared to chest x-ray of December 20, 2019\par \par PFT Body BOX July 10 2020\par mild OAD\par  No BD at FEV1\par Normal lung volumes\par  sp conductance and resistance normal\par Diffusion 80 % pred normal\par  HGB 13.0 \par \par Spirometry March 9, 2020\par Mild obstructive ventilatory impairment\par No bronchodilator response at FEV1\par Stable FEV1\par \par Chest x-ray PA lateral December 20, 2019\par Normal cardiac size\par Mild right lateral scoliosis\par Suggestion of some bronchiectatic changes at right lower lung zone\par This is unchanged compared to the recent chest x-ray of December 16, 2019\par No consolidation consistent with active pneumonia\par \par States Flu vaccination up-to-date\par History of pneumococcal vaccination and Prevnar administered\par \par PFT December 16, 2019\par Mild obstructive ventilatory impairment\par No response to bronchodilator at the FEV1\par Lung volumes are normal with total capacity 95% predicted.\par Diffusion relatively normal 78% of predicted.\par Hemoglobin 13.7\par \par Chest x-ray PA lateral December 16 2019\par Mild cardiomegaly\par Left lower lung zone minimal discoid linear atelectatic change\par No parenchymal infiltrates pleural effusions dominant pulmonary nodules\par Soft tissue bony structures grossly normal\par Impression no evidence of pneumonia\par \par Data review 12/21/2019\par Serum sodium 134 with serum chloride 93 normal renal function\par CBC White blood count 7.37 hemoglobin 14.3 hematocrit 43.4\par Procalcitonin negative\par RVP positive influenza A\par \par CT chest December 26, 2019\par Right mid lower lung zone tree-in-bud with centrilobular groundglass nodular opacities most consistent with impacted distal airways\par \par Blood draw

## 2021-10-20 NOTE — HISTORY OF PRESENT ILLNESS
[Never] : never [Stable] : are stable [None] : ~He/She~ has no significant interval events [Difficulty Breathing During Exertion] : denies dyspnea on exertion [Feelings Of Weakness On Exertion] : denies exercise intolerance [Cough] : denies coughing [Wheezing] : denies wheezing [Regional Soft Tissue Swelling Both Lower Extremities] : denies lower extremity edema [Chest Pain Or Discomfort] : denies chest pain [Fever] : denies fever [TextBox_4] : Overall feeling well. Denies cough/wheeze/SOB. \par  GI noted\par completed PFIZER   [Wt Gain ___ Lbs] : no recent weight gain [Wt Loss ___ Lbs] : no recent weight loss [Oxygen] : the patient uses no supplemental oxygen

## 2021-10-20 NOTE — CONSULT LETTER
[Dear  ___] : Dear  [unfilled], [Courtesy Letter:] : I had the pleasure of seeing your patient, [unfilled], in my office today. [Please see my note below.] : Please see my note below. [Sincerely,] : Sincerely, [FreeTextEntry3] : Bharat Arguelles D.O., JYOTI\par  of Medicine\par [employer] [employer]\par

## 2021-11-15 ENCOUNTER — APPOINTMENT (OUTPATIENT)
Dept: UROGYNECOLOGY | Facility: CLINIC | Age: 81
End: 2021-11-15
Payer: MEDICARE

## 2021-11-15 VITALS
BODY MASS INDEX: 22.53 KG/M2 | HEIGHT: 64 IN | TEMPERATURE: 97.8 F | DIASTOLIC BLOOD PRESSURE: 80 MMHG | SYSTOLIC BLOOD PRESSURE: 128 MMHG | WEIGHT: 132 LBS

## 2021-11-15 DIAGNOSIS — N95.2 POSTMENOPAUSAL ATROPHIC VAGINITIS: ICD-10-CM

## 2021-11-15 PROCEDURE — 99214 OFFICE O/P EST MOD 30 MIN: CPT

## 2021-11-15 NOTE — PHYSICAL EXAM
[No Acute Distress] : in no acute distress [Well developed] : well developed [Well Nourished] : ~L well nourished [Good Hygeine] : demonstrates good hygeine [Oriented x3] : oriented to person, place, and time [Normal Memory] : ~T memory was ~L unimpaired [Normal Mood/Affect] : mood and affect are normal [Vulvar Atrophy] : vulvar atrophy [Labia Majora] : were normal [Labia Minora] : were normal [Atrophy] : atrophy [Dry Mucosa] : dry mucosa [No Bleeding] : there was no active vaginal bleeding [Absent] : absent [Normal] : no abnormalities [Exam Deferred] : was deferred [Tenderness] : ~T no ~M abdominal tenderness observed [Distended] : not distended

## 2021-11-15 NOTE — DISCUSSION/SUMMARY
[FreeTextEntry1] : \par rUTIs, atrophy:\par -We reviewed methods of prophylaxis extensively. \par -Continue daily ppx with Nitrofurantoin 50 mg QHS x 6 months\par -Discontinue vaginal estradiol tablets tiw and change to cream biw, instructions provided. r/b/a reviewed. \par -Daily Probiotic\par -Daily Cranberry tablets \par -If UTI symptoms, try Cystex OTC first. If symptoms persist, must obtain urine culture prior to starting treatment\par -If continues to get UTIs despite ppx, will obtain further workup with renal sonogram and cystoscopy \par \par RTO in 6 mo for follow up, or sooner if issues arise. \par \par The following treatment plan was designed for this patient and provided to her in written form and reviewed extensively. Patient was given a copy to take home: \par To Prevent UTIs:\par 1.	Nitrofurantoin 50 mg at bedtime daily x 6 months\par 2.	 Cranberry Tablets daily\par 3.	 Probiotic Daily (ex Culturelle)\par 4.	 Vaginal estrogen- use twice weekly at bedtime. Instructions below\par \par Fill applicator with the cream to the 1 gram line. While lying in bed, gently insert the pre-filled applicator with estrogen cream into the vagina ~2 insides inside the vagina. Hit the button to release the cream into the vagina. Let the cream sit inside the vagina overnight where it will absorb.\par \par Directions: Use twice weekly at bedtime (Mon/Thursday)\par \par

## 2021-11-15 NOTE — HISTORY OF PRESENT ILLNESS
[FreeTextEntry1] : \jeet Cheng presents for consultation. She was previously seen by Dr Reed and Faiza for An. She has had several culture proven UTIs in 2021, results reviewed extensively. For ppx she is using low dose vaginal estrogen and taking Nitrofurantoin 50 mg QHS, which was started in October 2021. She denies UTI symptoms today. She reports feeling so dry vaginally that she increased dosing of vaginal estradiol tablets to 10mcg tiw instead of biw. She denies side effects.

## 2022-03-14 ENCOUNTER — NON-APPOINTMENT (OUTPATIENT)
Age: 82
End: 2022-03-14

## 2022-03-14 ENCOUNTER — EMERGENCY (EMERGENCY)
Facility: HOSPITAL | Age: 82
LOS: 1 days | Discharge: HOME | End: 2022-03-14
Attending: EMERGENCY MEDICINE | Admitting: EMERGENCY MEDICINE
Payer: MEDICARE

## 2022-03-14 VITALS
RESPIRATION RATE: 18 BRPM | OXYGEN SATURATION: 98 % | DIASTOLIC BLOOD PRESSURE: 105 MMHG | TEMPERATURE: 98 F | SYSTOLIC BLOOD PRESSURE: 193 MMHG | HEART RATE: 72 BPM | WEIGHT: 136.91 LBS | HEIGHT: 64 IN

## 2022-03-14 VITALS
OXYGEN SATURATION: 99 % | RESPIRATION RATE: 20 BRPM | DIASTOLIC BLOOD PRESSURE: 91 MMHG | SYSTOLIC BLOOD PRESSURE: 152 MMHG | HEART RATE: 75 BPM | TEMPERATURE: 98 F

## 2022-03-14 PROCEDURE — 72100 X-RAY EXAM L-S SPINE 2/3 VWS: CPT | Mod: 26

## 2022-03-14 PROCEDURE — 73502 X-RAY EXAM HIP UNI 2-3 VIEWS: CPT | Mod: 26,LT

## 2022-03-14 PROCEDURE — 99283 EMERGENCY DEPT VISIT LOW MDM: CPT

## 2022-03-14 RX ORDER — IBUPROFEN 200 MG
600 TABLET ORAL ONCE
Refills: 0 | Status: COMPLETED | OUTPATIENT
Start: 2022-03-14 | End: 2022-03-14

## 2022-03-14 RX ORDER — LIDOCAINE 4 G/100G
1 CREAM TOPICAL ONCE
Refills: 0 | Status: COMPLETED | OUTPATIENT
Start: 2022-03-14 | End: 2022-03-14

## 2022-03-14 RX ADMIN — LIDOCAINE 1 PATCH: 4 CREAM TOPICAL at 16:16

## 2022-03-14 RX ADMIN — Medication 600 MILLIGRAM(S): at 16:16

## 2022-03-14 NOTE — ED PROVIDER NOTE - OBJECTIVE STATEMENT
82 y/o male with a PMHx of Hashimoto's thyroiditis, HTN, interstitial cystitis, Mihai's syndrome, osteopenia, spinal stenosis s/p laminectomy, torn meniscus s/p repair brought in by EMS for evaluation of worsening lower back and left buttocks pain s/p fall on 03/11/2022. Pt reports she tripped & fell at home after she hit her foot against something in the house falling directly onto her left hip. States pain is worse with walking. Pt has been going to physical therapy and pool therapy for her back and states this is an exacerbation of her chronic back pain. Pt has been taking gabapentin, muscle relaxers, and Tylenol for pain with minimal relief. Denies hip pian, LOC, head injury, and any other injury or symptoms. No other complaints at this time.   Neurologist: Dr. Baljeet Benson; Neurosurgeon: Dr. Renato Ware

## 2022-03-14 NOTE — ED PROVIDER NOTE - CLINICAL SUMMARY MEDICAL DECISION MAKING FREE TEXT BOX
Pt fell 3 days ago with exacerbation of lower back pain. Plan: XR Lumbar spine and left hip. If negative, rx pain medications and refer back to orthopedic surgery for follow up.

## 2022-03-14 NOTE — ED PROVIDER NOTE - NSICDXPASTMEDICALHX_GEN_ALL_CORE_FT
PAST MEDICAL HISTORY:  Hashimoto's thyroiditis     Hypertension     Interstitial cystitis     Cortes syndrome     Osteopenia     Spinal stenosis     Torn meniscus        No

## 2022-03-14 NOTE — ED PROVIDER NOTE - CARE PROVIDER_API CALL
Shabbir Mao (MD)  Orthopaedic Surgery  [address], [address]  [place] [address]  Phone: [phone]  Fax: [phone]  Follow Up Time: 1-3 Days

## 2022-03-14 NOTE — ED ADULT NURSE NOTE - NSICDXPASTMEDICALHX_GEN_ALL_CORE_FT
PAST MEDICAL HISTORY:  Hashimoto's thyroiditis     Hypertension     Interstitial cystitis     Cortes syndrome     Osteopenia     Spinal stenosis     Torn meniscus

## 2022-03-14 NOTE — ED PROVIDER NOTE - PATIENT PORTAL LINK FT
You can access the FollowMyHealth Patient Portal offered by [facility] by registering at the following website: [email or web address]. By joining [facility]’s FollowMyHealth portal, you will also be able to view your health information using other applications (apps) compatible with our system.

## 2022-03-16 ENCOUNTER — INPATIENT (INPATIENT)
Facility: HOSPITAL | Age: 82
LOS: 4 days | Discharge: NURSING, REHAB OR LONG-TERM CARE | DRG: 914 | End: 2022-03-21
Attending: STUDENT IN AN ORGANIZED HEALTH CARE EDUCATION/TRAINING PROGRAM | Admitting: STUDENT IN AN ORGANIZED HEALTH CARE EDUCATION/TRAINING PROGRAM
Payer: MEDICARE

## 2022-03-16 VITALS
OXYGEN SATURATION: 100 % | HEIGHT: 64 IN | HEART RATE: 65 BPM | SYSTOLIC BLOOD PRESSURE: 135 MMHG | RESPIRATION RATE: 14 BRPM | WEIGHT: 136.91 LBS | DIASTOLIC BLOOD PRESSURE: 64 MMHG | TEMPERATURE: 98 F

## 2022-03-16 DIAGNOSIS — E87.1 HYPO-OSMOLALITY AND HYPONATREMIA: ICD-10-CM

## 2022-03-16 DIAGNOSIS — N39.0 URINARY TRACT INFECTION, SITE NOT SPECIFIED: ICD-10-CM

## 2022-03-16 DIAGNOSIS — E78.5 HYPERLIPIDEMIA, UNSPECIFIED: ICD-10-CM

## 2022-03-16 DIAGNOSIS — I10 ESSENTIAL (PRIMARY) HYPERTENSION: ICD-10-CM

## 2022-03-16 DIAGNOSIS — Z29.9 ENCOUNTER FOR PROPHYLACTIC MEASURES, UNSPECIFIED: ICD-10-CM

## 2022-03-16 DIAGNOSIS — M54.59 OTHER LOW BACK PAIN: ICD-10-CM

## 2022-03-16 LAB
ALBUMIN SERPL ELPH-MCNC: 3.4 G/DL — SIGNIFICANT CHANGE UP (ref 3.3–5)
ALP SERPL-CCNC: 74 U/L — SIGNIFICANT CHANGE UP (ref 30–120)
ALT FLD-CCNC: 28 U/L DA — SIGNIFICANT CHANGE UP (ref 10–60)
ANION GAP SERPL CALC-SCNC: 6 MMOL/L — SIGNIFICANT CHANGE UP (ref 5–17)
APTT BLD: 26.9 SEC — LOW (ref 27.5–35.5)
AST SERPL-CCNC: 21 U/L — SIGNIFICANT CHANGE UP (ref 10–40)
BASOPHILS # BLD AUTO: 0 K/UL — SIGNIFICANT CHANGE UP (ref 0–0.2)
BASOPHILS NFR BLD AUTO: 0 % — SIGNIFICANT CHANGE UP (ref 0–2)
BILIRUB SERPL-MCNC: 0.3 MG/DL — SIGNIFICANT CHANGE UP (ref 0.2–1.2)
BUN SERPL-MCNC: 13 MG/DL — SIGNIFICANT CHANGE UP (ref 7–23)
CALCIUM SERPL-MCNC: 8.6 MG/DL — SIGNIFICANT CHANGE UP (ref 8.4–10.5)
CHLORIDE SERPL-SCNC: 89 MMOL/L — LOW (ref 96–108)
CO2 SERPL-SCNC: 27 MMOL/L — SIGNIFICANT CHANGE UP (ref 22–31)
CREAT SERPL-MCNC: 0.53 MG/DL — SIGNIFICANT CHANGE UP (ref 0.5–1.3)
EGFR: 93 ML/MIN/1.73M2 — SIGNIFICANT CHANGE UP
EOSINOPHIL # BLD AUTO: 0.1 K/UL — SIGNIFICANT CHANGE UP (ref 0–0.5)
EOSINOPHIL NFR BLD AUTO: 2 % — SIGNIFICANT CHANGE UP (ref 0–6)
GLUCOSE SERPL-MCNC: 94 MG/DL — SIGNIFICANT CHANGE UP (ref 70–99)
HCT VFR BLD CALC: 35.5 % — SIGNIFICANT CHANGE UP (ref 34.5–45)
HGB BLD-MCNC: 12.3 G/DL — SIGNIFICANT CHANGE UP (ref 11.5–15.5)
INR BLD: 1.02 RATIO — SIGNIFICANT CHANGE UP (ref 0.88–1.16)
LYMPHOCYTES # BLD AUTO: 1.12 K/UL — SIGNIFICANT CHANGE UP (ref 1–3.3)
LYMPHOCYTES # BLD AUTO: 23 % — SIGNIFICANT CHANGE UP (ref 13–44)
MANUAL SMEAR VERIFICATION: SIGNIFICANT CHANGE UP
MCHC RBC-ENTMCNC: 30.1 PG — SIGNIFICANT CHANGE UP (ref 27–34)
MCHC RBC-ENTMCNC: 34.6 GM/DL — SIGNIFICANT CHANGE UP (ref 32–36)
MCV RBC AUTO: 87 FL — SIGNIFICANT CHANGE UP (ref 80–100)
MONOCYTES # BLD AUTO: 0.83 K/UL — SIGNIFICANT CHANGE UP (ref 0–0.9)
MONOCYTES NFR BLD AUTO: 17 % — HIGH (ref 2–14)
NEUTROPHILS # BLD AUTO: 2.79 K/UL — SIGNIFICANT CHANGE UP (ref 1.8–7.4)
NEUTROPHILS NFR BLD AUTO: 55 % — SIGNIFICANT CHANGE UP (ref 43–77)
NEUTS BAND # BLD: 2 % — SIGNIFICANT CHANGE UP (ref 0–8)
NRBC # BLD: 0 — SIGNIFICANT CHANGE UP
NRBC # BLD: SIGNIFICANT CHANGE UP /100 WBCS (ref 0–0)
PLAT MORPH BLD: NORMAL — SIGNIFICANT CHANGE UP
PLATELET # BLD AUTO: 164 K/UL — SIGNIFICANT CHANGE UP (ref 150–400)
PLATELET CLUMP BLD QL SMEAR: SLIGHT
POTASSIUM SERPL-MCNC: 3.5 MMOL/L — SIGNIFICANT CHANGE UP (ref 3.5–5.3)
POTASSIUM SERPL-SCNC: 3.5 MMOL/L — SIGNIFICANT CHANGE UP (ref 3.5–5.3)
PROT SERPL-MCNC: 6.1 G/DL — SIGNIFICANT CHANGE UP (ref 6–8.3)
PROTHROM AB SERPL-ACNC: 11.7 SEC — SIGNIFICANT CHANGE UP (ref 10.5–13.4)
RBC # BLD: 4.08 M/UL — SIGNIFICANT CHANGE UP (ref 3.8–5.2)
RBC # FLD: 12.2 % — SIGNIFICANT CHANGE UP (ref 10.3–14.5)
RBC BLD AUTO: NORMAL — SIGNIFICANT CHANGE UP
SARS-COV-2 RNA SPEC QL NAA+PROBE: SIGNIFICANT CHANGE UP
SODIUM SERPL-SCNC: 122 MMOL/L — LOW (ref 135–145)
VARIANT LYMPHS # BLD: 1 % — SIGNIFICANT CHANGE UP (ref 0–6)
WBC # BLD: 4.89 K/UL — SIGNIFICANT CHANGE UP (ref 3.8–10.5)
WBC # FLD AUTO: 4.89 K/UL — SIGNIFICANT CHANGE UP (ref 3.8–10.5)

## 2022-03-16 PROCEDURE — 72125 CT NECK SPINE W/O DYE: CPT | Mod: 26,MA

## 2022-03-16 PROCEDURE — 93010 ELECTROCARDIOGRAM REPORT: CPT

## 2022-03-16 PROCEDURE — 99285 EMERGENCY DEPT VISIT HI MDM: CPT

## 2022-03-16 PROCEDURE — 72131 CT LUMBAR SPINE W/O DYE: CPT | Mod: 26,MA

## 2022-03-16 PROCEDURE — 70450 CT HEAD/BRAIN W/O DYE: CPT | Mod: 26,MA

## 2022-03-16 PROCEDURE — 72192 CT PELVIS W/O DYE: CPT | Mod: 26,MA

## 2022-03-16 PROCEDURE — 99223 1ST HOSP IP/OBS HIGH 75: CPT

## 2022-03-16 RX ORDER — SODIUM CHLORIDE 9 MG/ML
1000 INJECTION INTRAMUSCULAR; INTRAVENOUS; SUBCUTANEOUS ONCE
Refills: 0 | Status: COMPLETED | OUTPATIENT
Start: 2022-03-16 | End: 2022-03-16

## 2022-03-16 RX ORDER — ACETAMINOPHEN 500 MG
1000 TABLET ORAL ONCE
Refills: 0 | Status: COMPLETED | OUTPATIENT
Start: 2022-03-16 | End: 2022-03-16

## 2022-03-16 RX ORDER — ENOXAPARIN SODIUM 100 MG/ML
40 INJECTION SUBCUTANEOUS EVERY 24 HOURS
Refills: 0 | Status: DISCONTINUED | OUTPATIENT
Start: 2022-03-16 | End: 2022-03-21

## 2022-03-16 RX ORDER — SODIUM CHLORIDE 9 MG/ML
1000 INJECTION INTRAMUSCULAR; INTRAVENOUS; SUBCUTANEOUS
Refills: 0 | Status: ACTIVE | OUTPATIENT
Start: 2022-03-16 | End: 2023-02-12

## 2022-03-16 RX ORDER — ATORVASTATIN CALCIUM 80 MG/1
40 TABLET, FILM COATED ORAL AT BEDTIME
Refills: 0 | Status: DISCONTINUED | OUTPATIENT
Start: 2022-03-16 | End: 2022-03-21

## 2022-03-16 RX ORDER — LISINOPRIL 2.5 MG/1
20 TABLET ORAL DAILY
Refills: 0 | Status: DISCONTINUED | OUTPATIENT
Start: 2022-03-16 | End: 2022-03-20

## 2022-03-16 RX ORDER — HYDROCHLOROTHIAZIDE 25 MG
25 TABLET ORAL DAILY
Refills: 0 | Status: DISCONTINUED | OUTPATIENT
Start: 2022-03-16 | End: 2022-03-16

## 2022-03-16 RX ORDER — METOPROLOL TARTRATE 50 MG
25 TABLET ORAL
Refills: 0 | Status: DISCONTINUED | OUTPATIENT
Start: 2022-03-16 | End: 2022-03-21

## 2022-03-16 RX ORDER — GABAPENTIN 400 MG/1
300 CAPSULE ORAL
Refills: 0 | Status: DISCONTINUED | OUTPATIENT
Start: 2022-03-16 | End: 2022-03-19

## 2022-03-16 RX ORDER — KETOROLAC TROMETHAMINE 30 MG/ML
15 SYRINGE (ML) INJECTION EVERY 6 HOURS
Refills: 0 | Status: DISCONTINUED | OUTPATIENT
Start: 2022-03-16 | End: 2022-03-17

## 2022-03-16 RX ADMIN — SODIUM CHLORIDE 1000 MILLILITER(S): 9 INJECTION INTRAMUSCULAR; INTRAVENOUS; SUBCUTANEOUS at 18:05

## 2022-03-16 RX ADMIN — Medication 15 MILLIGRAM(S): at 22:47

## 2022-03-16 RX ADMIN — GABAPENTIN 300 MILLIGRAM(S): 400 CAPSULE ORAL at 23:41

## 2022-03-16 RX ADMIN — Medication 1000 MILLIGRAM(S): at 15:10

## 2022-03-16 RX ADMIN — Medication 25 MILLIGRAM(S): at 23:37

## 2022-03-16 RX ADMIN — Medication 400 MILLIGRAM(S): at 14:50

## 2022-03-16 RX ADMIN — ENOXAPARIN SODIUM 40 MILLIGRAM(S): 100 INJECTION SUBCUTANEOUS at 22:47

## 2022-03-16 NOTE — H&P ADULT - PROBLEM SELECTOR PLAN 6
Asthma  Denies recent hospitalizations for asthma  DM (diabetes mellitus)  Not on any medications  HTN (hypertension)    Localized swelling, mass and lump, head    Sleep apnea    Tonsil cancer
started her on LMWH 40 mg of Lovenox sub Q daily for DVT prophylaxis.

## 2022-03-16 NOTE — H&P ADULT - ASSESSMENT
80 y/o F with PMH of HTN, Dyslipidemia, CAD s/p PCI, COPD, Hashimoto's Thyroiditis, Recurrent UTIs on suppressive therapy, and Spinal Stenosis s/p Laminectomy with Chronic Back Pain presented with intractable lower back pain

## 2022-03-16 NOTE — H&P ADULT - PROBLEM SELECTOR PLAN 1
chronic pain worsened by falls, imaging showed no acute findings, unable to ambulate with intractable pain, admitted to medicine, multimodal pain control, patient allergic to Morphine & doesn't tolerate codeine, avoid opioids for now, started her on PRN IVP Toradol, and Gabapentin, also RN Acetaminophen IVPB, PT evaluation in am, consider pain management & ortho consult in am.

## 2022-03-16 NOTE — ED PROVIDER NOTE - OBJECTIVE STATEMENT
80 y/o F w/ PMHx of Hashimoto's thyroiditis, HTN, interstitial cystitis, Mihai's syndrome, osteopenia, spinal stenosis s/p laminectomy, torn meniscus s/p repair presents to ED c/o low back pain s/p fall last night. Pt relates last night she lost her footing and fell onto her back and was unable to get off the floor. Pt relates she called 911 who assisted her to bed but she did not seek medical attention at that time. Pt reports she had fall on 3/11 injuring hip and was seen in this ED on 3/14 had XR which was negative and was d/c home. Pt relates since fall she has been ambulating w/ cane 2/2 pain. Denies head injury, LOC, weakness, numbness, headache, dizziness, n/v, chest pain, SOB, bladder or bowel incontinence. Pt endorses taking ASA but denies other blood thinner use. PCP: Dr. Gooden 80 y/o F w/ PMHx of Hashimoto's thyroiditis, HTN, interstitial cystitis, Mihai's syndrome, osteopenia, spinal stenosis s/p laminectomy, torn meniscus s/p repair presents to ED c/o low back pain s/p fall last night. Pt relates last night she lost her footing and fell onto her back and was unable to get off the floor. Pt relates she called 911 who assisted her to bed but she did not seek medical attention at that time. Pt reports she had fall on 3/11 injuring hip and was seen in this ED on 3/14 had XR which was negative and was d/c home. Pt relates since fall she has been ambulating w/ walker 2/2 pain. Denies head injury, LOC, weakness, numbness, headache, dizziness, n/v, chest pain, SOB, bladder or bowel incontinence. Pt endorses taking ASA but denies other blood thinner use. PCP: Dr. Gooden

## 2022-03-16 NOTE — H&P ADULT - NSHPLABSRESULTS_GEN_ALL_CORE
-                         12.3   4.89  )-----------( 164      ( 16 Mar 2022 15:16 )             35.5            03-16    122<L>  |  89<L>  |  13  ----------------------------<  94  3.5   |  27  |  0.53    Ca    8.6      16 Mar 2022 15:16    TPro  6.1  /  Alb  3.4  /  TBili  0.3  /  DBili  x   /  AST  21  /  ALT  28  /  AlkPhos  74  03-16            PT/INR - ( 16 Mar 2022 15:16 )   PT: 11.7 sec;   INR: 1.02 ratio    PTT - ( 16 Mar 2022 15:16 )  PTT:26.9 sec      COVID-19 PCR: NotDetec (16 Mar 2022 17:35)       CT PELVIS BONY ONLY                        PROCEDURE DATE:  03/16/2022    INTERPRETATION:  CT PELVIS BONY  HISTORY: Fall with pain.  IMPRESSION:  1. No fractures are seen.  2. Lower lumbar degenerative disc disease with moderate-to-severe spinal   canal stenosis at L4-L5. Please refer to the concurrent CT lumbar spine.         CT LUMBAR SPINE                        PROCEDURE DATE:  03/16/2022    INTERPRETATION:  CT LUMBAR SPINE  CLINICAL INFORMATION: fall  IMPRESSION:  No evidence of an acute lumbar spine fracture.  Multilevel degenerative changes greatest at L4-5 where severe facet and   ligamentous degenerative changes results in grade 1 spondylolisthesis of   8 mm. Associated moderate to severe canal and moderate bilateral neural   foramina narrowing      CT CERVICAL SPINE  & CT BRAIN                        PROCEDURE DATE:  03/16/2022    INTERPRETATION:  CLINICAL INDICATION: Trauma.  IMPRESSION:  CT HEAD: No acute abnormality. Chronic changes as above.  CT CERVICAL SPINE: No acute abnormality. Chronic changes as above.  Head CT was personally reviewed by me.      EKG:    As per my review shows SR at 60/min, normal GA & QTc intervals, normal QRS voltage, duration, and axis (zero), with normal transition, no ST-T abnormality.      -

## 2022-03-16 NOTE — ED ADULT NURSE NOTE - OBJECTIVE STATEMENT
82yo female biba, pt c/o "I fell again" as per pt. pt c/o back pain. pt ambulates well with minor assistance.

## 2022-03-16 NOTE — H&P ADULT - PROBLEM SELECTOR PLAN 2
ML 2ry to hydrochlorothiazide use, will hold, already received 1000 ml of NS bolus earlier by ED team so can't work her up for possible other underlying aitiology, patient is asymptomatic regarding hyponatremia, started her on NS infusion at 75 ml/h, monitor sodium level.

## 2022-03-16 NOTE — H&P ADULT - HISTORY OF PRESENT ILLNESS
This is an 80 y/o F with PMH of HTN, Dyslipidemia, CAD s/p PCI, COPD, Hashimoto's Thyroiditis, Recurrent UTIs on suppressive therapy, and Spinal Stenosis s/p Laminectomy with Chronic Back Pain who presented with sever lower back pain radiating to her buttocks & upper thigh B/L, was getting worse since she fell on Friday 6 days ago, then again yesterday, stating that she has lower back pain for years, but recently she feels weak when she wakes up from sleep & fall if she tries to walk immediately after, pain much worse since her fall yesterday, can't walk or even move in bed, but denies any tingling or numbness in her legs or feet, no focal muscle weakness, no change regarding her urination or bowel control. At the ED her T head, CT cervical spine, LS & pelvis all showed no fractures or acute findings, but patient unable to ambulate because of pain.

## 2022-03-16 NOTE — H&P ADULT - PROBLEM SELECTOR PLAN 5
on suppressive therapy with Nitrofurantoin will hold for fear of long term lung toxicity, discussed with patient.

## 2022-03-16 NOTE — H&P ADULT - NSHPREVIEWOFSYSTEMS_GEN_ALL_CORE
-    CONSTITUTIONAL: No fever or chills.  EYES: No eye pain, visual disturbances, or discharge.  ENMT:  No difficulty hearing, vertigo, sinus or throat pain.  NECK: No pain or stiffness.	  RESPIRATORY: No cough, wheezing, or hemoptysis; No shortness of breath.  CARDIOVASCULAR: No chest pain, palpitations, dizziness, or leg swelling.  GASTROINTESTINAL: No abdominal pain, no nausea, vomiting, or hematemesis; No diarrhea or Change in bowel habits. No melena or hematochezia.  GENITOURINARY: No dysuria, new frequency, no hematuria, or incontinence.  NEUROLOGICAL: No headaches, focal muscle weakness, numbness, or tremors.  SKIN: No itching, burning or rashes.  MUSCULOSKELETAL: No joint swelling, (+) severe back pain as described above.  PSYCHIATRIC: No depression, anxiety, or agitation.  HEME/LYMPH: No easy bruising, bleeding gums, or nose bleed.  ALLERGY AND IMMUNOLOGIC: No hives or eczema.

## 2022-03-16 NOTE — H&P ADULT - NSHPPHYSICALEXAM_GEN_ALL_CORE
-    Vital Signs Last 24 Hrs  T(C): 36.7 (16 Mar 2022 18:29), Max: 36.7 (16 Mar 2022 18:29)  T(F): 98 (16 Mar 2022 18:29), Max: 98 (16 Mar 2022 18:29)  HR: 62 (16 Mar 2022 18:29) (62 - 68)  BP: 125/74 (16 Mar 2022 18:29) (125/74 - 135/64)  BP(mean): --  RR: 15 (16 Mar 2022 18:29) (14 - 20)  SpO2: 97% (16 Mar 2022 18:29) (97% - 100%)        PHYSICAL EXAM:  		  GENERAL: NAD, well-groomed, well-developed, in obvious severe pain.  HEAD:  Atraumatic, Norm cephalic.  EYES: PERRLA, conjunctiva clear, (+) IOL.  ENMT: no nasal discharge, MMM.   NECK: Supple, No JVD.  NERVOUS SYSTEM:  Alert & oriented X3, neurologically intact grossly.  CHEST/LUNG: Good air entry B/L, no rales, rhonchi, or wheezing.  HEART: Normal S1 & acc S2, no murmurs, or extra sounds.  ABDOMEN: Soft, non-tender, non-distended; bowel sounds present, no palpable masses or organomegaly.  EXTREMITIES:  No clubbing, cyanosis, or edema.  VASCULAR: 2+ radial, brachial pulses B/L, no carotid bruits.  SKIN: No rashes or lesions.  PSYCH: normal affect & behavior.

## 2022-03-16 NOTE — H&P ADULT - PROBLEM SELECTOR PLAN 3
controlled, hold HCTZ as stated above, continue Lisinopril & Metoprolol tartrate with hold parameters, may consider adding a CCB if needed.

## 2022-03-16 NOTE — ED PROVIDER NOTE - CHPI ED SYMPTOMS NEG
no loss of consciousness/no numbness/no weakness no loss of consciousness/no numbness/no vomiting/no weakness
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
## 2022-03-17 DIAGNOSIS — M53.3 SACROCOCCYGEAL DISORDERS, NOT ELSEWHERE CLASSIFIED: ICD-10-CM

## 2022-03-17 DIAGNOSIS — M48.00 SPINAL STENOSIS, SITE UNSPECIFIED: ICD-10-CM

## 2022-03-17 DIAGNOSIS — E87.6 HYPOKALEMIA: ICD-10-CM

## 2022-03-17 LAB
ANION GAP SERPL CALC-SCNC: 8 MMOL/L — SIGNIFICANT CHANGE UP (ref 5–17)
BUN SERPL-MCNC: 16 MG/DL — SIGNIFICANT CHANGE UP (ref 7–23)
CALCIUM SERPL-MCNC: 8.6 MG/DL — SIGNIFICANT CHANGE UP (ref 8.4–10.5)
CHLORIDE SERPL-SCNC: 93 MMOL/L — LOW (ref 96–108)
CK SERPL-CCNC: 158 U/L — SIGNIFICANT CHANGE UP (ref 26–192)
CO2 SERPL-SCNC: 28 MMOL/L — SIGNIFICANT CHANGE UP (ref 22–31)
CREAT SERPL-MCNC: 0.62 MG/DL — SIGNIFICANT CHANGE UP (ref 0.5–1.3)
CRP SERPL-MCNC: <3 MG/L — SIGNIFICANT CHANGE UP
EGFR: 89 ML/MIN/1.73M2 — SIGNIFICANT CHANGE UP
GLUCOSE SERPL-MCNC: 85 MG/DL — SIGNIFICANT CHANGE UP (ref 70–99)
HCT VFR BLD CALC: 39.3 % — SIGNIFICANT CHANGE UP (ref 34.5–45)
HGB BLD-MCNC: 13.4 G/DL — SIGNIFICANT CHANGE UP (ref 11.5–15.5)
MCHC RBC-ENTMCNC: 30.3 PG — SIGNIFICANT CHANGE UP (ref 27–34)
MCHC RBC-ENTMCNC: 34.1 GM/DL — SIGNIFICANT CHANGE UP (ref 32–36)
MCV RBC AUTO: 88.9 FL — SIGNIFICANT CHANGE UP (ref 80–100)
NRBC # BLD: 0 /100 WBCS — SIGNIFICANT CHANGE UP (ref 0–0)
PLATELET # BLD AUTO: 166 K/UL — SIGNIFICANT CHANGE UP (ref 150–400)
POTASSIUM SERPL-MCNC: 3.3 MMOL/L — LOW (ref 3.5–5.3)
POTASSIUM SERPL-SCNC: 3.3 MMOL/L — LOW (ref 3.5–5.3)
RBC # BLD: 4.42 M/UL — SIGNIFICANT CHANGE UP (ref 3.8–5.2)
RBC # FLD: 12.2 % — SIGNIFICANT CHANGE UP (ref 10.3–14.5)
SODIUM SERPL-SCNC: 129 MMOL/L — LOW (ref 135–145)
TSH SERPL-MCNC: 4.15 UIU/ML — SIGNIFICANT CHANGE UP (ref 0.27–4.2)
WBC # BLD: 3.95 K/UL — SIGNIFICANT CHANGE UP (ref 3.8–10.5)
WBC # FLD AUTO: 3.95 K/UL — SIGNIFICANT CHANGE UP (ref 3.8–10.5)

## 2022-03-17 PROCEDURE — 99233 SBSQ HOSP IP/OBS HIGH 50: CPT

## 2022-03-17 RX ORDER — LIDOCAINE 4 G/100G
1 CREAM TOPICAL DAILY
Refills: 0 | Status: DISCONTINUED | OUTPATIENT
Start: 2022-03-17 | End: 2022-03-18

## 2022-03-17 RX ORDER — DEXAMETHASONE 0.5 MG/5ML
6 ELIXIR ORAL EVERY 8 HOURS
Refills: 0 | Status: COMPLETED | OUTPATIENT
Start: 2022-03-17 | End: 2022-03-18

## 2022-03-17 RX ORDER — DEXAMETHASONE 0.5 MG/5ML
6 ELIXIR ORAL ONCE
Refills: 0 | Status: COMPLETED | OUTPATIENT
Start: 2022-03-17 | End: 2022-03-17

## 2022-03-17 RX ORDER — POTASSIUM CHLORIDE 20 MEQ
40 PACKET (EA) ORAL ONCE
Refills: 0 | Status: COMPLETED | OUTPATIENT
Start: 2022-03-17 | End: 2022-03-17

## 2022-03-17 RX ORDER — ACETAMINOPHEN 500 MG
650 TABLET ORAL EVERY 8 HOURS
Refills: 0 | Status: DISCONTINUED | OUTPATIENT
Start: 2022-03-17 | End: 2022-03-21

## 2022-03-17 RX ORDER — IBUPROFEN 200 MG
400 TABLET ORAL THREE TIMES A DAY
Refills: 0 | Status: DISCONTINUED | OUTPATIENT
Start: 2022-03-17 | End: 2022-03-17

## 2022-03-17 RX ORDER — PANTOPRAZOLE SODIUM 20 MG/1
40 TABLET, DELAYED RELEASE ORAL
Refills: 0 | Status: DISCONTINUED | OUTPATIENT
Start: 2022-03-17 | End: 2022-03-21

## 2022-03-17 RX ADMIN — Medication 15 MILLIGRAM(S): at 22:20

## 2022-03-17 RX ADMIN — Medication 15 MILLIGRAM(S): at 00:00

## 2022-03-17 RX ADMIN — Medication 15 MILLIGRAM(S): at 22:03

## 2022-03-17 RX ADMIN — GABAPENTIN 300 MILLIGRAM(S): 400 CAPSULE ORAL at 11:47

## 2022-03-17 RX ADMIN — Medication 15 MILLIGRAM(S): at 14:13

## 2022-03-17 RX ADMIN — Medication 40 MILLIEQUIVALENT(S): at 11:47

## 2022-03-17 RX ADMIN — SODIUM CHLORIDE 75 MILLILITER(S): 9 INJECTION INTRAMUSCULAR; INTRAVENOUS; SUBCUTANEOUS at 05:04

## 2022-03-17 RX ADMIN — Medication 15 MILLIGRAM(S): at 06:00

## 2022-03-17 RX ADMIN — Medication 25 MILLIGRAM(S): at 19:01

## 2022-03-17 RX ADMIN — Medication 6 MILLIGRAM(S): at 19:00

## 2022-03-17 RX ADMIN — Medication 15 MILLIGRAM(S): at 14:30

## 2022-03-17 RX ADMIN — LISINOPRIL 20 MILLIGRAM(S): 2.5 TABLET ORAL at 05:05

## 2022-03-17 RX ADMIN — ENOXAPARIN SODIUM 40 MILLIGRAM(S): 100 INJECTION SUBCUTANEOUS at 22:03

## 2022-03-17 RX ADMIN — Medication 25 MILLIGRAM(S): at 05:05

## 2022-03-17 RX ADMIN — Medication 15 MILLIGRAM(S): at 05:07

## 2022-03-17 RX ADMIN — ATORVASTATIN CALCIUM 40 MILLIGRAM(S): 80 TABLET, FILM COATED ORAL at 21:31

## 2022-03-17 RX ADMIN — Medication 6 MILLIGRAM(S): at 21:31

## 2022-03-17 RX ADMIN — GABAPENTIN 300 MILLIGRAM(S): 400 CAPSULE ORAL at 19:01

## 2022-03-17 NOTE — PATIENT PROFILE ADULT - FALL HARM RISK - HARM RISK INTERVENTIONS
Assistance with ambulation/Assistance OOB with selected safe patient handling equipment/Communicate Risk of Fall with Harm to all staff/Discuss with provider need for PT consult/Monitor gait and stability/Reinforce activity limits and safety measures with patient and family/Tailored Fall Risk Interventions/Visual Cue: Yellow wristband and red socks/Bed in lowest position, wheels locked, appropriate side rails in place/Call bell, personal items and telephone in reach/Instruct patient to call for assistance before getting out of bed or chair/Non-slip footwear when patient is out of bed/[place] to call system/Physically safe environment - no spills, clutter or unnecessary equipment/Purposeful Proactive Rounding/Room/bathroom lighting operational, light cord in reach Assistance with ambulation/Assistance OOB with selected safe patient handling equipment/Communicate Risk of Fall with Harm to all staff/Discuss with provider need for PT consult/Monitor gait and stability/Provide patient with walking aids - walker, cane, crutches/Reinforce activity limits and safety measures with patient and family/Tailored Fall Risk Interventions/Visual Cue: Yellow wristband and red socks/Bed in lowest position, wheels locked, appropriate side rails in place/Call bell, personal items and telephone in reach/Instruct patient to call for assistance before getting out of bed or chair/Non-slip footwear when patient is out of bed/[place] to call system/Physically safe environment - no spills, clutter or unnecessary equipment/Purposeful Proactive Rounding/Room/bathroom lighting operational, light cord in reach

## 2022-03-17 NOTE — PROVIDER CONTACT NOTE (OTHER) - ASSESSMENT
Pt is AOx4, observed sleeping w eyes closed. No acute nonverbal or indicated signs of pain. /80 manual cuff RUE. HR 55.

## 2022-03-17 NOTE — CONSULT NOTE ADULT - ASSESSMENT
81F admitted s/p fall with severe LBP    Suspect fall primarily to be due to the hyponatremia. Patient with no complaints of claudication, pain not relieved by positioning/spinal flexion, there is no muscle atrophy, no hyperreflexia, there are downgoing babinskis bilaterally, no bowel or bladder symptoms, no peripheral sensory deficits. Pain also not radiating down the lower extremities but rather into the buttocks which is more indicative of SI joint pain. Exam shows tenderness to palpation across the lower back and across the SI joints bilaterally with overlying trigger points. As such, suspect pain is more local/soft tissue/SI joint related rather than spinal stenosis related.    Now that tapering course of dexamesthasone has been initiated, patient likely to benefit from anti-inflammatory effects, have also added lidocaine patch and tylenol standing order, can continue with gabapentin as well. Have added pantoprazole daily while patient is on oral steroid taper. Warm compress (which have been ordered) or cold packs whichever brings patient more relief, massage/stretching will help as well while waiting for this likely self-limiting pain to resolve. If it does persist we can trial lumbar trigger point injections, or patient can pursue outpatient SI joint injections with pain management.    For ongoing treatment of her spinal stenosis, she already sees a physical therapist consistently, she herself wants to see a spinal surgeon for advice, but I also spoke with her at length regarding technique dependency of epidural injections and that I could recommend a few pain management options for her to trial one or a series of shots once more. She would like to take the day to think about it.    Functionally patient previously independent, and she is to be evaluated by PT/OT tomorrow. Will continue to monitor her progression in therapy to make recommendation for disposition.    80 minutes spent during patient encounter over half of which was spent counseling patient on variety of modality based, topical, oral, bedside-injection based and interventional treatments available for both her current SI joint/lumbar paraspinal pain, as well as those of spinal stenosis. Patient herself was quite well informed of certain aspects of her diagnoses but time was also spent describing pathophysiology/anatomy of spinal stenosis. She acknowledged understanding.

## 2022-03-18 ENCOUNTER — TRANSCRIPTION ENCOUNTER (OUTPATIENT)
Age: 82
End: 2022-03-18

## 2022-03-18 LAB
ANION GAP SERPL CALC-SCNC: 10 MMOL/L — SIGNIFICANT CHANGE UP (ref 5–17)
ANION GAP SERPL CALC-SCNC: 9 MMOL/L — SIGNIFICANT CHANGE UP (ref 5–17)
BUN SERPL-MCNC: 25 MG/DL — HIGH (ref 7–23)
BUN SERPL-MCNC: 29 MG/DL — HIGH (ref 7–23)
CALCIUM SERPL-MCNC: 8.4 MG/DL — SIGNIFICANT CHANGE UP (ref 8.4–10.5)
CALCIUM SERPL-MCNC: 8.6 MG/DL — SIGNIFICANT CHANGE UP (ref 8.4–10.5)
CHLORIDE SERPL-SCNC: 96 MMOL/L — SIGNIFICANT CHANGE UP (ref 96–108)
CHLORIDE SERPL-SCNC: 97 MMOL/L — SIGNIFICANT CHANGE UP (ref 96–108)
CO2 SERPL-SCNC: 21 MMOL/L — LOW (ref 22–31)
CO2 SERPL-SCNC: 22 MMOL/L — SIGNIFICANT CHANGE UP (ref 22–31)
CREAT SERPL-MCNC: 0.53 MG/DL — SIGNIFICANT CHANGE UP (ref 0.5–1.3)
CREAT SERPL-MCNC: 0.66 MG/DL — SIGNIFICANT CHANGE UP (ref 0.5–1.3)
EGFR: 88 ML/MIN/1.73M2 — SIGNIFICANT CHANGE UP
EGFR: 93 ML/MIN/1.73M2 — SIGNIFICANT CHANGE UP
GLUCOSE SERPL-MCNC: 140 MG/DL — HIGH (ref 70–99)
GLUCOSE SERPL-MCNC: 180 MG/DL — HIGH (ref 70–99)
HCT VFR BLD CALC: 37.7 % — SIGNIFICANT CHANGE UP (ref 34.5–45)
HGB BLD-MCNC: 12.9 G/DL — SIGNIFICANT CHANGE UP (ref 11.5–15.5)
MAGNESIUM SERPL-MCNC: 1.9 MG/DL — SIGNIFICANT CHANGE UP (ref 1.6–2.6)
MCHC RBC-ENTMCNC: 30.3 PG — SIGNIFICANT CHANGE UP (ref 27–34)
MCHC RBC-ENTMCNC: 34.2 GM/DL — SIGNIFICANT CHANGE UP (ref 32–36)
MCV RBC AUTO: 88.5 FL — SIGNIFICANT CHANGE UP (ref 80–100)
NRBC # BLD: 0 /100 WBCS — SIGNIFICANT CHANGE UP (ref 0–0)
PHOSPHATE SERPL-MCNC: 3.8 MG/DL — SIGNIFICANT CHANGE UP (ref 2.5–4.5)
PLATELET # BLD AUTO: 188 K/UL — SIGNIFICANT CHANGE UP (ref 150–400)
POTASSIUM SERPL-MCNC: 3.9 MMOL/L — SIGNIFICANT CHANGE UP (ref 3.5–5.3)
POTASSIUM SERPL-MCNC: 4.9 MMOL/L — SIGNIFICANT CHANGE UP (ref 3.5–5.3)
POTASSIUM SERPL-SCNC: 3.9 MMOL/L — SIGNIFICANT CHANGE UP (ref 3.5–5.3)
POTASSIUM SERPL-SCNC: 4.9 MMOL/L — SIGNIFICANT CHANGE UP (ref 3.5–5.3)
RBC # BLD: 4.26 M/UL — SIGNIFICANT CHANGE UP (ref 3.8–5.2)
RBC # FLD: 12.4 % — SIGNIFICANT CHANGE UP (ref 10.3–14.5)
SODIUM SERPL-SCNC: 127 MMOL/L — LOW (ref 135–145)
SODIUM SERPL-SCNC: 128 MMOL/L — LOW (ref 135–145)
WBC # BLD: 3.07 K/UL — LOW (ref 3.8–10.5)
WBC # FLD AUTO: 3.07 K/UL — LOW (ref 3.8–10.5)

## 2022-03-18 PROCEDURE — 99223 1ST HOSP IP/OBS HIGH 75: CPT

## 2022-03-18 PROCEDURE — 99233 SBSQ HOSP IP/OBS HIGH 50: CPT

## 2022-03-18 RX ORDER — DEXAMETHASONE 0.5 MG/5ML
6 ELIXIR ORAL EVERY 12 HOURS
Refills: 0 | Status: COMPLETED | OUTPATIENT
Start: 2022-03-19 | End: 2022-03-20

## 2022-03-18 RX ORDER — PSYLLIUM SEED (WITH DEXTROSE)
1 POWDER (GRAM) ORAL AT BEDTIME
Refills: 0 | Status: DISCONTINUED | OUTPATIENT
Start: 2022-03-18 | End: 2022-03-21

## 2022-03-18 RX ORDER — SODIUM CHLORIDE 9 MG/ML
1 INJECTION INTRAMUSCULAR; INTRAVENOUS; SUBCUTANEOUS
Refills: 0 | Status: DISCONTINUED | OUTPATIENT
Start: 2022-03-18 | End: 2022-03-18

## 2022-03-18 RX ORDER — SODIUM CHLORIDE 9 MG/ML
2 INJECTION INTRAMUSCULAR; INTRAVENOUS; SUBCUTANEOUS THREE TIMES A DAY
Refills: 0 | Status: DISCONTINUED | OUTPATIENT
Start: 2022-03-18 | End: 2022-03-20

## 2022-03-18 RX ADMIN — Medication 650 MILLIGRAM(S): at 21:26

## 2022-03-18 RX ADMIN — GABAPENTIN 300 MILLIGRAM(S): 400 CAPSULE ORAL at 05:25

## 2022-03-18 RX ADMIN — ENOXAPARIN SODIUM 40 MILLIGRAM(S): 100 INJECTION SUBCUTANEOUS at 21:29

## 2022-03-18 RX ADMIN — GABAPENTIN 300 MILLIGRAM(S): 400 CAPSULE ORAL at 18:14

## 2022-03-18 RX ADMIN — SODIUM CHLORIDE 2 GRAM(S): 9 INJECTION INTRAMUSCULAR; INTRAVENOUS; SUBCUTANEOUS at 14:10

## 2022-03-18 RX ADMIN — Medication 6 MILLIGRAM(S): at 21:24

## 2022-03-18 RX ADMIN — Medication 25 MILLIGRAM(S): at 05:25

## 2022-03-18 RX ADMIN — Medication 650 MILLIGRAM(S): at 05:27

## 2022-03-18 RX ADMIN — Medication 650 MILLIGRAM(S): at 21:35

## 2022-03-18 RX ADMIN — SODIUM CHLORIDE 2 GRAM(S): 9 INJECTION INTRAMUSCULAR; INTRAVENOUS; SUBCUTANEOUS at 21:24

## 2022-03-18 RX ADMIN — ATORVASTATIN CALCIUM 40 MILLIGRAM(S): 80 TABLET, FILM COATED ORAL at 21:23

## 2022-03-18 RX ADMIN — Medication 650 MILLIGRAM(S): at 14:55

## 2022-03-18 RX ADMIN — Medication 6 MILLIGRAM(S): at 05:25

## 2022-03-18 RX ADMIN — Medication 650 MILLIGRAM(S): at 14:11

## 2022-03-18 RX ADMIN — Medication 1 PACKET(S): at 21:24

## 2022-03-18 RX ADMIN — PANTOPRAZOLE SODIUM 40 MILLIGRAM(S): 20 TABLET, DELAYED RELEASE ORAL at 05:25

## 2022-03-18 RX ADMIN — Medication 25 MILLIGRAM(S): at 18:14

## 2022-03-18 RX ADMIN — LISINOPRIL 20 MILLIGRAM(S): 2.5 TABLET ORAL at 05:26

## 2022-03-18 RX ADMIN — Medication 6 MILLIGRAM(S): at 14:10

## 2022-03-18 RX ADMIN — Medication 650 MILLIGRAM(S): at 06:27

## 2022-03-18 NOTE — DISCHARGE NOTE PROVIDER - HOSPITAL COURSE
80 y/o F with PMH of HTN, Dyslipidemia, CAD s/p PCI, COPD, Hashimoto's Thyroiditis, Recurrent UTIs on suppressive therapy, and Spinal Stenosis s/p Laminectomy with Chronic Back Pain presented with intractable lower back pain and hyponatremia.     Problem/Plan - 1:  ·  Problem: Intractable low back pain.   ·  Plan: Acetaminophen   Continue Gabapentin 300mg BID  Continue Decadron 6mg Q12h scheduled, taper down  F/u Orthopedic surgery-spine- recs appreciated, f/u with Dr. Mao upon DC  f/u Pain management- recs appreciated  CT L spine showing Moderate-severe spinal stenosis in L4-L5.     Problem/Plan - 2:  ·  Problem: Hyponatremia.   ·  Plan: likely due to HCTZ use  resolving  Na 132  will d/c na tablet, iv fluids  as bp is around 180-200 systolic.    80 y/o F with PMH of HTN, Dyslipidemia, CAD s/p PCI, COPD, Hashimoto's Thyroiditis, Recurrent UTIs on suppressive therapy, and Spinal Stenosis s/p Laminectomy with Chronic Back Pain presented with intractable lower back pain and hyponatremia.      HTN (hypertension) secondary to axniety  given xanax much improved   asytmptaomtic  on lisioprinil and toprol, (declined norvasac states she developed pedal edema)  d/c na tablet  suspect htn induced from anxiety, will give xanax 0.25   increased lisinopril from20 mg to 20 mg bid  and will start on hydralzine 10 mg.    Hypokalemia.    resolved.     Dyslipidemia.    continue Atorvastatin (formulary for Rosuvastatin).    : Recurrent UTI (urinary tract infection).   ·on suppressive therapy with Nitrofurantoin will hold for fear of long term lung toxicity, discussed with patient.    severe back pain  on neuontin   tolerated well steroids  d/c planning to [facility]

## 2022-03-18 NOTE — PHYSICAL THERAPY INITIAL EVALUATION ADULT - GENERAL OBSERVATIONS, REHAB EVAL
This is an 82 y/o F with PMH of HTN, Dyslipidemia, CAD s/p PCI, COPD, Hashimoto's Thyroiditis, Recurrent UTIs on suppressive therapy, and Spinal Stenosis s/p Laminectomy with Chronic Back Pain who presented with sever lower back pain radiating to her buttocks & upper thigh B/L, was getting worse since she fell on Friday 6 days ago, then again yesterday, stating that she has lower back pain for years, but recently she feels weak when she wakes up from sleep & fall if she tries to walk immediately after, pain much worse since her fall yesterday, can't walk or even move in bed, but denies any tingling or numbness in her legs or feet, no focal muscle weakness, no change regarding her urination or bowel control

## 2022-03-18 NOTE — PHYSICAL THERAPY INITIAL EVALUATION ADULT - GAIT DEVIATIONS NOTED, PT EVAL
decreased yakov/increased time in double stance/decreased velocity of limb motion/decreased step length/decreased stride length

## 2022-03-18 NOTE — CONSULT NOTE ADULT - SUBJECTIVE AND OBJECTIVE BOX
Physical Medicine and Rehabilitation Initial Evaluation    Patients acute care records reviewed and are summarized as follows:     Patient is a 81F with past medical history including HTN, CHLD, CAD, COPD, Hashimotos thyroiditis, recurrent UTI's, spinal stenosis s/p laminectomy with chronic low back pain who is admitted to acute care following a fall after which she has had progressively worsening low back pain radiating into the buttocks and upper posterior thighs bilaterally. CT surveillance of the head, CTLS spine was negative for any acute fractures but consistent with degenerative changes.    Radiological studies reviewed, including CTLS spine as above.    Medical studies/laboratory studies reviewed, includin.4   3.95  )-----------( 166      ( 17 Mar 2022 08:27 )             39.3   03-17    129<L>  |  93<L>  |  16  ----------------------------<  85  3.3<L>   |  28  |  0.62    Ca    8.6      17 Mar 2022 08:27    TPro  6.1  /  Alb  3.4  /  TBili  0.3  /  DBili  x   /  AST  21  /  ALT  28  /  AlkPhos  74  03-16    The patient was seen and examined at bedside. Complains of low back pain, severity is up to 9/10 at worst, improves with rest and lying still, worse with movement and transfers as well as ROM to the low back. No associated symptoms such as bowel or bladder issues, numbness or tingling. No relation to timing of the pain. It radiates in the buttocks and posterior thighs.      ROS:  Constitutional: Denies fevers or chills  Eyes: Denies blurry vision or double vision  Ears/Nose/Mouth/Throat: Denies any pain on swallowing or dry mouth or runny nose  CV: Denies chest pain or palpitations  Respiratory: Denies cough or dyspnea  GI: Denies nausea, vomiting, abdominal pain  : Denies urinary frequency or dysuria  MSK: Low back pain  Neuro: Denies any headache or dizziness  Psychiatric: Denies depression or anxiety    PM, Social, Family Hx: as above in HPI, Family history reviewed and found to be non pertinent to patients current disposition, denies alcohol use regularly -- occasional 1-2 servings per month, no illicit drug use, no tobacco use.    Physical Exam:   Vitals: Vital Signs Last 24 Hrs  T(C): 36.8 (17 Mar 2022 15:12), Max: 36.8 (17 Mar 2022 15:12)  T(F): 98.2 (17 Mar 2022 15:12), Max: 98.2 (17 Mar 2022 15:12)  HR: 63 (17 Mar 2022 15:12) (52 - 63)  BP: 123/76 (17 Mar 2022 15:12) (123/76 - 194/76)  BP(mean): --  RR: 17 (17 Mar 2022 15:12) (16 - 18)  SpO2: 95% (17 Mar 2022 15:12) (95% - 98%)    Constitutional: Gen: In no acute distress, cooperative with exam and questioning   Eyes: PERRL, no erythema of conjunctivae  Ears/Nose/Mouth/Throat: Mucous membranes moist, no thrush, no rhinorrhea  CV: Regular rate and rhythm, S1 S2, trace peripheral edema, pedal pulses intact  Resp: Good respiratory effort, Good air movement all lung fields  GI: Nontender, normoactive bowel sounds  Neuro: Cranial Nerves II-XII intact, sensation intact to light touch in peripheral upper and lower extremities all dermatomes, reflexes symmetrical no hyper or hyporeflexia noted bilaterally patellae and achilles, downgoing babinski bilaterally  MSK: No cyanosis or clubbing of nails, strength 4 to 5/5 in bilateral upper and lower extremities, ROM full in all extremities passively, no atrophy notes, tenderness to palpation of the bilateral lumbar paraspinal region as well as bilateral SI joint pain, shear stress at SI joint replicates patients pain  Neck: No midline tenderness to palpation, supple  Skin: No rashes on limbs, normal temperature on palpation  Psychiatric: Awake alert fully oriented  
This is an 81 y.o. female with long h/o lumbar spine issues. She underwent lumbar decompression by Dr. Shen Bennett in 1989. She did well initially post-operatively but over the years has had intermittent LBP and radicular pain secondary to Lumbar disc disease, spondylolisthesis, and spinal stenosis. She has been under the care of multiple providers including spine surgeons, pain management specialists, and physical therapists. She has been relatively stable with conservative treatment up until 3/11/22 when she sustained a fall secondary to a syncopal episode. She was admitted with hyponatremia and severe LBP with radiculopathy. Dr. Shabbir Mao was consulted and patient was started on IV Decadron. She is also taking gabapentin which she takes at home. Patient is reporting complete relief of her symptoms currently. She does admit to baseline balance issues. Patient has investigated the possibility of more definitive surgical intervention but has not yet been indicated for any operative procedures. She states she had an MRI 2 weeks ago at [facility]. She is unclear on what avenue she should pursue in terms of her treatment options.    On Exam:  Patient is resting in bed. A&O x 3  Lumbar spine is NT to palpation. NT gluteal muscles and sciatic notch.  Bilateral LEs:         Motor exam:                  Lower extremity                 HF         HAb       HAd       KF          KE         DF     PF     Ev       Inv     EHL                                               R        5/5        5/5          5/5       5/5         5/5       5/5   5/5    5/5     5/5     5/5                                               L         5/5        5/5          5/5       5/5         5/5       5/5   5/5    5/5     5/5     5/5          Negative SLR bilaterally         Sensation intact throughout           Calves supple and NT                                              1+ PT pulses          SCDs in place           CT Scan Lumbar Spine: IMPRESSION:  No evidence of an acute lumbar spine fracture.  Multilevel degenerative changes greatest at L4-5 where severe facet and   ligamentous degenerative changes results in grade 1 spondylolisthesis of   8 mm. Associated moderate to severe canal and moderate bilateral neural   foramina narrowing     A/P: patient with long standing LBP with radiculopathy. She does have moderate to severe canal stenosis and bilateral neural foraminal stenosis. She was seen today by Dr. Mao as well. We had a lengthy discussion with her regarding operative vs. nonoperative intervention as well as indications for both. All questions were answered to the patient's satisfaction. Plan is for discharge to rehab once medically stable and to follow up with Dr. Mao as an outpatient. Patient is in agreement.

## 2022-03-18 NOTE — DISCHARGE NOTE PROVIDER - NSDCFUSCHEDAPPT_GEN_ALL_CORE_FT
KOURTNEY WILLS ; 04/13/2022 ; [facility] [address] ; 04/13/2022 ; [facility] [address] ; 05/02/2022 ; [facility] [address]

## 2022-03-18 NOTE — PHYSICAL THERAPY INITIAL EVALUATION ADULT - PERTINENT HX OF CURRENT PROBLEM, REHAB EVAL
Pt rec'd supine in bed + IV heplock + Primafit catheter Pt agreeable to PT treatment which was tolerated fairly well.

## 2022-03-18 NOTE — DISCHARGE NOTE PROVIDER - NSDCMRMEDTOKEN_GEN_ALL_CORE_FT
Cleocin HCl 300 mg oral capsule: 1 cap(s) orally every 6 hours    atorvastatin 40 mg oral tablet: 1 tab(s) orally once a day (at bedtime)  Cleocin HCl 300 mg oral capsule: 1 cap(s) orally every 6 hours   enoxaparin: 40 microgram(s) subcutaneous once a day  gabapentin 400 mg oral capsule: 1 cap(s) orally 2 times a day  hydrALAZINE 10 mg oral tablet: 1 tab(s) orally every 8 hours  lisinopril 20 mg oral tablet: 1 tab(s) orally 2 times a day  metoprolol tartrate 25 mg oral tablet: 1 tab(s) orally 2 times a day  pantoprazole 40 mg oral delayed release tablet: 1 tab(s) orally once a day (before a meal)

## 2022-03-18 NOTE — DISCHARGE NOTE PROVIDER - CARE PROVIDER_API CALL
Shabbir Mao (MD)  Orthopaedic Surgery  [address], [address]  [place] [address]  Phone: [phone]  Fax: [phone]  Follow Up Time: 2 weeks

## 2022-03-18 NOTE — DISCHARGE NOTE PROVIDER - NSDCCPCAREPLAN_GEN_ALL_CORE_FT
PRINCIPAL DISCHARGE DIAGNOSIS  Diagnosis: Hyponatremia  Assessment and Plan of Treatment:       SECONDARY DISCHARGE DIAGNOSES  Diagnosis: Back injury  Assessment and Plan of Treatment:     Diagnosis: Lumbar canal stenosis  Assessment and Plan of Treatment: f/u with Dr. Mao as outpatient.     PRINCIPAL DISCHARGE DIAGNOSIS  Diagnosis: Back injury  Assessment and Plan of Treatment:       SECONDARY DISCHARGE DIAGNOSES  Diagnosis: Lumbar canal stenosis  Assessment and Plan of Treatment: f/u with Dr. Mao as outpatient  cliniclly improved with steroids  and stared on gabapentin    Diagnosis: Back injury  Assessment and Plan of Treatment:

## 2022-03-19 LAB
ANION GAP SERPL CALC-SCNC: 11 MMOL/L — SIGNIFICANT CHANGE UP (ref 5–17)
BUN SERPL-MCNC: 18 MG/DL — SIGNIFICANT CHANGE UP (ref 7–23)
CALCIUM SERPL-MCNC: 8.8 MG/DL — SIGNIFICANT CHANGE UP (ref 8.4–10.5)
CHLORIDE SERPL-SCNC: 97 MMOL/L — SIGNIFICANT CHANGE UP (ref 96–108)
CO2 SERPL-SCNC: 22 MMOL/L — SIGNIFICANT CHANGE UP (ref 22–31)
CREAT SERPL-MCNC: 0.56 MG/DL — SIGNIFICANT CHANGE UP (ref 0.5–1.3)
EGFR: 92 ML/MIN/1.73M2 — SIGNIFICANT CHANGE UP
GLUCOSE SERPL-MCNC: 127 MG/DL — HIGH (ref 70–99)
HCT VFR BLD CALC: 40.8 % — SIGNIFICANT CHANGE UP (ref 34.5–45)
HGB BLD-MCNC: 13.8 G/DL — SIGNIFICANT CHANGE UP (ref 11.5–15.5)
MAGNESIUM SERPL-MCNC: 2.1 MG/DL — SIGNIFICANT CHANGE UP (ref 1.6–2.6)
MCHC RBC-ENTMCNC: 30.3 PG — SIGNIFICANT CHANGE UP (ref 27–34)
MCHC RBC-ENTMCNC: 33.8 GM/DL — SIGNIFICANT CHANGE UP (ref 32–36)
MCV RBC AUTO: 89.7 FL — SIGNIFICANT CHANGE UP (ref 80–100)
NRBC # BLD: 0 /100 WBCS — SIGNIFICANT CHANGE UP (ref 0–0)
OSMOLALITY SERPL: 279 MOSMOL/KG — LOW (ref 280–301)
PHOSPHATE SERPL-MCNC: 3 MG/DL — SIGNIFICANT CHANGE UP (ref 2.5–4.5)
PLATELET # BLD AUTO: 195 K/UL — SIGNIFICANT CHANGE UP (ref 150–400)
POTASSIUM SERPL-MCNC: 4.1 MMOL/L — SIGNIFICANT CHANGE UP (ref 3.5–5.3)
POTASSIUM SERPL-SCNC: 4.1 MMOL/L — SIGNIFICANT CHANGE UP (ref 3.5–5.3)
RBC # BLD: 4.55 M/UL — SIGNIFICANT CHANGE UP (ref 3.8–5.2)
RBC # FLD: 12.6 % — SIGNIFICANT CHANGE UP (ref 10.3–14.5)
SODIUM SERPL-SCNC: 130 MMOL/L — LOW (ref 135–145)
WBC # BLD: 9.63 K/UL — SIGNIFICANT CHANGE UP (ref 3.8–10.5)
WBC # FLD AUTO: 9.63 K/UL — SIGNIFICANT CHANGE UP (ref 3.8–10.5)

## 2022-03-19 PROCEDURE — 99232 SBSQ HOSP IP/OBS MODERATE 35: CPT

## 2022-03-19 RX ORDER — HYDRALAZINE HCL 50 MG
10 TABLET ORAL EVERY 8 HOURS
Refills: 0 | Status: DISCONTINUED | OUTPATIENT
Start: 2022-03-19 | End: 2022-03-20

## 2022-03-19 RX ORDER — HYDRALAZINE HCL 50 MG
10 TABLET ORAL ONCE
Refills: 0 | Status: COMPLETED | OUTPATIENT
Start: 2022-03-19 | End: 2022-03-19

## 2022-03-19 RX ORDER — GABAPENTIN 400 MG/1
100 CAPSULE ORAL ONCE
Refills: 0 | Status: COMPLETED | OUTPATIENT
Start: 2022-03-19 | End: 2022-03-19

## 2022-03-19 RX ORDER — AMLODIPINE BESYLATE 2.5 MG/1
5 TABLET ORAL DAILY
Refills: 0 | Status: DISCONTINUED | OUTPATIENT
Start: 2022-03-19 | End: 2022-03-19

## 2022-03-19 RX ORDER — GABAPENTIN 400 MG/1
400 CAPSULE ORAL
Refills: 0 | Status: DISCONTINUED | OUTPATIENT
Start: 2022-03-20 | End: 2022-03-21

## 2022-03-19 RX ADMIN — ENOXAPARIN SODIUM 40 MILLIGRAM(S): 100 INJECTION SUBCUTANEOUS at 21:11

## 2022-03-19 RX ADMIN — SODIUM CHLORIDE 2 GRAM(S): 9 INJECTION INTRAMUSCULAR; INTRAVENOUS; SUBCUTANEOUS at 21:10

## 2022-03-19 RX ADMIN — Medication 10 MILLIGRAM(S): at 21:10

## 2022-03-19 RX ADMIN — Medication 1 PACKET(S): at 21:10

## 2022-03-19 RX ADMIN — LISINOPRIL 20 MILLIGRAM(S): 2.5 TABLET ORAL at 06:00

## 2022-03-19 RX ADMIN — Medication 25 MILLIGRAM(S): at 17:37

## 2022-03-19 RX ADMIN — SODIUM CHLORIDE 2 GRAM(S): 9 INJECTION INTRAMUSCULAR; INTRAVENOUS; SUBCUTANEOUS at 11:49

## 2022-03-19 RX ADMIN — GABAPENTIN 300 MILLIGRAM(S): 400 CAPSULE ORAL at 17:37

## 2022-03-19 RX ADMIN — PANTOPRAZOLE SODIUM 40 MILLIGRAM(S): 20 TABLET, DELAYED RELEASE ORAL at 06:01

## 2022-03-19 RX ADMIN — Medication 10 MILLIGRAM(S): at 22:47

## 2022-03-19 RX ADMIN — Medication 650 MILLIGRAM(S): at 06:00

## 2022-03-19 RX ADMIN — SODIUM CHLORIDE 2 GRAM(S): 9 INJECTION INTRAMUSCULAR; INTRAVENOUS; SUBCUTANEOUS at 06:01

## 2022-03-19 RX ADMIN — Medication 650 MILLIGRAM(S): at 11:49

## 2022-03-19 RX ADMIN — SODIUM CHLORIDE 75 MILLILITER(S): 9 INJECTION INTRAMUSCULAR; INTRAVENOUS; SUBCUTANEOUS at 06:02

## 2022-03-19 RX ADMIN — GABAPENTIN 100 MILLIGRAM(S): 400 CAPSULE ORAL at 22:44

## 2022-03-19 RX ADMIN — ATORVASTATIN CALCIUM 40 MILLIGRAM(S): 80 TABLET, FILM COATED ORAL at 21:10

## 2022-03-19 RX ADMIN — Medication 650 MILLIGRAM(S): at 12:30

## 2022-03-19 RX ADMIN — Medication 10 MILLIGRAM(S): at 11:49

## 2022-03-19 RX ADMIN — Medication 650 MILLIGRAM(S): at 21:10

## 2022-03-19 RX ADMIN — Medication 6 MILLIGRAM(S): at 17:38

## 2022-03-19 RX ADMIN — Medication 6 MILLIGRAM(S): at 06:00

## 2022-03-19 RX ADMIN — Medication 650 MILLIGRAM(S): at 06:20

## 2022-03-19 RX ADMIN — GABAPENTIN 300 MILLIGRAM(S): 400 CAPSULE ORAL at 06:00

## 2022-03-19 RX ADMIN — Medication 650 MILLIGRAM(S): at 21:30

## 2022-03-19 RX ADMIN — Medication 25 MILLIGRAM(S): at 06:00

## 2022-03-20 LAB
ALBUMIN SERPL ELPH-MCNC: 3.3 G/DL — SIGNIFICANT CHANGE UP (ref 3.3–5)
ALP SERPL-CCNC: 68 U/L — SIGNIFICANT CHANGE UP (ref 30–120)
ALT FLD-CCNC: 36 U/L DA — SIGNIFICANT CHANGE UP (ref 10–60)
ANION GAP SERPL CALC-SCNC: 8 MMOL/L — SIGNIFICANT CHANGE UP (ref 5–17)
AST SERPL-CCNC: 19 U/L — SIGNIFICANT CHANGE UP (ref 10–40)
BILIRUB SERPL-MCNC: 0.3 MG/DL — SIGNIFICANT CHANGE UP (ref 0.2–1.2)
BUN SERPL-MCNC: 19 MG/DL — SIGNIFICANT CHANGE UP (ref 7–23)
CALCIUM SERPL-MCNC: 8.4 MG/DL — SIGNIFICANT CHANGE UP (ref 8.4–10.5)
CHLORIDE SERPL-SCNC: 100 MMOL/L — SIGNIFICANT CHANGE UP (ref 96–108)
CO2 SERPL-SCNC: 24 MMOL/L — SIGNIFICANT CHANGE UP (ref 22–31)
CREAT SERPL-MCNC: 0.5 MG/DL — SIGNIFICANT CHANGE UP (ref 0.5–1.3)
EGFR: 94 ML/MIN/1.73M2 — SIGNIFICANT CHANGE UP
GLUCOSE SERPL-MCNC: 103 MG/DL — HIGH (ref 70–99)
HCT VFR BLD CALC: 38.6 % — SIGNIFICANT CHANGE UP (ref 34.5–45)
HGB BLD-MCNC: 13.2 G/DL — SIGNIFICANT CHANGE UP (ref 11.5–15.5)
MCHC RBC-ENTMCNC: 30.6 PG — SIGNIFICANT CHANGE UP (ref 27–34)
MCHC RBC-ENTMCNC: 34.2 GM/DL — SIGNIFICANT CHANGE UP (ref 32–36)
MCV RBC AUTO: 89.6 FL — SIGNIFICANT CHANGE UP (ref 80–100)
NRBC # BLD: 0 /100 WBCS — SIGNIFICANT CHANGE UP (ref 0–0)
PLATELET # BLD AUTO: 191 K/UL — SIGNIFICANT CHANGE UP (ref 150–400)
POTASSIUM SERPL-MCNC: 3.7 MMOL/L — SIGNIFICANT CHANGE UP (ref 3.5–5.3)
POTASSIUM SERPL-SCNC: 3.7 MMOL/L — SIGNIFICANT CHANGE UP (ref 3.5–5.3)
PROT SERPL-MCNC: 6.3 G/DL — SIGNIFICANT CHANGE UP (ref 6–8.3)
RBC # BLD: 4.31 M/UL — SIGNIFICANT CHANGE UP (ref 3.8–5.2)
RBC # FLD: 12.8 % — SIGNIFICANT CHANGE UP (ref 10.3–14.5)
SODIUM SERPL-SCNC: 132 MMOL/L — LOW (ref 135–145)
WBC # BLD: 9.38 K/UL — SIGNIFICANT CHANGE UP (ref 3.8–10.5)
WBC # FLD AUTO: 9.38 K/UL — SIGNIFICANT CHANGE UP (ref 3.8–10.5)

## 2022-03-20 PROCEDURE — 99232 SBSQ HOSP IP/OBS MODERATE 35: CPT

## 2022-03-20 RX ORDER — ALPRAZOLAM 0.25 MG
0.25 TABLET ORAL ONCE
Refills: 0 | Status: DISCONTINUED | OUTPATIENT
Start: 2022-03-20 | End: 2022-03-20

## 2022-03-20 RX ORDER — AMLODIPINE BESYLATE 2.5 MG/1
10 TABLET ORAL DAILY
Refills: 0 | Status: DISCONTINUED | OUTPATIENT
Start: 2022-03-20 | End: 2022-03-20

## 2022-03-20 RX ORDER — HYDRALAZINE HCL 50 MG
10 TABLET ORAL EVERY 8 HOURS
Refills: 0 | Status: DISCONTINUED | OUTPATIENT
Start: 2022-03-20 | End: 2022-03-21

## 2022-03-20 RX ORDER — LISINOPRIL 2.5 MG/1
20 TABLET ORAL
Refills: 0 | Status: DISCONTINUED | OUTPATIENT
Start: 2022-03-20 | End: 2022-03-21

## 2022-03-20 RX ADMIN — Medication 650 MILLIGRAM(S): at 22:15

## 2022-03-20 RX ADMIN — Medication 0.25 MILLIGRAM(S): at 10:30

## 2022-03-20 RX ADMIN — Medication 650 MILLIGRAM(S): at 06:55

## 2022-03-20 RX ADMIN — Medication 650 MILLIGRAM(S): at 22:12

## 2022-03-20 RX ADMIN — ENOXAPARIN SODIUM 40 MILLIGRAM(S): 100 INJECTION SUBCUTANEOUS at 22:13

## 2022-03-20 RX ADMIN — Medication 650 MILLIGRAM(S): at 14:31

## 2022-03-20 RX ADMIN — Medication 650 MILLIGRAM(S): at 13:38

## 2022-03-20 RX ADMIN — Medication 6 MILLIGRAM(S): at 06:55

## 2022-03-20 RX ADMIN — GABAPENTIN 400 MILLIGRAM(S): 400 CAPSULE ORAL at 17:15

## 2022-03-20 RX ADMIN — Medication 25 MILLIGRAM(S): at 17:15

## 2022-03-20 RX ADMIN — PANTOPRAZOLE SODIUM 40 MILLIGRAM(S): 20 TABLET, DELAYED RELEASE ORAL at 06:55

## 2022-03-20 RX ADMIN — GABAPENTIN 400 MILLIGRAM(S): 400 CAPSULE ORAL at 06:55

## 2022-03-20 RX ADMIN — LISINOPRIL 20 MILLIGRAM(S): 2.5 TABLET ORAL at 06:55

## 2022-03-20 RX ADMIN — Medication 25 MILLIGRAM(S): at 06:55

## 2022-03-20 RX ADMIN — Medication 6 MILLIGRAM(S): at 17:15

## 2022-03-20 RX ADMIN — Medication 0.25 MILLIGRAM(S): at 00:34

## 2022-03-20 RX ADMIN — SODIUM CHLORIDE 2 GRAM(S): 9 INJECTION INTRAMUSCULAR; INTRAVENOUS; SUBCUTANEOUS at 06:55

## 2022-03-20 RX ADMIN — AMLODIPINE BESYLATE 10 MILLIGRAM(S): 2.5 TABLET ORAL at 06:55

## 2022-03-20 RX ADMIN — Medication 10 MILLIGRAM(S): at 22:12

## 2022-03-20 RX ADMIN — Medication 10 MILLIGRAM(S): at 13:37

## 2022-03-20 RX ADMIN — LISINOPRIL 20 MILLIGRAM(S): 2.5 TABLET ORAL at 17:15

## 2022-03-20 NOTE — PROGRESS NOTE ADULT - PROBLEM SELECTOR PLAN 3
K 3.3  repleted today  monitor BMP daily
HCTZ held  bp 180-200 systolic  asytmptaomtic  on lisioprinil and toprol, (declined norvasac states she developed pedal edema)  d/c na tablet  suspect htn induced from anxiety, will give xanax 0.25   will increase lisinopril from20 mg to 20 mg bid  and will start on hydralzine 10 mg
resolved  K 3.3  repleted   monitor BMP daily
resolved  K 3.3  repleted   monitor BMP daily

## 2022-03-20 NOTE — PROGRESS NOTE ADULT - PROBLEM SELECTOR PLAN 7
started her on LMWH 40 mg of Lovenox sub Q daily for DVT prophylaxis.
Continue Lovenox 40mg daily for DVT ppx  Continue Protonix 40mg daily for GI ppx while pt is on NSAIDs and steroids    Dispo: DC to [facility] once bp better controlled
Continue Lovenox 40mg daily for DVT ppx  Continue Protonix 40mg daily for GI ppx while pt is on NSAIDs and steroids    Dispo: DC to [facility] pending normalization of sodium level
Continue Lovenox 40mg daily for DVT ppx  Continue Protonix 40mg daily for GI ppx while pt is on NSAIDs and steroids    Dispo: DC to [facility] once bp better controlled

## 2022-03-20 NOTE — PROGRESS NOTE ADULT - ASSESSMENT
82 y/o F with PMH of HTN, Dyslipidemia, CAD s/p PCI, COPD, Hashimoto's Thyroiditis, Recurrent UTIs on suppressive therapy, and Spinal Stenosis s/p Laminectomy with Chronic Back Pain presented with intractable lower back pain and hyponatremia.
81F with LBP, weakness, functional deficits    Remains good candidate for [facility] following medical stability. Patient with labile BP's. As far as therapy goes, her parameters for therapy can be considered as 220/110, while primary team works to improve overall control. Her pain is better controlled and she seems relatively anxious about the whole situation, advised patient to trial relaxation techniques, reassured patient. 
81F with LBP    Patient tolerating regimen well at this time with good relief overnight. Discussed with her at length regarding her current medication regimen, discussed taper of prednisone, and to take protonix for a few days even after cessation of the steroids. Discussed the GI side effects as well as those of NSAIDS as she tends to take NSAIDS at home for the pain at times. She acknowledged understanding. Also discussed the rehab setting and answered her questions regarding what to expect, the discussion included detail about acute vs subacute rehabilitation, anticipated duration of stay, and what to do following discharge from [facility] which I advised she would likely receive direction from primary MD at the facility or with physiatry at the facility where she ultimately is discharged to however also advised she continue with PT following discharge, home exercise program, routine followups, discussed outpatient OTC pain medication and modality based strategies as well.    Overall 45 minutes spent at patient bedside over half of which was utilized counseling as above.    She can follow up with pain management a few options being:     [facility],  Dr. Davion Mcallister [address] [address] ([phone])  Dr. Barrett at [facility] across the street from [facility].
82 y/o F with PMH of HTN, Dyslipidemia, CAD s/p PCI, COPD, Hashimoto's Thyroiditis, Recurrent UTIs on suppressive therapy, and Spinal Stenosis s/p Laminectomy with Chronic Back Pain presented with intractable lower back pain
80 y/o F with PMH of HTN, Dyslipidemia, CAD s/p PCI, COPD, Hashimoto's Thyroiditis, Recurrent UTIs on suppressive therapy, and Spinal Stenosis s/p Laminectomy with Chronic Back Pain presented with intractable lower back pain and hyponatremia.
82 y/o F with PMH of HTN, Dyslipidemia, CAD s/p PCI, COPD, Hashimoto's Thyroiditis, Recurrent UTIs on suppressive therapy, and Spinal Stenosis s/p Laminectomy with Chronic Back Pain presented with intractable lower back pain and hyponatremia.

## 2022-03-20 NOTE — PROGRESS NOTE ADULT - PROBLEM SELECTOR PLAN 2
likely due to HCTZ use  resolving  Na 130, will d/c iv fluids likely contributing to elevated HTN   continue on NaCl tablets 2gm PO TID
likely due to HCTZ use, will hold  Continue NS @75cc/hr  monitor Na level  f/u daily BMP
likely due to HCTZ use  resolving  Na 132  will d/c na tablet, iv fluids  as bp is around 180-200 systolic
likely due to HCTZ use, will hold  Continue NS @75cc/hr  started on NaCl tablets 2gm PO TID  monitor Na level  Obtain osmolality and repeat BMP in PM  f/u daily BMP

## 2022-03-20 NOTE — PROGRESS NOTE ADULT - PROBLEM SELECTOR PROBLEM 6
Recurrent UTI (urinary tract infection)

## 2022-03-20 NOTE — PROGRESS NOTE ADULT - PROBLEM SELECTOR PLAN 1
Continue with IV Acetaminophen  Continue Toradol PRN  Continue Gabapentin  F/u Orthopedic surgery-spine  f/u Pain management    CT L spine showing Moderate-severe spinal stenosis in L4-L5
Acetaminophen   Continue Gabapentin 300mg BID  Continue Decadron 6mg Q12h scheduled, taper down  F/u Orthopedic surgery-spine- recs appreciated, f/u with Dr. Moa upon DC  f/u Pain management- recs appreciated  CT L spine showing Moderate-severe spinal stenosis in L4-L5
Acetaminophen changed from IV to PO scheduled  Toradol PRN discontinued  Continue Gabapentin 300mg BID  Continue Decadron 6mg Q8h scheduled, taper down  F/u Orthopedic surgery-spine- recs appreciated, f/u with Dr. Mao upon DC  f/u Pain management- recs appreciated    CT L spine showing Moderate-severe spinal stenosis in L4-L5
Acetaminophen   Continue Gabapentin 300mg BID  Continue Decadron 6mg Q12h scheduled, taper down  F/u Orthopedic surgery-spine- recs appreciated, f/u with Dr. Mao upon DC  f/u Pain management- recs appreciated  CT L spine showing Moderate-severe spinal stenosis in L4-L5

## 2022-03-20 NOTE — PROGRESS NOTE ADULT - REASON FOR ADMISSION
Severe lower back pain.

## 2022-03-20 NOTE — PROGRESS NOTE ADULT - PROBLEM SELECTOR PLAN 4
HCTZ held  bp 180-200 systolic  asytmptaomtic  on lisioprinil and toprol, (declined norvasac states she developed pedal edema)  will add hydralazine 10 mg tid  d/c iv fluids
HCTZ held  continue Lisinopril & Metoprolol tartrate with hold parameters  may consider adding a CCB if needed.
HCTZ held  continue Lisinopril & Metoprolol tartrate with hold parameters  monitor BP due to steroid use and NaCl tablets, both can raise BP  may consider adding a CCB if needed.
resolved

## 2022-03-20 NOTE — PROGRESS NOTE ADULT - PROBLEM SELECTOR PLAN 5
continue Atorvastatin (formulary for Rosuvastatin).

## 2022-03-20 NOTE — PROGRESS NOTE ADULT - SUBJECTIVE AND OBJECTIVE BOX
Subjective:  Patient is doing well today and notes pain control when lying in bed. She is worried about having to ambulate and experiencing pain. She admits to seeing pain management in the past for her back issues, and received epidurals, but did not find it to alleviate her pain. She otherwise has no complaints.     MEDICATIONS  (STANDING):  atorvastatin 40 milliGRAM(s) Oral at bedtime  dexAMETHasone     Tablet 6 milliGRAM(s) Oral every 8 hours  dexAMETHasone  Injectable 6 milliGRAM(s) IV Push once  enoxaparin Injectable 40 milliGRAM(s) SubCutaneous every 24 hours  gabapentin 300 milliGRAM(s) Oral two times a day  lisinopril 20 milliGRAM(s) Oral daily  metoprolol tartrate 25 milliGRAM(s) Oral two times a day  sodium chloride 0.9%. 1000 milliLiter(s) (75 mL/Hr) IV Continuous <Continuous>    MEDICATIONS  (PRN):  ketorolac   Injectable 15 milliGRAM(s) IV Push every 6 hours PRN Severe Pain (7 - 10)      Allergies    morphine (Blisters)    Intolerances    codeine (Nausea)      Vital Signs Last 24 Hrs  T(C): 36.8 (17 Mar 2022 15:12), Max: 36.8 (17 Mar 2022 15:12)  T(F): 98.2 (17 Mar 2022 15:12), Max: 98.2 (17 Mar 2022 15:12)  HR: 63 (17 Mar 2022 15:12) (52 - 63)  BP: 123/76 (17 Mar 2022 15:12) (123/76 - 203/80)  BP(mean): --  RR: 17 (17 Mar 2022 15:12) (15 - 20)  SpO2: 95% (17 Mar 2022 15:12) (95% - 98%)    PHYSICAL EXAM:  GENERAL: NAD, well-groomed, well-developed  HEAD:  Atraumatic, Normocephalic  ENMT: Moist mucous membranes,   NECK: Supple, No JVD, Normal thyroid  NERVOUS SYSTEM:  All 4 extremities mobile, no gross sensory deficits.   CHEST/LUNG: Clear to auscultation bilaterally; No rales, rhonchi, wheezing, or rubs  HEART: Regular rate and rhythm; No murmurs, rubs, or gallops  ABDOMEN: Soft, Nontender, Nondistended; Bowel sounds present  EXTREMITIES:  2+ Peripheral Pulses, No clubbing, cyanosis, or edema      LABS:                        13.4   3.95  )-----------( 166      ( 17 Mar 2022 08:27 )             39.3     17 Mar 2022 08:27    129    |  93     |  16     ----------------------------<  85     3.3     |  28     |  0.62     Ca    8.6        17 Mar 2022 08:27      PT/INR - ( 16 Mar 2022 15:16 )   PT: 11.7 sec;   INR: 1.02 ratio         PTT - ( 16 Mar 2022 15:16 )  PTT:26.9 sec    CAPILLARY BLOOD GLUCOSE          RADIOLOGY & ADDITIONAL TESTS:    Imaging Personally Reviewed:  [ ] YES     Consultant(s) Notes Reviewed:      Care Discussed with Consultants/Other Providers:    Advanced Directives: [ ] DNR  [ ] No feeding tube  [ ] MOLST in chart  [ ] MOLST completed today  [ ] Unknown  
Physical Medicine and Rehabilitation Subsequent Evaluation    patient seen in follow-up for functional deficits, pain management    patient seen and examined at bedside today. Patients pain is still improved, she feels overall that she is on the right track but remains weak and was awaiting discharge to [facility] however patients BP spiked and she seems to be somewhat worried/frustrated about the setback, her anxiety of which may be further exacerbating her BP. Advised patient as such, and that primary team is adjusting BP. She has no complaints of numbness or tingling, or any particular neurological deficits at this time.      ROS:  Constitutional: Denies fevers or chills  MSK: Low back pain is improved, continues to feel weak    PM, Social, Family Hx: unchanged from initial evaluation.    Physical Exam:   Vital Signs Last 24 Hrs  T(C): 36.4 (20 Mar 2022 17:15), Max: 36.9 (20 Mar 2022 08:20)  T(F): 97.5 (20 Mar 2022 17:15), Max: 98.4 (20 Mar 2022 08:20)  HR: 77 (20 Mar 2022 17:15) (51 - 77)  BP: 153/70 (20 Mar 2022 17:15) (142/71 - 190/88)  BP(mean): --  RR: 16 (20 Mar 2022 17:15) (16 - 18)  SpO2: 98% (20 Mar 2022 17:15) (95% - 98%)    Constitutional: Gen: In no acute distress, cooperative with exam and questioning   CV: Regular rate and rhythm, S1 S2, trace peripheral edema, pedal pulses intact  Resp: Good respiratory effort, Good air movement all lung fields  Neuro: Cranial Nerves II-XII intact, sensation intact to light touch in peripheral upper and lower extremities all dermatomes, reflexes symmetrical no hyper or hyporeflexia noted bilaterally patellae and achilles, downgoing babinski bilaterally  MSK: No cyanosis or clubbing of nails, strength 4 to 5/5 in bilateral upper and lower extremities, ROM full in all extremities passively, no atrophy notes, tenderness to palpation of the bilateral lumbar paraspinal region as well as bilateral SI joint pain, shear stress at SI joint replicates patients pain  Psychiatric: Awake alert fully oriented
Patient is a 81y old  Female who presents with a chief complaint of Severe lower back pain. (18 Mar 2022 14:35)        HPI:  This is an 80 y/o F with PMH of HTN, Dyslipidemia, CAD s/p PCI, COPD, Hashimoto's Thyroiditis, Recurrent UTIs on suppressive therapy, and Spinal Stenosis s/p Laminectomy with Chronic Back Pain who presented with sever lower back pain radiating to her buttocks & upper thigh B/L, was getting worse since she fell on Friday 6 days ago, then again yesterday, stating that she has lower back pain for years, but recently she feels weak when she wakes up from sleep & fall if she tries to walk immediately after, pain much worse since her fall yesterday, can't walk or even move in bed, but denies any tingling or numbness in her legs or feet, no focal muscle weakness, no change regarding her urination or bowel control. At the ED her T head, CT cervical spine, LS & pelvis all showed no fractures or acute findings, but patient unable to ambulate because of pain.  (16 Mar 2022 22:03)      SUBJECTIVE & OBJECTIVE: Pt seen and examined at bedside. bp markedly elevated 190s     PHYSICAL EXAM:  T(C): 36.6 (03-18-22 @ 23:30), Max: 36.7 (03-18-22 @ 15:13)  HR: 58 (03-19-22 @ 06:00) (53 - 66)  BP: 178/88 (03-19-22 @ 06:00) (128/71 - 184/79)  RR: 17 (03-18-22 @ 23:30) (17 - 17)  SpO2: 97% (03-18-22 @ 23:30) (95% - 97%)  Wt(kg): --   GENERAL: NAD, well-groomed, well-developed  NERVOUS SYSTEM:  Alert & Oriented X3,   CHEST/LUNG: Clear to auscultation bilaterally; No rales, rhonchi, wheezing, or rubs  HEART: Regular rate and rhythm; No murmurs, rubs, or gallops  ABDOMEN: Soft, Nontender, Nondistended; Bowel sounds present  EXTREMITIES:  2+ Peripheral Pulses, No clubbing, cyanosis, or edema        MEDICATIONS  (STANDING):  acetaminophen     Tablet .. 650 milliGRAM(s) Oral every 8 hours  atorvastatin 40 milliGRAM(s) Oral at bedtime  dexAMETHasone     Tablet 6 milliGRAM(s) Oral every 12 hours  enoxaparin Injectable 40 milliGRAM(s) SubCutaneous every 24 hours  gabapentin 300 milliGRAM(s) Oral two times a day  hydrALAZINE 10 milliGRAM(s) Oral every 8 hours  lisinopril 20 milliGRAM(s) Oral daily  metoprolol tartrate 25 milliGRAM(s) Oral two times a day  pantoprazole    Tablet 40 milliGRAM(s) Oral before breakfast  psyllium Powder 1 Packet(s) Oral at bedtime  sodium chloride 2 Gram(s) Oral three times a day  sodium chloride 0.9%. 1000 milliLiter(s) (75 mL/Hr) IV Continuous <Continuous>    MEDICATIONS  (PRN):      LABS:                        13.8   9.63  )-----------( 195      ( 19 Mar 2022 06:48 )             40.8     03-19    130<L>  |  97  |  18  ----------------------------<  127<H>  4.1   |  22  |  0.56    Ca    8.8      19 Mar 2022 06:48  Phos  3.0     03-19  Mg     2.1     03-19          Magnesium, Serum: 2.1 mg/dL (03-19 @ 06:48)  Magnesium, Serum: 1.9 mg/dL (03-18 @ 07:54)    CAPILLARY BLOOD GLUCOSE          CAPILLARY BLOOD GLUCOSE        CAPILLARY BLOOD GLUCOSE          CARDIAC MARKERS ( 17 Mar 2022 08:27 )  x     / x     / 158 U/L / x     / x            RECENT CULTURES:      RADIOLOGY & ADDITIONAL TESTS:                        DVT/GI ppx  Discussed with pt @ bedside
Physical Medicine and Rehabilitation Subsequent Evaluation    patient seen in follow-up for pain management following revision of regimen    patient seen and examined at bedside today. She notes improvement following revision of regimen which was made yesterday evening. she was also assessed by physical therapy and the notes were reviewed in her medical record, she is a good candidate for subacute rehabilitation given persistent deficits in mobility, strength, balance. She notes that her pain is nearly completely resolved this afternoon. She does not desire any further escalation of her regimen.                                    12.9   3.07  )-----------( 188      ( 18 Mar 2022 07:54 )             37.7   03-18    128<L>  |  96  |  29<H>  ----------------------------<  140<H>  4.9   |  22  |  0.66    Ca    8.6      18 Mar 2022 07:54  Phos  3.8     03-18  Mg     1.9     03-18    TPro  6.1  /  Alb  3.4  /  TBili  0.3  /  DBili  x   /  AST  21  /  ALT  28  /  AlkPhos  74  03-16      ROS:  Constitutional: Denies fevers or chills  MSK: Low back pain is improved    PM, Social, Family Hx: unchanged from initial evaluation yesterday.    Physical Exam:   Vital Signs Last 24 Hrs  T(C): 36.3 (18 Mar 2022 07:57), Max: 36.8 (17 Mar 2022 15:12)  T(F): 97.4 (18 Mar 2022 07:57), Max: 98.2 (17 Mar 2022 15:12)  HR: 53 (18 Mar 2022 07:57) (53 - 66)  BP: 159/76 (18 Mar 2022 07:57) (123/76 - 159/76)  BP(mean): --  RR: 17 (18 Mar 2022 07:57) (16 - 17)  SpO2: 95% (18 Mar 2022 07:57) (95% - 96%)    Constitutional: Gen: In no acute distress, cooperative with exam and questioning   CV: Regular rate and rhythm, S1 S2, trace peripheral edema, pedal pulses intact  Resp: Good respiratory effort, Good air movement all lung fields  Neuro: Cranial Nerves II-XII intact, sensation intact to light touch in peripheral upper and lower extremities all dermatomes, reflexes symmetrical no hyper or hyporeflexia noted bilaterally patellae and achilles, downgoing babinski bilaterally  MSK: No cyanosis or clubbing of nails, strength 4 to 5/5 in bilateral upper and lower extremities, ROM full in all extremities passively, no atrophy notes, tenderness to palpation of the bilateral lumbar paraspinal region as well as bilateral SI joint pain, shear stress at SI joint replicates patients pain  Psychiatric: Awake alert fully oriented
Patient is a 81y old  Female who presents with a chief complaint of Severe lower back pain. (19 Mar 2022 07:47)        HPI:  This is an 82 y/o F with PMH of HTN, Dyslipidemia, CAD s/p PCI, COPD, Hashimoto's Thyroiditis, Recurrent UTIs on suppressive therapy, and Spinal Stenosis s/p Laminectomy with Chronic Back Pain who presented with sever lower back pain radiating to her buttocks & upper thigh B/L, was getting worse since she fell on Friday 6 days ago, then again yesterday, stating that she has lower back pain for years, but recently she feels weak when she wakes up from sleep & fall if she tries to walk immediately after, pain much worse since her fall yesterday, can't walk or even move in bed, but denies any tingling or numbness in her legs or feet, no focal muscle weakness, no change regarding her urination or bowel control. At the ED her T head, CT cervical spine, LS & pelvis all showed no fractures or acute findings, but patient unable to ambulate because of pain.  (16 Mar 2022 22:03)      SUBJECTIVE & OBJECTIVE: Pt seen and examined at bedside.  markdely elevated bp     PHYSICAL EXAM:  T(C): 36.9 (03-20-22 @ 08:20), Max: 36.9 (03-20-22 @ 08:20)  HR: 51 (03-20-22 @ 08:20) (51 - 77)  BP: 184/78 (03-20-22 @ 08:20) (163/82 - 190/88)  RR: 18 (03-20-22 @ 08:20) (17 - 18)  SpO2: 96% (03-20-22 @ 08:20) (95% - 97%)  Wt(kg): --   GENERAL: NAD, well-groomed, well-developed  HEAD:  Atraumatic, Normocephalic  NERVOUS SYSTEM:  Alert & Oriented X3,   CHEST/LUNG: Clear to auscultation bilaterally; No rales, rhonchi, wheezing, or rubs  HEART: Regular rate and rhythm; No murmurs, rubs, or gallops  ABDOMEN: Soft, Nontender, Nondistended; Bowel sounds present  EXTREMITIES:  2+ Peripheral Pulses, No clubbing, cyanosis, or edema        MEDICATIONS  (STANDING):  acetaminophen     Tablet .. 650 milliGRAM(s) Oral every 8 hours  ALPRAZolam 0.25 milliGRAM(s) Oral once  atorvastatin 40 milliGRAM(s) Oral at bedtime  dexAMETHasone     Tablet 6 milliGRAM(s) Oral every 12 hours  enoxaparin Injectable 40 milliGRAM(s) SubCutaneous every 24 hours  gabapentin 400 milliGRAM(s) Oral two times a day  hydrALAZINE 10 milliGRAM(s) Oral every 8 hours  lisinopril 20 milliGRAM(s) Oral two times a day  metoprolol tartrate 25 milliGRAM(s) Oral two times a day  pantoprazole    Tablet 40 milliGRAM(s) Oral before breakfast  psyllium Powder 1 Packet(s) Oral at bedtime    MEDICATIONS  (PRN):      LABS:                        13.2   9.38  )-----------( 191      ( 20 Mar 2022 06:42 )             38.6     03-20    132<L>  |  100  |  19  ----------------------------<  103<H>  3.7   |  24  |  0.50    Ca    8.4      20 Mar 2022 06:42  Phos  3.0     03-19  Mg     2.1     03-19    TPro  6.3  /  Alb  3.3  /  TBili  0.3  /  DBili  x   /  AST  19  /  ALT  36  /  AlkPhos  68  03-20          CAPILLARY BLOOD GLUCOSE          CAPILLARY BLOOD GLUCOSE        CAPILLARY BLOOD GLUCOSE                RECENT CULTURES:      RADIOLOGY & ADDITIONAL TESTS:                        DVT/GI ppx  Discussed with pt @ bedside
Subjective: Patient was informed that her sodium was still low this AM, started on sodium chloride tablets. Overnight, patient was upset that her medication regimen changed without notice. But she did not an improvement with Decadron.  She was seen by Dr. Mao (Ortho-spine) today who gave the patient surgical and non-surgical options for her back pain. Patient agreed to rehab at [facility] or [facility] upon discharge.    MEDICATIONS  (STANDING):  acetaminophen     Tablet .. 650 milliGRAM(s) Oral every 8 hours  atorvastatin 40 milliGRAM(s) Oral at bedtime  dexAMETHasone     Tablet 6 milliGRAM(s) Oral every 8 hours  enoxaparin Injectable 40 milliGRAM(s) SubCutaneous every 24 hours  gabapentin 300 milliGRAM(s) Oral two times a day  lisinopril 20 milliGRAM(s) Oral daily  metoprolol tartrate 25 milliGRAM(s) Oral two times a day  pantoprazole    Tablet 40 milliGRAM(s) Oral before breakfast  sodium chloride 2 Gram(s) Oral three times a day  sodium chloride 0.9%. 1000 milliLiter(s) (75 mL/Hr) IV Continuous <Continuous>    MEDICATIONS  (PRN):      Allergies    morphine (Blisters)    Intolerances    codeine (Nausea)      Vital Signs Last 24 Hrs  T(C): 36.3 (18 Mar 2022 07:57), Max: 36.8 (17 Mar 2022 15:12)  T(F): 97.4 (18 Mar 2022 07:57), Max: 98.2 (17 Mar 2022 15:12)  HR: 53 (18 Mar 2022 07:57) (53 - 66)  BP: 159/76 (18 Mar 2022 07:57) (123/76 - 159/76)  BP(mean): --  RR: 17 (18 Mar 2022 07:57) (16 - 17)  SpO2: 95% (18 Mar 2022 07:57) (95% - 96%)    PHYSICAL EXAM:  GENERAL: NAD, well-groomed, well-developed  HEAD:  Atraumatic, Normocephalic  ENMT: Moist mucous membranes,   NECK: Supple, No JVD, Normal thyroid  NERVOUS SYSTEM:  All 4 extremities mobile, no gross sensory deficits.   CHEST/LUNG: Clear to auscultation bilaterally; No rales, rhonchi, wheezing, or rubs  HEART: Regular rate and rhythm; No murmurs, rubs, or gallops  ABDOMEN: Soft, Nontender, Nondistended; Bowel sounds present  EXTREMITIES:  2+ Peripheral Pulses, No clubbing, cyanosis, or edema      LABS:                        12.9   3.07  )-----------( 188      ( 18 Mar 2022 07:54 )             37.7     18 Mar 2022 07:54    128    |  96     |  29     ----------------------------<  140    4.9     |  22     |  0.66     Ca    8.6        18 Mar 2022 07:54  Phos  3.8       18 Mar 2022 07:54  Mg     1.9       18 Mar 2022 07:54      PT/INR - ( 16 Mar 2022 15:16 )   PT: 11.7 sec;   INR: 1.02 ratio         PTT - ( 16 Mar 2022 15:16 )  PTT:26.9 sec    CAPILLARY BLOOD GLUCOSE          RADIOLOGY & ADDITIONAL TESTS:    Imaging Personally Reviewed:  [ ] YES     Consultant(s) Notes Reviewed:      Care Discussed with Consultants/Other Providers:    Advanced Directives: [ ] DNR  [ ] No feeding tube  [ ] MOLST in chart  [ ] MOLST completed today  [ ] Unknown

## 2022-03-21 ENCOUNTER — TRANSCRIPTION ENCOUNTER (OUTPATIENT)
Age: 82
End: 2022-03-21

## 2022-03-21 VITALS
TEMPERATURE: 98 F | OXYGEN SATURATION: 97 % | RESPIRATION RATE: 16 BRPM | DIASTOLIC BLOOD PRESSURE: 61 MMHG | HEART RATE: 71 BPM | SYSTOLIC BLOOD PRESSURE: 121 MMHG

## 2022-03-21 LAB
ALBUMIN SERPL ELPH-MCNC: 3.3 G/DL — SIGNIFICANT CHANGE UP (ref 3.3–5)
ALP SERPL-CCNC: 64 U/L — SIGNIFICANT CHANGE UP (ref 30–120)
ALT FLD-CCNC: 29 U/L DA — SIGNIFICANT CHANGE UP (ref 10–60)
ANION GAP SERPL CALC-SCNC: 9 MMOL/L — SIGNIFICANT CHANGE UP (ref 5–17)
AST SERPL-CCNC: 16 U/L — SIGNIFICANT CHANGE UP (ref 10–40)
BILIRUB SERPL-MCNC: 0.2 MG/DL — SIGNIFICANT CHANGE UP (ref 0.2–1.2)
BUN SERPL-MCNC: 23 MG/DL — SIGNIFICANT CHANGE UP (ref 7–23)
CALCIUM SERPL-MCNC: 8.3 MG/DL — LOW (ref 8.4–10.5)
CHLORIDE SERPL-SCNC: 99 MMOL/L — SIGNIFICANT CHANGE UP (ref 96–108)
CO2 SERPL-SCNC: 22 MMOL/L — SIGNIFICANT CHANGE UP (ref 22–31)
CREAT SERPL-MCNC: 0.57 MG/DL — SIGNIFICANT CHANGE UP (ref 0.5–1.3)
EGFR: 91 ML/MIN/1.73M2 — SIGNIFICANT CHANGE UP
GLUCOSE SERPL-MCNC: 101 MG/DL — HIGH (ref 70–99)
HCT VFR BLD CALC: 39.1 % — SIGNIFICANT CHANGE UP (ref 34.5–45)
HGB BLD-MCNC: 13.3 G/DL — SIGNIFICANT CHANGE UP (ref 11.5–15.5)
MCHC RBC-ENTMCNC: 30.6 PG — SIGNIFICANT CHANGE UP (ref 27–34)
MCHC RBC-ENTMCNC: 34 GM/DL — SIGNIFICANT CHANGE UP (ref 32–36)
MCV RBC AUTO: 90.1 FL — SIGNIFICANT CHANGE UP (ref 80–100)
NRBC # BLD: 0 /100 WBCS — SIGNIFICANT CHANGE UP (ref 0–0)
PLATELET # BLD AUTO: 203 K/UL — SIGNIFICANT CHANGE UP (ref 150–400)
POTASSIUM SERPL-MCNC: 4 MMOL/L — SIGNIFICANT CHANGE UP (ref 3.5–5.3)
POTASSIUM SERPL-SCNC: 4 MMOL/L — SIGNIFICANT CHANGE UP (ref 3.5–5.3)
PROT SERPL-MCNC: 6.1 G/DL — SIGNIFICANT CHANGE UP (ref 6–8.3)
RBC # BLD: 4.34 M/UL — SIGNIFICANT CHANGE UP (ref 3.8–5.2)
RBC # FLD: 12.8 % — SIGNIFICANT CHANGE UP (ref 10.3–14.5)
SARS-COV-2 RNA SPEC QL NAA+PROBE: SIGNIFICANT CHANGE UP
SODIUM SERPL-SCNC: 130 MMOL/L — LOW (ref 135–145)
WBC # BLD: 8.61 K/UL — SIGNIFICANT CHANGE UP (ref 3.8–10.5)
WBC # FLD AUTO: 8.61 K/UL — SIGNIFICANT CHANGE UP (ref 3.8–10.5)

## 2022-03-21 PROCEDURE — 87635 SARS-COV-2 COVID-19 AMP PRB: CPT

## 2022-03-21 PROCEDURE — 70450 CT HEAD/BRAIN W/O DYE: CPT | Mod: MA

## 2022-03-21 PROCEDURE — 83930 ASSAY OF BLOOD OSMOLALITY: CPT

## 2022-03-21 PROCEDURE — 83036 HEMOGLOBIN GLYCOSYLATED A1C: CPT

## 2022-03-21 PROCEDURE — 84443 ASSAY THYROID STIM HORMONE: CPT

## 2022-03-21 PROCEDURE — 99284 EMERGENCY DEPT VISIT MOD MDM: CPT | Mod: 25

## 2022-03-21 PROCEDURE — 93005 ELECTROCARDIOGRAM TRACING: CPT

## 2022-03-21 PROCEDURE — 97110 THERAPEUTIC EXERCISES: CPT

## 2022-03-21 PROCEDURE — 97161 PT EVAL LOW COMPLEX 20 MIN: CPT

## 2022-03-21 PROCEDURE — 36415 COLL VENOUS BLD VENIPUNCTURE: CPT

## 2022-03-21 PROCEDURE — 84100 ASSAY OF PHOSPHORUS: CPT

## 2022-03-21 PROCEDURE — 96365 THER/PROPH/DIAG IV INF INIT: CPT

## 2022-03-21 PROCEDURE — 72131 CT LUMBAR SPINE W/O DYE: CPT | Mod: MA

## 2022-03-21 PROCEDURE — 72125 CT NECK SPINE W/O DYE: CPT | Mod: MA

## 2022-03-21 PROCEDURE — 85025 COMPLETE CBC W/AUTO DIFF WBC: CPT

## 2022-03-21 PROCEDURE — 80053 COMPREHEN METABOLIC PANEL: CPT

## 2022-03-21 PROCEDURE — 86140 C-REACTIVE PROTEIN: CPT

## 2022-03-21 PROCEDURE — 85027 COMPLETE CBC AUTOMATED: CPT

## 2022-03-21 PROCEDURE — 99239 HOSP IP/OBS DSCHRG MGMT >30: CPT

## 2022-03-21 PROCEDURE — 72192 CT PELVIS W/O DYE: CPT | Mod: MA

## 2022-03-21 PROCEDURE — 73502 X-RAY EXAM HIP UNI 2-3 VIEWS: CPT

## 2022-03-21 PROCEDURE — 72100 X-RAY EXAM L-S SPINE 2/3 VWS: CPT

## 2022-03-21 PROCEDURE — 99285 EMERGENCY DEPT VISIT HI MDM: CPT

## 2022-03-21 PROCEDURE — 85730 THROMBOPLASTIN TIME PARTIAL: CPT

## 2022-03-21 PROCEDURE — 83735 ASSAY OF MAGNESIUM: CPT

## 2022-03-21 PROCEDURE — 82550 ASSAY OF CK (CPK): CPT

## 2022-03-21 PROCEDURE — 97116 GAIT TRAINING THERAPY: CPT

## 2022-03-21 PROCEDURE — 80048 BASIC METABOLIC PNL TOTAL CA: CPT

## 2022-03-21 PROCEDURE — 85610 PROTHROMBIN TIME: CPT

## 2022-03-21 RX ORDER — ATORVASTATIN CALCIUM 80 MG/1
1 TABLET, FILM COATED ORAL
Qty: 0 | Refills: 0 | DISCHARGE
Start: 2022-03-21

## 2022-03-21 RX ORDER — GABAPENTIN 400 MG/1
1 CAPSULE ORAL
Qty: 0 | Refills: 0 | DISCHARGE
Start: 2022-03-21

## 2022-03-21 RX ORDER — HYDRALAZINE HCL 50 MG
1 TABLET ORAL
Qty: 0 | Refills: 0 | DISCHARGE
Start: 2022-03-21

## 2022-03-21 RX ORDER — LISINOPRIL 2.5 MG/1
1 TABLET ORAL
Qty: 0 | Refills: 0 | DISCHARGE
Start: 2022-03-21

## 2022-03-21 RX ORDER — ALPRAZOLAM 0.25 MG
0.25 TABLET ORAL ONCE
Refills: 0 | Status: DISCONTINUED | OUTPATIENT
Start: 2022-03-21 | End: 2022-03-21

## 2022-03-21 RX ORDER — PANTOPRAZOLE SODIUM 20 MG/1
1 TABLET, DELAYED RELEASE ORAL
Qty: 0 | Refills: 0 | DISCHARGE
Start: 2022-03-21

## 2022-03-21 RX ORDER — ENOXAPARIN SODIUM 100 MG/ML
40 INJECTION SUBCUTANEOUS
Qty: 0 | Refills: 0 | DISCHARGE
Start: 2022-03-21

## 2022-03-21 RX ORDER — METOPROLOL TARTRATE 50 MG
1 TABLET ORAL
Qty: 0 | Refills: 0 | DISCHARGE
Start: 2022-03-21

## 2022-03-21 RX ADMIN — Medication 25 MILLIGRAM(S): at 05:59

## 2022-03-21 RX ADMIN — Medication 0.25 MILLIGRAM(S): at 09:24

## 2022-03-21 RX ADMIN — Medication 650 MILLIGRAM(S): at 05:59

## 2022-03-21 RX ADMIN — Medication 650 MILLIGRAM(S): at 13:55

## 2022-03-21 RX ADMIN — Medication 650 MILLIGRAM(S): at 13:28

## 2022-03-21 RX ADMIN — PANTOPRAZOLE SODIUM 40 MILLIGRAM(S): 20 TABLET, DELAYED RELEASE ORAL at 05:59

## 2022-03-21 RX ADMIN — Medication 10 MILLIGRAM(S): at 13:28

## 2022-03-21 RX ADMIN — GABAPENTIN 400 MILLIGRAM(S): 400 CAPSULE ORAL at 05:59

## 2022-03-21 RX ADMIN — LISINOPRIL 20 MILLIGRAM(S): 2.5 TABLET ORAL at 05:59

## 2022-03-21 RX ADMIN — Medication 10 MILLIGRAM(S): at 05:59

## 2022-03-21 NOTE — DISCHARGE NOTE NURSING/CASE MANAGEMENT/SOCIAL WORK - NSDCPEFALRISK_GEN_ALL_CORE
For information on Fall & Injury Prevention, visit: [email or web address] OR  [email or web address] OR  https://www.cdc.gov/steadi/patient.html

## 2022-03-21 NOTE — DISCHARGE NOTE NURSING/CASE MANAGEMENT/SOCIAL WORK - NSDCVIVACCINE_GEN_ALL_CORE_FT
Tdap; 26-Nov-2018 17:04; Phil Centeno (RN); [employer]; [id number] (Exp. Date: 12-Mar-2020); IntraMuscular; Deltoid Left.; 0.5 milliLiter(s); VIS (VIS Published: 09-May-2013, VIS Presented: 26-Nov-2018);

## 2022-03-21 NOTE — DISCHARGE NOTE NURSING/CASE MANAGEMENT/SOCIAL WORK - PATIENT PORTAL LINK FT
You can access the FollowMyHealth Patient Portal offered by [facility] by registering at the following website: [email or web address]. By joining [employer]’s FollowMyHealth portal, you will also be able to view your health information using other applications (apps) compatible with our system.

## 2022-03-21 NOTE — DISCHARGE NOTE NURSING/CASE MANAGEMENT/SOCIAL WORK - NSDPFAC_GEN_ALL_CORE
[facility] [place] acute rehab 3/22/22 at 11:00  [facility] acute rehab 3/21/22 at 4pm

## 2022-04-06 NOTE — PROCEDURE
[Straight Catheterization] : insertion of a straight catheter [Patient] : the patient [___ Fr Straight Tip] : a [unfilled] in [place] straight tip catheter [None] : there were no complications with the catheter insertion [Culture] : culture [Urinalysis] : urinalysis [No Complications] : no complications [Tolerated Well] : the patient tolerated the procedure well [Post procedure instructions and information given] : Post procedure instructions and information were given and reviewed with patient. [Other ___] : [unfilled] [Cloudy] : cloudy [0] : 0 [FreeTextEntry1] : Urethra cleared, Catheter passed. No CVAT Eliptical Excision Additional Text (Leave Blank If You Do Not Want): The margin was drawn around the clinically apparent lesion.  An elliptical shape was then drawn on the skin incorporating the lesion and margins.  Incisions were then made along these lines to the appropriate tissue plane and the lesion was extirpated.

## 2022-04-11 ENCOUNTER — NON-APPOINTMENT (OUTPATIENT)
Age: 82
End: 2022-04-11

## 2022-04-11 PROBLEM — Z11.59 SCREENING FOR VIRAL DISEASE: Status: ACTIVE | Noted: 2020-07-15

## 2022-04-12 ENCOUNTER — NON-APPOINTMENT (OUTPATIENT)
Age: 82
End: 2022-04-12

## 2022-04-13 ENCOUNTER — APPOINTMENT (OUTPATIENT)
Dept: PULMONOLOGY | Facility: CLINIC | Age: 82
End: 2022-04-13

## 2022-04-22 ENCOUNTER — APPOINTMENT (OUTPATIENT)
Dept: PULMONOLOGY | Facility: CLINIC | Age: 82
End: 2022-04-22
Payer: MEDICARE

## 2022-04-22 VITALS
DIASTOLIC BLOOD PRESSURE: 92 MMHG | OXYGEN SATURATION: 91 % | TEMPERATURE: 98.2 F | HEART RATE: 79 BPM | RESPIRATION RATE: 16 BRPM | SYSTOLIC BLOOD PRESSURE: 198 MMHG

## 2022-04-22 VITALS — DIASTOLIC BLOOD PRESSURE: 100 MMHG | SYSTOLIC BLOOD PRESSURE: 194 MMHG

## 2022-04-22 DIAGNOSIS — B34.9 VIRAL INFECTION, UNSPECIFIED: ICD-10-CM

## 2022-04-22 PROCEDURE — 71046 X-RAY EXAM CHEST 2 VIEWS: CPT

## 2022-04-22 PROCEDURE — 99214 OFFICE O/P EST MOD 30 MIN: CPT | Mod: 25

## 2022-04-22 PROCEDURE — 94010 BREATHING CAPACITY TEST: CPT

## 2022-04-22 NOTE — CONSULT LETTER
[Dear  ___] : Dear  [unfilled], [Courtesy Letter:] : I had the pleasure of seeing your patient, [unfilled], in my office today. [Please see my note below.] : Please see my note below. [Sincerely,] : Sincerely, [FreeTextEntry3] : Bharat Arguelles D.O., JYOTI\par  of Medicine\par [employer] [facility] [employer]\par

## 2022-04-22 NOTE — HISTORY OF PRESENT ILLNESS
[Never] : never [Stable] : are stable [None] : ~He/She~ has no significant interval events [Difficulty Breathing During Exertion] : denies dyspnea on exertion [Feelings Of Weakness On Exertion] : denies exercise intolerance [Cough] : denies coughing [Wheezing] : denies wheezing [Regional Soft Tissue Swelling Both Lower Extremities] : denies lower extremity edema [Chest Pain Or Discomfort] : denies chest pain [Fever] : denies fever [TextBox_4] : post hospitalization hyponatremia\par then had transfer to Rehab and was discharged home \par f/u was readmitted [facility] and told Neg COVID but had  a  virus feels still active \par [facility] added for PNDS nasal Rx\par no tx antibx\par pos SOB DODGE\par HTN  rx meds Hydralazine lisinopril Metoprolol\par \par March [employer] review of imaging studies\par March 16, 2022\par CT pelvis\par No fractures\par Lumbar disc disease\par Moderate to severe spinal stenosis\par CT cervical spine and head negative\par X-ray of hip March 14, 2022 no displaced fracture\par No chest x-ray reviewed available for review\par \par Discharge medication reviewed\par Aspirin 81 mg\par Density 200 mg\par 1 deficit\par Zofran\par Pantoprazole 40 mg\par Hydralazine 50 mg 3 times daily\par As needed Xanax\par Gabapentin 600 mg 3 times daily\par Lisinopril 20 mg twice daily\par Metoprolol 25 mg twice daily\par Was was also treated with nitrofurantoin\par \par \par completed PFIZER  COVID vaccine [Wt Gain ___ Lbs] : no recent weight gain [Wt Loss ___ Lbs] : no recent weight loss [Oxygen] : the patient uses no supplemental oxygen

## 2022-04-22 NOTE — DISCUSSION/SUMMARY
[FreeTextEntry1] : SOB DODGE\par post hyponatremia\par New small left pleural effusion rule out congestive heart failure on clinical exam crackle at right base\par decline pulmonary  physiology r/o sec to new pleural effusion\par post hyponatremia off HTCZ b/c of severity with syncope\par ceftin based on crackles at RLL\par \par "Impaction distal bronchi\par Obstructive airway disease\par Mild decline pulmonary physiology- restart Trelegy Elipita- OFF\par Other history as noted above\par Vaccination and pneumococcal profile up to date\par Advised if needs to use should notify us for evaluation\par [facility] cancer screening\par F/U H[facility] Colonoscopy up to  date\par Spinal disease  with possible epidural vs surgical procedure\par From pulmonary perspective no  absolute  contraindication to undergo spinal surgery\par PFIZER  completed\par recommended booster \par Flu  vaccine up to date\par Strongly advised to speak with cardiology ASAP for management of blood pressure\par Based historically on the severe low sodium with syncope patient is hesitant to restart any diuretic even with the noted small left pleural effusion above\par Will renew Xanax\par Advised if still not feeling well report immediately to the emergency department at [place] or [place] for reevaluation

## 2022-04-22 NOTE — PHYSICAL EXAM
[General Appearance - Well Developed] : well developed [Normal Appearance] : normal appearance [Well Groomed] : well groomed [General Appearance - Well Nourished] : well nourished [No Deformities] : no deformities [General Appearance - In No Acute Distress] : no acute distress [Normal Oropharynx] : normal oropharynx [I] : I [Neck Appearance] : the appearance of the neck was normal [Neck Cervical Mass (___cm)] : no neck mass was observed [Jugular Venous Distention Increased] : there was no jugular-venous distention [Thyroid Diffuse Enlargement] : the thyroid was not enlarged [Heart Rate And Rhythm] : heart rate and rhythm were normal [Heart Sounds] : normal S1 and S2 [Murmurs] : no murmurs present [Arterial Pulses Normal] : the arterial pulses were normal [Edema] : no peripheral edema present [Veins - Varicosity Changes] : no varicosital changes were noted in the lower extremities [Respiration, Rhythm And Depth] : normal respiratory rhythm and effort [Exaggerated Use Of Accessory Muscles For Inspiration] : no accessory muscle use [Chest Palpation] : palpation of the chest revealed no abnormalities [Lungs Percussion] : the lungs were normal to percussion [Abdomen Soft] : soft [Abdomen Tenderness] : non-tender [Abdomen Mass (___ Cm)] : no abdominal mass palpated [Abnormal Walk] : normal gait [Nail Clubbing] : no clubbing of the fingernails [Cyanosis, Localized] : no localized cyanosis [Petechial Hemorrhages (___cm)] : no petechial hemorrhages [Skin Color & Pigmentation] : normal skin color and pigmentation [] : no rash [No Venous Stasis] : no venous stasis [Skin Lesions] : no skin lesions [No Skin Ulcers] : no skin ulcer [No Xanthoma] : no  xanthoma was observed [Deep Tendon Reflexes (DTR)] : deep tendon reflexes were 2+ and symmetric [Sensation] : the sensory exam was normal to light touch and pinprick [No Focal Deficits] : no focal deficits [Oriented To Time, Place, And Person] : oriented to person, place, and time [Impaired Insight] : insight and judgment were intact [Affect] : the affect was normal [Mood] : the mood was normal [FreeTextEntry1] : varicose veins

## 2022-04-22 NOTE — PROCEDURE
[FreeTextEntry1] : Chest x-ray PA lateral\par April 22, 2022\par Blunting left costophrenic angle\par Patient's x-ray was done with overlying draw\par Difficult to assess\par No clear parenchymal infiltrate or pleural effusion\par Rule out increase shortness of breath secondary to congestive heart failure\par The left pleural effusion is new\par Compared to a chest x-ray of January 13, 2021\par \par MOUSTAPHA 4/22/22\par Mod severe OAD\par \par PFT 10/20/21\par Flow rates nl\par  minimal OAD ratio 78\par Lung Volumes nl\par  DLCO  78 %\par HGB 11.5\par \par PFT 4/15/21\par Mild  borderline  moderate  OAD\par Lung volumes normal range\par DLCO  83 %\par HGGB 10.4\par NIOX  12  Nl range  4/15/21\par \par PFT 1/13/21\par mild  mod reduction flow rates\par Moderate OAD\par Normal lung volumes\par Normal DLCO 79 % pred noted mild decline pulmonary physiology\par \par X-ray PA lateral projection January 13, 2021\par Borderline cardiomegaly\par Right lateral scoliosis\par Lung fields otherwise clear without parenchymal infiltrate pleural effusion or dominant pulmonary nodules\par Soft tissue bony structures otherwise grossly unremarkable\par Impression clear lungs no gross evidence for rib fractures or infiltrates\par No interval change compared to chest x-ray July 15, 2020\par \par Chest x-ray PA lateral July 15, 2020\par Borderline cardiomegaly\par Hyperaeration\par Clear lungs\par No parenchymal infiltrate pleural effusions or dominant pulmonary nodules\par No interval change compared to chest x-ray of December 20, 2019\par \par PFT Body BOX July 10 2020\par mild OAD\par  No BD at FEV1\par Normal lung volumes\par  sp conductance and resistance normal\par Diffusion 80 % pred normal\par  HGB 13.0 \par \par Spirometry March 9, 2020\par Mild obstructive ventilatory impairment\par No bronchodilator response at FEV1\par Stable FEV1\par \par Chest x-ray PA lateral December 20, 2019\par Normal cardiac size\par Mild right lateral scoliosis\par Suggestion of some bronchiectatic changes at right lower lung zone\par This is unchanged compared to the recent chest x-ray of December 16, 2019\par No consolidation consistent with active pneumonia\par \par States Flu vaccination up-to-date\par History of pneumococcal vaccination and Prevnar administered\par \par PFT December 16, 2019\par Mild obstructive ventilatory impairment\par No response to bronchodilator at the FEV1\par Lung volumes are normal with total capacity 95% predicted.\par Diffusion relatively normal 78% of predicted.\par Hemoglobin 13.7\par \par Chest x-ray PA lateral December 16 2019\par Mild cardiomegaly\par Left lower lung zone minimal discoid linear atelectatic change\par No parenchymal infiltrates pleural effusions dominant pulmonary nodules\par Soft tissue bony structures grossly normal\par Impression no evidence of pneumonia\par \par Data review 12/21/2019\par Serum sodium 134 with serum chloride 93 normal renal function\par CBC White blood count 7.37 hemoglobin 14.3 hematocrit 43.4\par Procalcitonin negative\par RVP positive influenza A\par \par CT chest December 26, 2019\par Right mid lower lung zone tree-in-bud with centrilobular groundglass nodular opacities most consistent with impacted distal airways\par \par Blood draw

## 2022-04-23 LAB
RAPID RVP RESULT: NOT DETECTED
SARS-COV-2 RNA PNL RESP NAA+PROBE: NOT DETECTED

## 2022-04-25 ENCOUNTER — NON-APPOINTMENT (OUTPATIENT)
Age: 82
End: 2022-04-25

## 2022-05-02 ENCOUNTER — NON-APPOINTMENT (OUTPATIENT)
Age: 82
End: 2022-05-02

## 2022-05-02 ENCOUNTER — APPOINTMENT (OUTPATIENT)
Dept: UROGYNECOLOGY | Facility: CLINIC | Age: 82
End: 2022-05-02
Payer: MEDICARE

## 2022-05-02 DIAGNOSIS — R30.0 DYSURIA: ICD-10-CM

## 2022-05-02 DIAGNOSIS — R35.0 FREQUENCY OF MICTURITION: ICD-10-CM

## 2022-05-02 LAB
BILIRUB UR QL STRIP: NORMAL
CLARITY UR: NORMAL
COLLECTION METHOD: NORMAL
GLUCOSE UR-MCNC: NORMAL
HCG UR QL: 0.2 EU/DL
HGB UR QL STRIP.AUTO: NORMAL
KETONES UR-MCNC: NORMAL
LEUKOCYTE ESTERASE UR QL STRIP: NORMAL
NITRITE UR QL STRIP: POSITIVE
PH UR STRIP: 6
PROT UR STRIP-MCNC: NORMAL
SP GR UR STRIP: 1.01

## 2022-05-02 PROCEDURE — 99214 OFFICE O/P EST MOD 30 MIN: CPT | Mod: 25

## 2022-05-02 PROCEDURE — 51701 INSERT BLADDER CATHETER: CPT

## 2022-05-02 NOTE — DISCUSSION/SUMMARY
[FreeTextEntry1] : \par rUTIs, atrophy:\par -Udip with large LE, will send culture. Start empiric treatment with Bactrim DS x 3 days\par -s/p daily ppx with Nitrofurantoin 50 mg QHS x 6 months\par -Continue low dose vaginal estrogen cream biw\par -Daily Probiotic\par -Daily Cranberry tablets, recommend Ellura. Rx provided.  \par -If UTI symptoms, try Cystex OTC first. If symptoms persist, must obtain urine culture prior to starting treatment\par -Continue to f/u with [facility] for annual cystoscopy\par RTO in 6-12 mo for follow up, or sooner if issues arise. \par \par

## 2022-05-02 NOTE — PROCEDURE
[Straight Catheterization] : insertion of a straight catheter [Urinary Tract Infection] : a urinary tract infection [Patient] : the patient [Allergies Reviewed] : Allergies reviewed [___ Fr Straight Tip] : a [unfilled] in [place] straight tip catheter [None] : there were no complications with the catheter insertion [Clear] : clear [Culture] : culture [No Complications] : no complications [Tolerated Well] : the patient tolerated the procedure well [Post procedure instructions and information given] : Post procedure instructions and information were given and reviewed with patient. [0] : 0 [FreeTextEntry1] : PVR 30 cc\par

## 2022-05-02 NOTE — HISTORY OF PRESENT ILLNESS
[FreeTextEntry1] : \par Skylar presents for follow up of rUTIs. For ppx she is using low dose vaginal estrogen and is now s/p 6 month course of Nitrofurantoin 50 mg QHS, which she completed 1 week ago. She denies any UTIs while on abx ppx however reports some dysuria and frequency which started 3 days ago.\par \par Today she reports she is seen at [facility] once a year for cystoscopy after being diagnosed with a precancerous lesion on her bladder >20 years ago. Her last cysto was 09/21 and negative as per patient.

## 2022-05-04 ENCOUNTER — NON-APPOINTMENT (OUTPATIENT)
Age: 82
End: 2022-05-04

## 2022-05-06 ENCOUNTER — NON-APPOINTMENT (OUTPATIENT)
Age: 82
End: 2022-05-06

## 2022-05-06 ENCOUNTER — APPOINTMENT (OUTPATIENT)
Dept: PULMONOLOGY | Facility: CLINIC | Age: 82
End: 2022-05-06
Payer: MEDICARE

## 2022-05-06 VITALS
HEART RATE: 88 BPM | BODY MASS INDEX: 23.73 KG/M2 | HEIGHT: 64 IN | WEIGHT: 139 LBS | SYSTOLIC BLOOD PRESSURE: 189 MMHG | OXYGEN SATURATION: 96 % | TEMPERATURE: 97.2 F | DIASTOLIC BLOOD PRESSURE: 72 MMHG

## 2022-05-06 VITALS — SYSTOLIC BLOOD PRESSURE: 168 MMHG | DIASTOLIC BLOOD PRESSURE: 81 MMHG

## 2022-05-06 DIAGNOSIS — N39.0 URINARY TRACT INFECTION, SITE NOT SPECIFIED: ICD-10-CM

## 2022-05-06 LAB
ANION GAP SERPL CALC-SCNC: 14 MMOL/L
BUN SERPL-MCNC: 13 MG/DL
CALCIUM SERPL-MCNC: 9.5 MG/DL
CHLORIDE SERPL-SCNC: 100 MMOL/L
CO2 SERPL-SCNC: 22 MMOL/L
CREAT SERPL-MCNC: 0.54 MG/DL
EGFR: 92 ML/MIN/1.73M2
GLUCOSE SERPL-MCNC: 85 MG/DL
POTASSIUM SERPL-SCNC: 4.6 MMOL/L
SODIUM SERPL-SCNC: 135 MMOL/L

## 2022-05-06 PROCEDURE — 36415 COLL VENOUS BLD VENIPUNCTURE: CPT

## 2022-05-06 PROCEDURE — 99214 OFFICE O/P EST MOD 30 MIN: CPT | Mod: 25

## 2022-05-06 PROCEDURE — 71046 X-RAY EXAM CHEST 2 VIEWS: CPT

## 2022-05-06 NOTE — PHYSICAL EXAM
[General Appearance - Well Developed] : well developed [Normal Appearance] : normal appearance [Well Groomed] : well groomed [General Appearance - Well Nourished] : well nourished [No Deformities] : no deformities [General Appearance - In No Acute Distress] : no acute distress [Normal Oropharynx] : normal oropharynx [I] : I [Neck Appearance] : the appearance of the neck was normal [Neck Cervical Mass (___cm)] : no neck mass was observed [Thyroid Diffuse Enlargement] : the thyroid was not enlarged [Jugular Venous Distention Increased] : there was no jugular-venous distention [Heart Rate And Rhythm] : heart rate and rhythm were normal [Heart Sounds] : normal S1 and S2 [Murmurs] : no murmurs present [Arterial Pulses Normal] : the arterial pulses were normal [Edema] : no peripheral edema present [Veins - Varicosity Changes] : no varicosital changes were noted in the lower extremities [Respiration, Rhythm And Depth] : normal respiratory rhythm and effort [Exaggerated Use Of Accessory Muscles For Inspiration] : no accessory muscle use [Chest Palpation] : palpation of the chest revealed no abnormalities [Lungs Percussion] : the lungs were normal to percussion [Abdomen Soft] : soft [Abdomen Tenderness] : non-tender [Abdomen Mass (___ Cm)] : no abdominal mass palpated [Abnormal Walk] : normal gait [Nail Clubbing] : no clubbing of the fingernails [Cyanosis, Localized] : no localized cyanosis [Petechial Hemorrhages (___cm)] : no petechial hemorrhages [Skin Color & Pigmentation] : normal skin color and pigmentation [] : no rash [No Venous Stasis] : no venous stasis [Skin Lesions] : no skin lesions [No Skin Ulcers] : no skin ulcer [No Xanthoma] : no  xanthoma was observed [Deep Tendon Reflexes (DTR)] : deep tendon reflexes were 2+ and symmetric [Sensation] : the sensory exam was normal to light touch and pinprick [No Focal Deficits] : no focal deficits [Oriented To Time, Place, And Person] : oriented to person, place, and time [Impaired Insight] : insight and judgment were intact [Affect] : the affect was normal [Mood] : the mood was normal [FreeTextEntry1] : varicose veins

## 2022-05-06 NOTE — HISTORY OF PRESENT ILLNESS
[Never] : never [Stable] : are stable [None] : ~He/She~ has no significant interval events [Difficulty Breathing During Exertion] : denies dyspnea on exertion [Feelings Of Weakness On Exertion] : denies exercise intolerance [Cough] : denies coughing [Wheezing] : denies wheezing [Regional Soft Tissue Swelling Both Lower Extremities] : denies lower extremity edema [Chest Pain Or Discomfort] : denies chest pain [Fever] : denies fever [TextBox_4] : post hospitalization hyponatremia\par then had transfer to Rehab and was discharged home \par f/u was readmitted [facility] and told Neg COVID but had  a  virus feels still active \par [place] [facility] added for PNDS nasal Rx\par no tx antibx\par pos SOB DODGE\par HTN  rx meds Hydralazine lisinopril Metoprolol\par \par March [facility] review of imaging studies\par March 16, 2022\par CT pelvis\par No fractures\par Lumbar disc disease\par Moderate to severe spinal stenosis\par CT cervical spine and head negative\par X-ray of hip March 14, 2022 no displaced fracture\par No chest x-ray reviewed available for review\par \par Discharge medication reviewed\par Aspirin 81 mg\par Density 200 mg\par 1 deficit\par Zofran\par Pantoprazole 40 mg\par Hydralazine 50 mg 3 times daily\par As needed Xanax\par Gabapentin 600 mg 3 times daily\par Lisinopril 20 mg twice daily\par Metoprolol 25 mg twice daily\par Was was also treated with nitrofurantoin\par \par \par completed PFIZER  COVID vaccine [Wt Gain ___ Lbs] : no recent weight gain [Wt Loss ___ Lbs] : no recent weight loss [Oxygen] : the patient uses no supplemental oxygen

## 2022-05-06 NOTE — PROCEDURE
[FreeTextEntry1] : Chest x-ray PA lateral May 6, 2022\par Cardiomegaly\par Right lung is clear\par No evidence for a right pleural effusion\par Left costophrenic angle blunted with some component of atelectasis\par Comparison to chest x-ray of April 22, 2022 which demonstrated a very minimal scar atelectatic changes with possible left pleural effusion although differences in technique does suggest some level of improvement clinical correlate with exam with no crackles or auscultated at today's visit\par \par Chest x-ray PA lateral\par April 22, 2022\par Blunting left costophrenic angle\par Patient's x-ray was done with overlying draw\par Difficult to assess\par No clear parenchymal infiltrate or pleural effusion\par Rule out increase shortness of breath secondary to congestive heart failure\par The left pleural effusion is new\par Compared to a chest x-ray of January 13, 2021\par \par MOUSTAPHA 4/22/22\par Mod severe OAD\par \par PFT 10/20/21\par Flow rates nl\par  minimal OAD ratio 78\par Lung Volumes nl\par  DLCO  78 %\par HGB 11.5\par \par PFT 4/15/21\par Mild  borderline  moderate  OAD\par Lung volumes normal range\par DLCO  83 %\par HGGB 10.4\par NIOX  12  Nl range  4/15/21\par \par PFT 1/13/21\par mild  mod reduction flow rates\par Moderate OAD\par Normal lung volumes\par Normal DLCO 79 % pred noted mild decline pulmonary physiology\par \par X-ray PA lateral projection January 13, 2021\par Borderline cardiomegaly\par Right lateral scoliosis\par Lung fields otherwise clear without parenchymal infiltrate pleural effusion or dominant pulmonary nodules\par Soft tissue bony structures otherwise grossly unremarkable\par Impression clear lungs no gross evidence for rib fractures or infiltrates\par No interval change compared to chest x-ray July 15, 2020\par \par Chest x-ray PA lateral July 15, 2020\par Borderline cardiomegaly\par Hyperaeration\par Clear lungs\par No parenchymal infiltrate pleural effusions or dominant pulmonary nodules\par No interval change compared to chest x-ray of December 20, 2019\par \par PFT Body BOX July 10 2020\par mild OAD\par  No BD at FEV1\par Normal lung volumes\par  sp conductance and resistance normal\par Diffusion 80 % pred normal\par  HGB 13.0 \par \par Spirometry March 9, 2020\par Mild obstructive ventilatory impairment\par No bronchodilator response at FEV1\par Stable FEV1\par \par Chest x-ray PA lateral December 20, 2019\par Normal cardiac size\par Mild right lateral scoliosis\par Suggestion of some bronchiectatic changes at right lower lung zone\par This is unchanged compared to the recent chest x-ray of December 16, 2019\par No consolidation consistent with active pneumonia\par \par States Flu vaccination up-to-date\par History of pneumococcal vaccination and Prevnar administered\par \par PFT December 16, 2019\par Mild obstructive ventilatory impairment\par No response to bronchodilator at the FEV1\par Lung volumes are normal with total capacity 95% predicted.\par Diffusion relatively normal 78% of predicted.\par Hemoglobin 13.7\par \par Chest x-ray PA lateral December 16 2019\par Mild cardiomegaly\par Left lower lung zone minimal discoid linear atelectatic change\par No parenchymal infiltrates pleural effusions dominant pulmonary nodules\par Soft tissue bony structures grossly normal\par Impression no evidence of pneumonia\par \par Data review 12/21/2019\par Serum sodium 134 with serum chloride 93 normal renal function\par CBC White blood count 7.37 hemoglobin 14.3 hematocrit 43.4\par Procalcitonin negative\par RVP positive influenza A\par \par CT chest December 26, 2019\par Right mid lower lung zone tree-in-bud with centrilobular groundglass nodular opacities most consistent with impacted distal airways\par \par Blood draw

## 2022-05-06 NOTE — CONSULT LETTER
[Dear  ___] : Dear  [unfilled], [Courtesy Letter:] : I had the pleasure of seeing your patient, [unfilled], in my office today. [Please see my note below.] : Please see my note below. [Sincerely,] : Sincerely, [FreeTextEntry3] : Bharat Arguelles D.O., JYOTI\par  of Medicine\par [facility] [employer]\par

## 2022-05-06 NOTE — DISCUSSION/SUMMARY
[FreeTextEntry1] : SOB DODGE\par post hyponatremia\par New small left pleural effusion rule out congestive heart failure on clinical exam crackle at right base\par decline pulmonary  physiology r/o sec to new pleural effusion\par post hyponatremia off HTCZ b/c of severity with syncope\par ceftin based on crackles at RLL\par \par "Impaction distal bronchi\par Obstructive airway disease\par Mild decline pulmonary physiology- restart Trelegy Elipita- OFF\par Other history as noted above\par Vaccination and pneumococcal profile up to date\par Advised if needs to use should notify us for evaluation\par [facility] cancer screening\par F/U H[facility] Colonoscopy up to  date\par Spinal disease  with possible epidural vs surgical procedure\par PFIZER  completed X 3\par Flu  vaccine up to date\par Strongly advised to speak with cardiology ASAP for management of blood pressure\par Based historically on the severe low sodium with syncope patient is hesitant to restart any diuretic even with the noted small left pleural effusion above\par Will renew Xanax\par Advised if still not feeling well report immediately to the emergency department at [place] or [place] for reevaluation\par Discussed with PMD\par Patient remains clinically improved with holding of diuretic\par Follow-up PFT 1 month with chest x-ray\par Serum electrolytes pending

## 2022-05-13 RX ORDER — ALBUTEROL SULFATE 90 UG/1
108 (90 BASE) INHALANT RESPIRATORY (INHALATION)
Qty: 1 | Refills: 3 | Status: ACTIVE | COMMUNITY
Start: 2018-11-05 | End: 1900-01-01

## 2022-05-14 ENCOUNTER — RX RENEWAL (OUTPATIENT)
Age: 82
End: 2022-05-14

## 2022-06-08 ENCOUNTER — APPOINTMENT (OUTPATIENT)
Dept: PULMONOLOGY | Facility: CLINIC | Age: 82
End: 2022-06-08
Payer: MEDICARE

## 2022-06-08 VITALS — SYSTOLIC BLOOD PRESSURE: 129 MMHG | HEART RATE: 57 BPM | OXYGEN SATURATION: 95 % | DIASTOLIC BLOOD PRESSURE: 67 MMHG

## 2022-06-08 LAB — POCT - HEMOGLOBIN (HGB), QUANTITATIVE, TRANSCUTANEOUS: 12

## 2022-06-08 PROCEDURE — 88738 HGB QUANT TRANSCUTANEOUS: CPT

## 2022-06-08 PROCEDURE — 94727 GAS DIL/WSHOT DETER LNG VOL: CPT

## 2022-06-08 PROCEDURE — 71046 X-RAY EXAM CHEST 2 VIEWS: CPT

## 2022-06-08 PROCEDURE — ZZZZZ: CPT

## 2022-06-08 PROCEDURE — 94060 EVALUATION OF WHEEZING: CPT

## 2022-06-08 PROCEDURE — 94618 PULMONARY STRESS TESTING: CPT

## 2022-06-08 PROCEDURE — 99214 OFFICE O/P EST MOD 30 MIN: CPT | Mod: CS,25

## 2022-06-08 PROCEDURE — 94729 DIFFUSING CAPACITY: CPT

## 2022-06-08 NOTE — PROCEDURE
[FreeTextEntry1] : PFT June 8, 2022\par Mild obstructive ventilatory impairment\par Significant 25% response to bronchodilator at the FEV1\par Lung volumes normal\par Total lung capacity 89% predicted\par Diffusion low normal range 70% predicted\par Hemoglobin 10.7\par Data comparison to April 22, 2022 demonstrates stable FVC with just greater than 200 cc interval improvement at the FEV1\par \par Chest x-ray PA lateral\par Borderline cardiomegaly\par Right lateral scoliosis\par Lung fields are clear\par No evidence of cephalization or congestion\par No interstitial changes\par Costophrenic angles are clear\par No recurrence of pleural effusion\par \par Pulmonary exercise study 6/8/22\par RA study\par  neg desaturation\par \par Chest x-ray PA lateral May 6, 2022\par Cardiomegaly\par Right lung is clear\par No evidence for a right pleural effusion\par Left costophrenic angle blunted with some component of atelectasis\par Comparison to chest x-ray of April 22, 2022 which demonstrated a very minimal scar atelectatic changes with possible left pleural effusion although differences in technique does suggest some level of improvement clinical correlate with exam with no crackles or auscultated at today's visit\par \par Chest x-ray PA lateral\par April 22, 2022\par Blunting left costophrenic angle\par Patient's x-ray was done with overlying draw\par Difficult to assess\par No clear parenchymal infiltrate or pleural effusion\par Rule out increase shortness of breath secondary to congestive heart failure\par The left pleural effusion is new\par Compared to a chest x-ray of January 13, 2021\par \par MOUSTAPHA 4/22/22\par Mod severe OAD\par \par PFT 10/20/21\par Flow rates nl\par  minimal OAD ratio 78\par Lung Volumes nl\par  DLCO  78 %\par HGB 11.5\par \par PFT 4/15/21\par Mild  borderline  moderate  OAD\par Lung volumes normal range\par DLCO  83 %\par HGGB 10.4\par NIOX  12  Nl range  4/15/21\par \par PFT 1/13/21\par mild  mod reduction flow rates\par Moderate OAD\par Normal lung volumes\par Normal DLCO 79 % pred noted mild decline pulmonary physiology\par \par X-ray PA lateral projection January 13, 2021\par Borderline cardiomegaly\par Right lateral scoliosis\par Lung fields otherwise clear without parenchymal infiltrate pleural effusion or dominant pulmonary nodules\par Soft tissue bony structures otherwise grossly unremarkable\par Impression clear lungs no gross evidence for rib fractures or infiltrates\par No interval change compared to chest x-ray July 15, 2020\par \par Chest x-ray PA lateral July 15, 2020\par Borderline cardiomegaly\par Hyperaeration\par Clear lungs\par No parenchymal infiltrate pleural effusions or dominant pulmonary nodules\par No interval change compared to chest x-ray of December 20, 2019\par \par PFT Body BOX July 10 2020\par mild OAD\par  No BD at FEV1\par Normal lung volumes\par  sp conductance and resistance normal\par Diffusion 80 % pred normal\par  HGB 13.0 \par \par Spirometry March 9, 2020\par Mild obstructive ventilatory impairment\par No bronchodilator response at FEV1\par Stable FEV1\par \par Chest x-ray PA lateral December 20, 2019\par Normal cardiac size\par Mild right lateral scoliosis\par Suggestion of some bronchiectatic changes at right lower lung zone\par This is unchanged compared to the recent chest x-ray of December 16, 2019\par No consolidation consistent with active pneumonia\par \par States Flu vaccination up-to-date\par History of pneumococcal vaccination and Prevnar administered\par \par PFT December 16, 2019\par Mild obstructive ventilatory impairment\par No response to bronchodilator at the FEV1\par Lung volumes are normal with total capacity 95% predicted.\par Diffusion relatively normal 78% of predicted.\par Hemoglobin 13.7\par \par Chest x-ray PA lateral December 16 2019\par Mild cardiomegaly\par Left lower lung zone minimal discoid linear atelectatic change\par No parenchymal infiltrates pleural effusions dominant pulmonary nodules\par Soft tissue bony structures grossly normal\par Impression no evidence of pneumonia\par \par Data review 12/21/2019\par Serum sodium 134 with serum chloride 93 normal renal function\par CBC White blood count 7.37 hemoglobin 14.3 hematocrit 43.4\par Procalcitonin negative\par RVP positive influenza A\par \par CT chest December 26, 2019\par Right mid lower lung zone tree-in-bud with centrilobular groundglass nodular opacities most consistent with impacted distal airways\par \par Blood draw

## 2022-06-08 NOTE — DISCUSSION/SUMMARY
[FreeTextEntry1] : SOB DODGE\par post hyponatremia\par per cardiology on daily lasix 40 mg\par \par "Impaction distal bronchi\par Obstructive airway disease\par Mild decline pulmonary physiology- restart Trelegy Elipita- OFF\par Other history as noted above\par Vaccination and pneumococcal profile up to date\par Advised if needs to use should notify us for evaluation\par [facility] cancer screening\par F/U [facility] Colonoscopy up to  date\par Spinal disease  with possible epidural vs surgical procedure\par PFIZER  completed X 3\par Flu  vaccine up to date\par Strongly advised to speak with cardiology ASAP for management of blood pressure\par Based historically on the severe low sodium with syncope patient is hesitant to restart any diuretic even with the noted small left pleural effusion above\par Will renew Xanax\par Advised if still not feeling well report immediately to the emergency department at [place] or [place] for reevaluation\par Discussed with PMD\par Patient remains clinically improved with holding of diuretic\par Follow-up PFT 1 month with chest x-ray\par Serum electrolytes pending

## 2022-06-08 NOTE — HISTORY OF PRESENT ILLNESS
[Never] : never [Stable] : are stable [None] : ~He/She~ has no significant interval events [Difficulty Breathing During Exertion] : denies dyspnea on exertion [Feelings Of Weakness On Exertion] : denies exercise intolerance [Cough] : denies coughing [Wheezing] : denies wheezing [Regional Soft Tissue Swelling Both Lower Extremities] : denies lower extremity edema [Chest Pain Or Discomfort] : denies chest pain [Fever] : denies fever [TextBox_4] : post hospitalization hyponatremia\par then had transfer to Rehab and was discharged home \par f/u was readmitted [facility] and told Neg COVID but had  a  virus feels still active \par [place] MD added for PNDS nasal Rx\par no tx antibx\par pos SOB DODGE\par HTN  rx meds Hydralazine lisinopril Metoprolol\par \par March [facility] review of imaging studies\par March 16, 2022\par CT pelvis\par No fractures\par Lumbar disc disease\par Moderate to severe spinal stenosis\par CT cervical spine and head negative\par X-ray of hip March 14, 2022 no displaced fracture\par No chest x-ray reviewed available for review\par \par Discharge medication reviewed\par Aspirin 81 mg\par Density 200 mg\par 1 deficit\par Zofran\par Pantoprazole 40 mg\par Hydralazine 50 mg 3 times daily\par As needed Xanax\par Gabapentin 600 mg 3 times daily\par Lisinopril 20 mg twice daily\par Metoprolol 25 mg twice daily\par Was was also treated with nitrofurantoin\par \par \par completed PFIZER  COVID vaccine [Wt Gain ___ Lbs] : no recent weight gain [Wt Loss ___ Lbs] : no recent weight loss [Oxygen] : the patient uses no supplemental oxygen

## 2022-06-08 NOTE — CONSULT LETTER
[Dear  ___] : Dear  [unfilled], [Courtesy Letter:] : I had the pleasure of seeing your patient, [unfilled], in my office today. [Please see my note below.] : Please see my note below. [Sincerely,] : Sincerely, [FreeTextEntry3] : Bharat Arguelles D.O., JYOTI\par  of Medicine\par [facility] [employer]\par

## 2022-06-09 ENCOUNTER — NON-APPOINTMENT (OUTPATIENT)
Age: 82
End: 2022-06-09

## 2022-06-09 LAB
ANION GAP SERPL CALC-SCNC: 13 MMOL/L
BUN SERPL-MCNC: 11 MG/DL
CALCIUM SERPL-MCNC: 9.8 MG/DL
CHLORIDE SERPL-SCNC: 100 MMOL/L
CO2 SERPL-SCNC: 22 MMOL/L
CREAT SERPL-MCNC: 0.63 MG/DL
EGFR: 89 ML/MIN/1.73M2
GLUCOSE SERPL-MCNC: 86 MG/DL
POTASSIUM SERPL-SCNC: 4.6 MMOL/L
SODIUM SERPL-SCNC: 135 MMOL/L

## 2022-07-13 ENCOUNTER — RESULT REVIEW (OUTPATIENT)
Age: 82
End: 2022-07-13

## 2022-08-06 NOTE — ED ADULT NURSE NOTE - NS ED NURSE IV DC DT
History of Present Illness


Consult date: 08/06/22


Chief complaint: Generalized weakness


History of present illness: 





The patient is a pleasant 89-year-old gentleman with a past medical history sig

nificant for underlying dementia as well as permanent atrial fibrillation 

maintaining oral anticoagulation as well as multiple comorbid conditions who was

admitted to the hospital initially with urosepsis.  Initially the patient was 

admitted to the fourth floor.  He is known to have permanent atrial 

fibrillation.  Earlier today it was noticed that his heart rate was going up and

he was in atrial fibrillation with a heart rate around 120 beats per minutes.  

For that reason the patient was brought to the telemetry unit.  He is a poor 

historian and known to have underlying dementia.  No indication that he was 

experiencing any chest pain or chest discomfort or any shortness of breath.  As 

a matter of fact he seems to be euvolemic on examination.  He was started on 

Cardizem IV.  He was also on beta blocker which has increased a earlier today.  

He is also on oral anticoagulation.  I am going to DC the Cardizem and start the

patient on amiodarone IV and subsequently switching to amiodarone by mouth and 

also try to increase the dose of beta blocker within the next 24-48 hours.  

Continue oral anticoagulation.  On examination also the patient seems to be 

somewhat dehydrated.  And that could be contributing to his atrial fibrillation 

with RVR in addition of course to the urosepsis.  The patient is known to our 

service before.  He was admitted in 2020 was A. fib with RVR and at that point 

he underwent an echo which revealed normal left ventricular systolic function 

was no significant valvular abnormalities.  His blood work and workup was 

reviewed.  His hemoglobin is stable.  His GFR is about 60.





Past Medical History


Past Medical History: Atrial Fibrillation, Asthma, Cancer, CVA/TIA, GI Bleed, 

Prostate Disorder, Rheumatoid Arthritis (RA)


Additional Past Medical History / Comment(s): chronic back problems, Rt foot urbano

p, Uses a walker, prostate cancer, radiation seeds; suspected cva 2017; covid; 

strictures


History of Any Multi-Drug Resistant Organisms: ESBL


Date of last positivie culture/infection: 01/01/2020


MDRO Source:: URINE ESBL


Past Surgical History: Back Surgery


Additional Past Surgical History / Comment(s): RECENT PICC LINE, NOW REMOVED, 

EVA CATARACTS, R hand surgery; laminectomy


Past Anesthesia/Blood Transfusion Reactions: No Reported Reaction


Past Psychological History: No Psychological Hx Reported


Smoking Status: Never smoker


Past Alcohol Use History: None Reported


Past Drug Use History: None Reported





- Past Family History


  ** Father


Family Medical History: No Reported History





  ** Mother


Family Medical History: GI Bleed





Medications and Allergies


                                Home Medications











 Medication  Instructions  Recorded  Confirmed  Type


 


Folic Acid 1 mg PO DAILY@1800 08/16/17 08/02/22 History


 


ALPRAZolam [Xanax] 0.5 mg PO BID PRN 08/09/20 08/02/22 History


 


Colloidal Oatmeal [Eucerin Eczema 1 applic TOPICAL BID 08/09/20 08/02/22 History





Relief]    


 


Cyanocobalamin [Vitamin B-12] 500 mcg PO DAILY@0800 08/09/20 08/02/22 History


 


Donepezil [Aricept] 10 mg PO DAILY@0800 08/09/20 08/02/22 History


 


Ipratropium-Albuterol Nebulize 3 ml INHALATION RT-QID PRN 08/09/20 08/02/22 

History





[Duoneb 0.5 mg-3 mg/3 ml Soln]    


 


Metoprolol Tartrate [Lopressor] 12.5 mg PO BID@0800,1800 08/09/20 08/02/22 

History


 


Pantoprazole [Protonix] 40 mg PO DAILY@0800 08/09/20 08/02/22 History


 


QUEtiapine [SEROquel] 50 mg PO HS@1800 08/09/20 08/02/22 History


 


Acetaminophen Tab [Tylenol] 650 mg PO Q4H PRN 08/02/22 08/02/22 History


 


Apixaban [Eliquis] 2.5 mg PO BID@0800,1800 08/02/22 08/02/22 History


 


Ascorbic Acid [Vitamin C] 500 mg PO BID@0800,1800 08/02/22 08/02/22 History


 


Cranberry Fruit Extract [Cranberry] 500 mg PO DAILY@0800 08/02/22 08/02/22 

History


 


Fluticasone/Vilanterol [Breo 1 puff INHALATION RT-DAILY@0800 08/02/22 08/02/22 

History





Ellipta 100-25 Mcg Inhaler]    


 


Ipratropium-Albuterol Nebulize 3 ml INHALATION RT-Q3H PRN 08/02/22 08/02/22 

History





[Duoneb 0.5 mg-3 mg/3 ml Soln]    


 


Kaolin Pectin 30 ml PO DAILY PRN 08/02/22 08/02/22 History


 


Mag Hydrox/Aluminum Hyd/Simeth 15 ml PO BID PRN 08/02/22 08/02/22 History





[Mylanta Maximum Strength Liq]    


 


Magnesium Hydroxide [Milk of 2,400 mg PO HS PRN 08/02/22 08/02/22 History





Magnesia]    


 


Mirtazapine [Remeron] 30 mg PO HS@1800 08/02/22 08/02/22 History


 


Pravastatin Sodium [Pravachol] 40 mg PO HS@1800 08/02/22 08/02/22 History


 


Zinc Sulfate [Orazinc] 220 mg PO DAILY@0800 08/02/22 08/02/22 History


 


guaiFENesin SYRUP 100MG/5ML 1 dose PO Q6H PRN 08/02/22 08/02/22 History





[Robitussin]    








                                    Allergies











Allergy/AdvReac Type Severity Reaction Status Date / Time


 


No Known Allergies Allergy   Verified 08/02/22 07:20














Physical Exam


Vitals: 


                                   Vital Signs











  Temp Pulse Pulse Pulse Resp BP Pulse Ox


 


 08/06/22 11:50   116 H     


 


 08/06/22 11:47  99.0 F   118 H   18  118/62  98


 


 08/06/22 11:40   116 H     


 


 08/06/22 08:48  98.4 F   128 H   16  125/56  97


 


 08/06/22 08:09   112 H     


 


 08/06/22 07:59   116 H     


 


 08/06/22 02:00  98.9 F    87  18  132/54  97


 


 08/05/22 20:26   80     


 


 08/05/22 20:16   77     


 


 08/05/22 20:00  98.7 F   80  87  18  115/70  95








                                Intake and Output











 08/06/22 08/06/22 08/06/22





 06:59 14:59 22:59


 


Intake Total  16.5 


 


Output Total 1200  


 


Balance -1200 16.5 


 


Intake:   


 


  Intake, IV Titration  16.5 





  Amount   


 


    Diltiazem 125 mg In  16.5 





    Sodium Chloride 0.9% 100   





    ml @ Per Protocol IV .Q0M   





    [facility] Rx#:[id number]   


 


Output:   


 


  Urine 1200  


 


Other:   


 


  Voiding Method  Indwelling Catheter 














- Constitutional


General appearance: no acute distress





- Respiratory


Respiratory: bilateral: diminished





- Cardiovascular


Rhythm: irregularly irregular


Heart sounds: normal: S1, S2





Results





                                 08/06/22 12:18





                                 08/06/22 12:18


                                       CBC











  08/06/22 08/06/22 Range/Units





  07:23 12:18 


 


WBC  13.14 H  12.9 H  (4.50-10.00)  X 10*3/uL


 


RBC  3.82 L  3.56 L  (4.40-5.60)  X 10*6/uL


 


Hgb  11.0 L  11.4 L  (13.0-17.0)  g/dL


 


Hct  33.5 L  32.1 L  (39.6-50.0)  %


 


Plt Count  378  361  (140-440)  X 10*3/uL








                          Comprehensive Metabolic Panel











  08/06/22 08/06/22 Range/Units





  07:23 12:18 


 


Sodium  137  135 L  (135-145)  mmol/L


 


Potassium  4.0  4.1  (3.5-5.5)  mmol/L


 


Chloride  103  107  ()  mmol/L


 


Carbon Dioxide  23.3  22  (20.0-27.5)  mmol/L


 


BUN  14.6  16  (9.0-27.0)  mg/dL


 


Creatinine  0.5 L  0.57 L  (0.6-1.5)  mg/dL


 


Glucose  96  108 H  ()  mg/dL


 


Calcium  8.3 L  8.2 L  (8.7-10.3)  mg/dL








                               Current Medications











Generic Name Dose Route Start Last Admin





  Trade Name Freq  PRN Reason Stop Dose Admin


 


Acetaminophen  650 mg  08/03/22 19:36  08/03/22 20:02





  Acetaminophen Tab 325 Mg Tab  PO   650 mg





  Q4HR PRN   Administration





  Fever and/ or Pain  


 


Albuterol/Ipratropium  3 ml  08/03/22 15:59 





  Ipratropium-Albuterol 3 Ml Neb  INHALATION  





  RT-Q2H PRN  





  Shortness Of Breath Or Wheezing  


 


Albuterol/Ipratropium  3 ml  08/06/22 11:51  08/06/22 15:47





  Ipratropium-Albuterol 3 Ml Neb  INHALATION   3 ml





  RT-QID PRN   Administration





  Shortness Of Breath Or Wheezing  


 


Alprazolam  0.5 mg  08/02/22 10:10  08/02/22 18:21





  Alprazolam 0.5 Mg Tab  PO   0.5 mg





  BID PRN   Administration





  Anxiety  


 


Apixaban  2.5 mg  08/02/22 18:00  08/06/22 08:32





  Apixaban 2.5 Mg Tablet  PO   2.5 mg





  BID@0800,1800 SRIDHAR   Administration





  Protocol  


 


Budesonide/Formoterol Fumarate  2 puff  08/02/22 11:00  08/06/22 07:59





  Symbicort 80-4.5 Mcg Inhaler  INHALATION   2 puff





  RT-BID SRIDHAR   Administration


 


Donepezil HCl  10 mg  08/02/22 08:00  08/06/22 08:32





  Donepezil 10 Mg Tab  PO   10 mg





  DAILY@0800 SRIDHAR   Administration


 


Folic Acid  1 mg  08/02/22 18:00  08/05/22 18:27





  Folic Acid 1 Mg Tab  PO   1 mg





  DAILY@1800 SRIDHAR   Administration


 


Ertapenem 1 gm/ Sodium  50 mls @ 100 mls/hr  08/03/22 20:15  08/05/22 20:36





  Chloride  IVPB   100 mls/hr





  DAILY@2000 SRIDHAR   Administration





  Protocol  


 


Sodium Chloride  1,000 mls @ 60 mls/hr  08/03/22 22:00  08/06/22 08:29





  Saline 0.9%  IV   60 mls/hr





  .M94Q50G SRIDHAR   Administration


 


Amiodarone HCl 360 mg/  200 mls @ 33.333 mls/hr  08/06/22 16:08 





  Dextrose/Water  IV  08/06/22 22:07 





  .Q6H ONE  





  Protocol  





  1 MG/MIN  


 


Amiodarone HCl 450 mg/  250 mls @ 16.667 mls/hr  08/06/22 22:15 





  Dextrose/Water  IV  08/07/22 16:14 





  .Q15H SRIDHAR  





  Protocol  





  0.5 MG/MIN  


 


Lactobacillus Acidoph/Bulgaricus  1 each  08/03/22 09:00  08/06/22 08:32





  Lactobacillus Acidoph & Bulgar 1 Each Packet  PO   1 each





  DAILY SRIDHAR   Administration


 


Magnesium Hydroxide  2,400 mg  08/02/22 10:10 





  Magnesium Hydroxide 2,400 Mg/10 Ml Cup  PO  





  HS PRN  





  Constipation  


 


Metoprolol Tartrate  25 mg  08/06/22 21:00 





  Metoprolol Tartrate 25 Mg Tab  PO  





  BID SRIDHAR  


 


Mirtazapine  30 mg  08/02/22 18:00  08/05/22 18:27





  Mirtazapine 15 Mg Tab  PO   30 mg





  HS@1800 SRIDHAR   Administration


 


Naloxone HCl  0.2 mg  08/02/22 05:51 





  Naloxone 0.4 Mg/Ml 1 Ml Vial  IV  





  Q2M PRN  





  Opioid Reversal  


 


Ondansetron HCl  4 mg  08/02/22 05:51 





  Ondansetron 4 Mg/2 Ml Vial  IVP  





  Q8HR PRN  





  Nausea And Vomiting  


 


Pantoprazole Sodium  40 mg  08/04/22 07:30  08/06/22 08:32





  Pantoprazole 40 Mg Tablet  PO   40 mg





  AC-BRKFST SRIDHAR   Administration


 


Pravastatin Sodium  40 mg  08/02/22 18:00  08/05/22 18:26





  Pravastatin Sodium 40 Mg Tab  PO   40 mg





  HS@1800 SRIDHAR   Administration


 


Quetiapine Fumarate  50 mg  08/02/22 18:00  08/05/22 18:26





  Quetiapine 50 Mg Tab  PO   50 mg





  HS@1800 SRIDHAR   Administration


 


Tamsulosin HCl  0.4 mg  08/02/22 13:15  08/06/22 08:32





  Tamsulosin 0.4 Mg Cap.Er.24h  PO   0.4 mg





  PC-BRKFST SRIDHAR   Administration


 


Zinc Sulfate  220 mg  08/03/22 08:00  08/06/22 08:32





  Zinc Sulfate 220 Mg Cap  PO   220 mg





  DAILY@0800 SRIDHAR   Administration








                                Intake and Output











 08/06/22 08/06/22 08/06/22





 06:59 14:59 22:59


 


Intake Total  16.5 


 


Output Total 1200  


 


Balance -1200 16.5 


 


Intake:   


 


  Intake, IV Titration  16.5 





  Amount   


 


    Diltiazem 125 mg In  16.5 





    Sodium Chloride 0.9% 100   





    ml @ Per Protocol IV .Q0M   





    [facility] Rx#:[id number]   


 


Output:   


 


  Urine 1200  


 


Other:   


 


  Voiding Method  Indwelling Catheter 








                                        





                                 08/06/22 12:18 





                                 08/06/22 12:18 











Assessment and Plan


Assessment: 





Assessment


#1 urosepsis


#2 possible component of dehydration


#3 atrial fibrillation with rapid ventricular response


#4 known history of permanent atrial fibrillation


#5 preserved left ventricle systolic function





Plan


#1 IV hydration to be continued


#2 DC Cardizem IV and start the patient on amiodarone IV


#3 uptitrate the dose of beta blocker orally


#4 no need for further cardiac workup like echocardiogram


#5 follow-up with the patient
17-Feb-2021 17:55

## 2022-08-10 ENCOUNTER — APPOINTMENT (OUTPATIENT)
Dept: CT IMAGING | Facility: CLINIC | Age: 82
End: 2022-08-10

## 2022-08-10 PROCEDURE — 72192 CT PELVIS W/O DYE: CPT | Mod: MH

## 2022-09-07 ENCOUNTER — APPOINTMENT (OUTPATIENT)
Dept: PULMONOLOGY | Facility: CLINIC | Age: 82
End: 2022-09-07

## 2022-09-07 VITALS
RESPIRATION RATE: 16 BRPM | TEMPERATURE: 98.2 F | HEART RATE: 69 BPM | SYSTOLIC BLOOD PRESSURE: 148 MMHG | OXYGEN SATURATION: 97 % | DIASTOLIC BLOOD PRESSURE: 67 MMHG

## 2022-09-07 DIAGNOSIS — E87.1 HYPO-OSMOLALITY AND HYPONATREMIA: ICD-10-CM

## 2022-09-07 PROCEDURE — 90662 IIV NO PRSV INCREASED AG IM: CPT

## 2022-09-07 PROCEDURE — 94010 BREATHING CAPACITY TEST: CPT

## 2022-09-07 PROCEDURE — G0008: CPT

## 2022-09-07 PROCEDURE — 99214 OFFICE O/P EST MOD 30 MIN: CPT | Mod: 25

## 2022-09-07 PROCEDURE — 94729 DIFFUSING CAPACITY: CPT

## 2022-09-07 PROCEDURE — 95012 NITRIC OXIDE EXP GAS DETER: CPT

## 2022-09-07 PROCEDURE — 94727 GAS DIL/WSHOT DETER LNG VOL: CPT

## 2022-09-07 PROCEDURE — 36415 COLL VENOUS BLD VENIPUNCTURE: CPT

## 2022-09-08 ENCOUNTER — NON-APPOINTMENT (OUTPATIENT)
Age: 82
End: 2022-09-08

## 2022-09-08 LAB
ANION GAP SERPL CALC-SCNC: 13 MMOL/L
BUN SERPL-MCNC: 18 MG/DL
CALCIUM SERPL-MCNC: 9.8 MG/DL
CHLORIDE SERPL-SCNC: 94 MMOL/L
CO2 SERPL-SCNC: 24 MMOL/L
CREAT SERPL-MCNC: 0.58 MG/DL
EGFR: 90 ML/MIN/1.73M2
GLUCOSE SERPL-MCNC: 90 MG/DL
POTASSIUM SERPL-SCNC: 4 MMOL/L
SODIUM SERPL-SCNC: 131 MMOL/L

## 2022-09-08 NOTE — CONSULT LETTER
[Dear  ___] : Dear  [unfilled], [Courtesy Letter:] : I had the pleasure of seeing your patient, [unfilled], in my office today. [Please see my note below.] : Please see my note below. [Sincerely,] : Sincerely, [FreeTextEntry3] : Bharat Arguelles D.O., JYOTI\par  of Medicine\par [employer]\par

## 2022-09-08 NOTE — HISTORY OF PRESENT ILLNESS
[Never] : never [Stable] : are stable [None] : ~He/She~ has no significant interval events [Difficulty Breathing During Exertion] : denies dyspnea on exertion [Feelings Of Weakness On Exertion] : denies exercise intolerance [Cough] : denies coughing [Wheezing] : denies wheezing [Regional Soft Tissue Swelling Both Lower Extremities] : denies lower extremity edema [Chest Pain Or Discomfort] : denies chest pain [Fever] : denies fever [TextBox_4] : post hospitalization hyponatremia\par then had transfer to Rehab and was discharged home \par f/u was readmitted [facility] and told Neg COVID but had  a  virus feels still active \par [facility] added for PNDS nasal Rx\par no tx antibx\par pos SOB DODGE\par HTN  rx meds Hydralazine lisinopril Metoprolol\par \par March [facility] review of imaging studies\par March 16, 2022\par CT pelvis\par No fractures\par Lumbar disc disease\par Moderate to severe spinal stenosis\par CT cervical spine and head negative\par X-ray of hip March 14, 2022 no displaced fracture\par No chest x-ray reviewed available for review\par \par Discharge medication reviewed\par Aspirin 81 mg\par Density 200 mg\par 1 deficit\par Zofran\par Pantoprazole 40 mg\par Hydralazine 50 mg 3 times daily\par As needed Xanax\par Gabapentin 600 mg 3 times daily\par Lisinopril 20 mg twice daily\par Metoprolol 25 mg twice daily\par Was was also treated with nitrofurantoin\par \par \par completed PFIZER  COVID vaccine [Wt Gain ___ Lbs] : no recent weight gain [Wt Loss ___ Lbs] : no recent weight loss [Oxygen] : the patient uses no supplemental oxygen

## 2022-09-08 NOTE — DISCUSSION/SUMMARY
[FreeTextEntry1] : SOB DODGE\par post hyponatremia\par per cardiology on daily lasix 40 mg\par "Impaction distal bronchi\par Obstructive airway disease\par Mild decline pulmonary physiology- restart Trelegy Elipita- OFF\par Other history as noted above\par Vaccination and pneumococcal profile up to date\par Advised if needs to use should notify us for evaluation\par [facility] cancer screening\par F/U [facility] Colonoscopy up to  date\par Spinal disease  with possible epidural vs surgical procedure\par PFIZER  completed X 3\par Flu  vaccine up to date\par Strongly advised to speak with cardiology ASAP for management of blood pressure\par Based historically on the severe low sodium with syncope patient is hesitant to restart any diuretic even with the noted small left pleural effusion above\par Will renew Xanax\par Advised if still not feeling well report immediately to the emergency department at [place] or [place] for reevaluation\par Discussed with PMD\par Patient remains clinically improved with holding of diuretic\par

## 2022-09-08 NOTE — PROCEDURE
[FreeTextEntry1] : PFT 9/7/2022\par Moderate OAD\par Lung Volumes nl\par DLCO 66 % with mild loss fx  alveolar  capillary units\par  HGB 12.0\par NIOX  8  ppb WNL 9/7/2022\par stable pulmonary physiology\par \par PFT June 8, 2022\par Mild obstructive ventilatory impairment\par Significant 25% response to bronchodilator at the FEV1\par Lung volumes normal\par Total lung capacity 89% predicted\par Diffusion low normal range 70% predicted\par Hemoglobin 10.7\par Data comparison to April 22, 2022 demonstrates stable FVC with just greater than 200 cc interval improvement at the FEV1\par \par Chest x-ray PA lateral june 8 2022\par Borderline cardiomegaly\par Right lateral scoliosis\par Lung fields are clear\par No evidence of cephalization or congestion\par No interstitial changes\par Costophrenic angles are clear\par No recurrence of pleural effusion\par \par Pulmonary exercise study 6/8/22\par RA study\par  neg desaturation\par \par Chest x-ray PA lateral May 6, 2022\par Cardiomegaly\par Right lung is clear\par No evidence for a right pleural effusion\par Left costophrenic angle blunted with some component of atelectasis\par Comparison to chest x-ray of April 22, 2022 which demonstrated a very minimal scar atelectatic changes with possible left pleural effusion although differences in technique does suggest some level of improvement clinical correlate with exam with no crackles or auscultated at today's visit\par \par Chest x-ray PA lateral\par April 22, 2022\par Blunting left costophrenic angle\par Patient's x-ray was done with overlying draw\par Difficult to assess\par No clear parenchymal infiltrate or pleural effusion\par Rule out increase shortness of breath secondary to congestive heart failure\par The left pleural effusion is new\par Compared to a chest x-ray of January 13, 2021\par \par MOUSTAPHA 4/22/22\par Mod severe OAD\par \par PFT 10/20/21\par Flow rates nl\par  minimal OAD ratio 78\par Lung Volumes nl\par  DLCO  78 %\par HGB 11.5\par \par PFT 4/15/21\par Mild  borderline  moderate  OAD\par Lung volumes normal range\par DLCO  83 %\par HGGB 10.4\par NIOX  12  Nl range  4/15/21\par \par PFT 1/13/21\par mild  mod reduction flow rates\par Moderate OAD\par Normal lung volumes\par Normal DLCO 79 % pred noted mild decline pulmonary physiology\par \par X-ray PA lateral projection January 13, 2021\par Borderline cardiomegaly\par Right lateral scoliosis\par Lung fields otherwise clear without parenchymal infiltrate pleural effusion or dominant pulmonary nodules\par Soft tissue bony structures otherwise grossly unremarkable\par Impression clear lungs no gross evidence for rib fractures or infiltrates\par No interval change compared to chest x-ray July 15, 2020\par \par Chest x-ray PA lateral July 15, 2020\par Borderline cardiomegaly\par Hyperaeration\par Clear lungs\par No parenchymal infiltrate pleural effusions or dominant pulmonary nodules\par No interval change compared to chest x-ray of December 20, 2019\par \par PFT Body BOX July 10 2020\par mild OAD\par  No BD at FEV1\par Normal lung volumes\par  sp conductance and resistance normal\par Diffusion 80 % pred normal\par  HGB 13.0 \par \par Spirometry March 9, 2020\par Mild obstructive ventilatory impairment\par No bronchodilator response at FEV1\par Stable FEV1\par \par Chest x-ray PA lateral December 20, 2019\par Normal cardiac size\par Mild right lateral scoliosis\par Suggestion of some bronchiectatic changes at right lower lung zone\par This is unchanged compared to the recent chest x-ray of December 16, 2019\par No consolidation consistent with active pneumonia\par \par States Flu vaccination up-to-date\par History of pneumococcal vaccination and Prevnar administered\par \par PFT December 16, 2019\par Mild obstructive ventilatory impairment\par No response to bronchodilator at the FEV1\par Lung volumes are normal with total capacity 95% predicted.\par Diffusion relatively normal 78% of predicted.\par Hemoglobin 13.7\par \par Chest x-ray PA lateral December 16 2019\par Mild cardiomegaly\par Left lower lung zone minimal discoid linear atelectatic change\par No parenchymal infiltrates pleural effusions dominant pulmonary nodules\par Soft tissue bony structures grossly normal\par Impression no evidence of pneumonia\par \par Data review 12/21/2019\par Serum sodium 134 with serum chloride 93 normal renal function\par CBC White blood count 7.37 hemoglobin 14.3 hematocrit 43.4\par Procalcitonin negative\par RVP positive influenza A\par \par CT chest December 26, 2019\par Right mid lower lung zone tree-in-bud with centrilobular groundglass nodular opacities most consistent with impacted distal airways\par \par Blood draw\par \par HD Flu vaccine 9/7/2022

## 2022-09-19 ENCOUNTER — APPOINTMENT (OUTPATIENT)
Dept: SURGERY | Facility: CLINIC | Age: 82
End: 2022-09-19

## 2022-09-19 VITALS
HEIGHT: 64 IN | DIASTOLIC BLOOD PRESSURE: 75 MMHG | SYSTOLIC BLOOD PRESSURE: 147 MMHG | BODY MASS INDEX: 22.71 KG/M2 | HEART RATE: 66 BPM | TEMPERATURE: 98.2 F | OXYGEN SATURATION: 97 % | WEIGHT: 133 LBS

## 2022-09-19 DIAGNOSIS — R10.31 RIGHT LOWER QUADRANT PAIN: ICD-10-CM

## 2022-09-19 DIAGNOSIS — K40.90 UNILATERAL INGUINAL HERNIA, W/OUT OBSTRUCTION OR GANGRENE, NOT SPECIFIED AS RECURRENT: ICD-10-CM

## 2022-09-19 PROCEDURE — 99204 OFFICE O/P NEW MOD 45 MIN: CPT

## 2022-09-30 ENCOUNTER — NON-APPOINTMENT (OUTPATIENT)
Age: 82
End: 2022-09-30

## 2022-10-07 ENCOUNTER — APPOINTMENT (OUTPATIENT)
Dept: PULMONOLOGY | Facility: CLINIC | Age: 82
End: 2022-10-07

## 2022-10-07 VITALS — DIASTOLIC BLOOD PRESSURE: 46 MMHG | SYSTOLIC BLOOD PRESSURE: 180 MMHG | HEART RATE: 73 BPM | OXYGEN SATURATION: 100 %

## 2022-10-07 VITALS — SYSTOLIC BLOOD PRESSURE: 126 MMHG | DIASTOLIC BLOOD PRESSURE: 82 MMHG

## 2022-10-07 DIAGNOSIS — G45.9 TRANSIENT CEREBRAL ISCHEMIC ATTACK, UNSPECIFIED: ICD-10-CM

## 2022-10-07 PROCEDURE — 99215 OFFICE O/P EST HI 40 MIN: CPT | Mod: 25

## 2022-10-07 PROCEDURE — 88738 HGB QUANT TRANSCUTANEOUS: CPT

## 2022-10-07 PROCEDURE — 71046 X-RAY EXAM CHEST 2 VIEWS: CPT

## 2022-10-07 PROCEDURE — 36415 COLL VENOUS BLD VENIPUNCTURE: CPT

## 2022-10-07 PROCEDURE — 94010 BREATHING CAPACITY TEST: CPT

## 2022-10-07 NOTE — PROCEDURE
[FreeTextEntry1] : Jermyn No BD 10/7/22\par severe reduction flow rates\par  pos decline\par HGB 7.0\par \par X-ray PA lateral October 7, 2022 indication chest congestion\par Borderline cardiomegaly\par Mild calcification aortic knob\par No parenchymal infiltrates pleural effusions dominant pulmonary nodules\par Thinning refuel spine\par Mild increased retrosternal airspace on lateral view\par No gross interval change compared to chest x-ray of July 2022\par \par PFT 9/7/2022\par Moderate OAD\par Lung Volumes nl\par DLCO 66 % with mild loss fx  alveolar  capillary units\par  HGB 12.0\par NIOX  8  ppb WNL 9/7/2022\par stable pulmonary physiology\par \par PFT June 8, 2022\par Mild obstructive ventilatory impairment\par Significant 25% response to bronchodilator at the FEV1\par Lung volumes normal\par Total lung capacity 89% predicted\par Diffusion low normal range 70% predicted\par Hemoglobin 10.7\par Data comparison to April 22, 2022 demonstrates stable FVC with just greater than 200 cc interval improvement at the FEV1\par \par Chest x-ray PA lateral june 8 2022\par Borderline cardiomegaly\par Right lateral scoliosis\par Lung fields are clear\par No evidence of cephalization or congestion\par No interstitial changes\par Costophrenic angles are clear\par No recurrence of pleural effusion\par \par Pulmonary exercise study 6/8/22\par RA study\par  neg desaturation\par \par Chest x-ray PA lateral May 6, 2022\par Cardiomegaly\par Right lung is clear\par No evidence for a right pleural effusion\par Left costophrenic angle blunted with some component of atelectasis\par Comparison to chest x-ray of April 22, 2022 which demonstrated a very minimal scar atelectatic changes with possible left pleural effusion although differences in technique does suggest some level of improvement clinical correlate with exam with no crackles or auscultated at today's visit\par \par Chest x-ray PA lateral\par April 22, 2022\par Blunting left costophrenic angle\par Patient's x-ray was done with overlying draw\par Difficult to assess\par No clear parenchymal infiltrate or pleural effusion\par Rule out increase shortness of breath secondary to congestive heart failure\par The left pleural effusion is new\par Compared to a chest x-ray of January 13, 2021\par \par MOUSTAPHA 4/22/22\par Mod severe OAD\par \par PFT 10/20/21\par Flow rates nl\par  minimal OAD ratio 78\par Lung Volumes nl\par  DLCO  78 %\par HGB 11.5\par \par PFT 4/15/21\par Mild  borderline  moderate  OAD\par Lung volumes normal range\par DLCO  83 %\par HGGB 10.4\par NIOX  12  Nl range  4/15/21\par \par PFT 1/13/21\par mild  mod reduction flow rates\par Moderate OAD\par Normal lung volumes\par Normal DLCO 79 % pred noted mild decline pulmonary physiology\par \par X-ray PA lateral projection January 13, 2021\par Borderline cardiomegaly\par Right lateral scoliosis\par Lung fields otherwise clear without parenchymal infiltrate pleural effusion or dominant pulmonary nodules\par Soft tissue bony structures otherwise grossly unremarkable\par Impression clear lungs no gross evidence for rib fractures or infiltrates\par No interval change compared to chest x-ray July 15, 2020\par \par Chest x-ray PA lateral July 15, 2020\par Borderline cardiomegaly\par Hyperaeration\par Clear lungs\par No parenchymal infiltrate pleural effusions or dominant pulmonary nodules\par No interval change compared to chest x-ray of December 20, 2019\par \par PFT Body BOX July 10 2020\par mild OAD\par  No BD at FEV1\par Normal lung volumes\par  sp conductance and resistance normal\par Diffusion 80 % pred normal\par  HGB 13.0 \par \par Spirometry March 9, 2020\par Mild obstructive ventilatory impairment\par No bronchodilator response at FEV1\par Stable FEV1\par \par Chest x-ray PA lateral December 20, 2019\par Normal cardiac size\par Mild right lateral scoliosis\par Suggestion of some bronchiectatic changes at right lower lung zone\par This is unchanged compared to the recent chest x-ray of December 16, 2019\par No consolidation consistent with active pneumonia\par \par States Flu vaccination up-to-date\par History of pneumococcal vaccination and Prevnar administered\par \par PFT December 16, 2019\par Mild obstructive ventilatory impairment\par No response to bronchodilator at the FEV1\par Lung volumes are normal with total capacity 95% predicted.\par Diffusion relatively normal 78% of predicted.\par Hemoglobin 13.7\par \par Chest x-ray PA lateral December 16 2019\par Mild cardiomegaly\par Left lower lung zone minimal discoid linear atelectatic change\par No parenchymal infiltrates pleural effusions dominant pulmonary nodules\par Soft tissue bony structures grossly normal\par Impression no evidence of pneumonia\par \par Data review 12/21/2019\par Serum sodium 134 with serum chloride 93 normal renal function\par CBC White blood count 7.37 hemoglobin 14.3 hematocrit 43.4\par Procalcitonin negative\par RVP positive influenza A\par \par CT chest December 26, 2019\par Right mid lower lung zone tree-in-bud with centrilobular groundglass nodular opacities most consistent with impacted distal airways\par \par Blood draw\par \par HD Flu vaccine 9/7/2022

## 2022-10-07 NOTE — DISCUSSION/SUMMARY
[FreeTextEntry1] : SOB DODGE\par r/o sec  anemia on Plavix and ASP\par anemia w/u possible HEWME GI \par \par post hyponatremia\par per cardiology on daily lasix 40 mg\par "Impaction distal bronchi\par Obstructive airway disease\par Mild decline pulmonary physiology- restart Trelegy Elipita- OFF\par Post TIA on Plavix management neurology follow-up cardiology\par Other history as noted above\par Vaccination and pneumococcal profile up to date\par Advised if needs to use should notify us for evaluation\par [facility] cancer screening\par F/U Héctor Frost Colonoscopyendoscopy \par May need Heme consulty\par Spinal disease  with possible epidural vs surgical procedure\par PFIZER  completed X 3\par Flu  vaccine up to date\par Strongly advised to speak with cardiology ASAP for management of blood pressure\par Based historically on the severe low sodium with syncope patient is hesitant to restart any diuretic even with the noted small left pleural effusion above\par Will renew Xanax\par Advised if still not feeling well report immediately to the emergency department at [place] or [place] for reevaluation\par Discussed with PMD\par  with holding of diuretic pos  decline of pulmonary fx\par off resp controller therapy \par

## 2022-10-07 NOTE — PHYSICAL EXAM
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 No nerve compression. She should be seen by PT as we discussed previously. Let me know if she is agreeable, order can be placed.   [General Appearance - Well Developed] : well developed [Normal Appearance] : normal appearance [Well Groomed] : well groomed [General Appearance - Well Nourished] : well nourished [No Deformities] : no deformities [General Appearance - In No Acute Distress] : no acute distress [Normal Oropharynx] : normal oropharynx [I] : I [Neck Appearance] : the appearance of the neck was normal [Neck Cervical Mass (___cm)] : no neck mass was observed [Jugular Venous Distention Increased] : there was no jugular-venous distention [Thyroid Diffuse Enlargement] : the thyroid was not enlarged [Heart Rate And Rhythm] : heart rate and rhythm were normal [Heart Sounds] : normal S1 and S2 [Murmurs] : no murmurs present [Arterial Pulses Normal] : the arterial pulses were normal [Edema] : no peripheral edema present [Veins - Varicosity Changes] : no varicosital changes were noted in the lower extremities [Respiration, Rhythm And Depth] : normal respiratory rhythm and effort [Exaggerated Use Of Accessory Muscles For Inspiration] : no accessory muscle use [Chest Palpation] : palpation of the chest revealed no abnormalities [Lungs Percussion] : the lungs were normal to percussion [Abdomen Soft] : soft [Abdomen Tenderness] : non-tender [Abdomen Mass (___ Cm)] : no abdominal mass palpated [Abnormal Walk] : normal gait [Nail Clubbing] : no clubbing of the fingernails [Cyanosis, Localized] : no localized cyanosis [Petechial Hemorrhages (___cm)] : no petechial hemorrhages [Skin Color & Pigmentation] : normal skin color and pigmentation [] : no rash [No Venous Stasis] : no venous stasis [Skin Lesions] : no skin lesions [No Skin Ulcers] : no skin ulcer [No Xanthoma] : no  xanthoma was observed [Deep Tendon Reflexes (DTR)] : deep tendon reflexes were 2+ and symmetric [Sensation] : the sensory exam was normal to light touch and pinprick [No Focal Deficits] : no focal deficits [Oriented To Time, Place, And Person] : oriented to person, place, and time [Impaired Insight] : insight and judgment were intact [Affect] : the affect was normal [Mood] : the mood was normal [FreeTextEntry1] : varicose veins

## 2022-10-07 NOTE — CONSULT LETTER
[Dear  ___] : Dear  [unfilled], [Courtesy Letter:] : I had the pleasure of seeing your patient, [unfilled], in my office today. [Please see my note below.] : Please see my note below. [Sincerely,] : Sincerely, [FreeTextEntry3] : Bharat Arguelles D.O., JYOTI\par  of Medicine\par [employer]\par

## 2022-10-07 NOTE — HISTORY OF PRESENT ILLNESS
[Never] : never [Stable] : are stable [None] : ~He/She~ has no significant interval events [Difficulty Breathing During Exertion] : denies dyspnea on exertion [Feelings Of Weakness On Exertion] : denies exercise intolerance [Cough] : denies coughing [Wheezing] : denies wheezing [Regional Soft Tissue Swelling Both Lower Extremities] : denies lower extremity edema [Chest Pain Or Discomfort] : denies chest pain [Fever] : denies fever [TextBox_4] : Noted Renal Dr Rico to manage hyponatremia\par notes off diuretic some inc SOB\par pos croupy cough pos mucous green\par no fever chills \par noted per cardiac HGB 10.0\par seen GI Héctor Frost recommended endoscopy but at present at Plavix\par Hospital ECHO at [facility]  9/26/2\par EF > 75 %\par no reported valve disease or Pul HTN\par \par Post hospitalization [facility] 10/30/2022\par Get MRI told it was possible TIA a and was placed on Plavix\par Follow-up neurology cardiology renal noted that she was off her diuretic and the serum sodium was 135 in the hospital\par pos SOB DODGE\par HTN  rx meds Hydralazine lisinopril Metoprolol\par \par March [facility] review of imaging studies\par March 16, 2022\par CT pelvis\par No fractures\par Lumbar disc disease\par Moderate to severe spinal stenosis\par CT cervical spine and head negative\par X-ray of hip March 14, 2022 no displaced fracture\par No chest x-ray reviewed available for review\par \par Discharge medication reviewed\par Aspirin 81 mg\par Density 200 mg\par 1 deficit\par Zofran\par Pantoprazole 40 mg\par Hydralazine 50 mg 3 times daily\par As needed Xanax\par Gabapentin 600 mg 3 times daily\par Lisinopril 20 mg twice daily\par Metoprolol 25 mg twice daily\par Was was also treated with nitrofurantoin\par \par \par completed PFIZER  COVID vaccine [Wt Gain ___ Lbs] : no recent weight gain [Wt Loss ___ Lbs] : no recent weight loss [Oxygen] : the patient uses no supplemental oxygen

## 2022-10-08 ENCOUNTER — NON-APPOINTMENT (OUTPATIENT)
Age: 82
End: 2022-10-08

## 2022-10-08 ENCOUNTER — LABORATORY RESULT (OUTPATIENT)
Age: 82
End: 2022-10-08

## 2022-10-08 LAB
FERRITIN SERPL-MCNC: 7 NG/ML
FOLATE SERPL-MCNC: 7.9 NG/ML
IRON SATN MFR SERPL: 4 %
IRON SERPL-MCNC: 17 UG/DL
TIBC SERPL-MCNC: 482 UG/DL
UIBC SERPL-MCNC: 465 UG/DL
VIT B12 SERPL-MCNC: 511 PG/ML

## 2022-10-09 LAB
BASOPHILS # BLD AUTO: 0.02 K/UL
BASOPHILS NFR BLD AUTO: 0.4 %
EOSINOPHIL # BLD AUTO: 0.07 K/UL
EOSINOPHIL NFR BLD AUTO: 1.5 %
HCT VFR BLD CALC: 32 %
HGB BLD-MCNC: 9.6 G/DL
IMM GRANULOCYTES NFR BLD AUTO: 0.2 %
LYMPHOCYTES # BLD AUTO: 0.72 K/UL
LYMPHOCYTES NFR BLD AUTO: 15.5 %
MAN DIFF?: NORMAL
MCHC RBC-ENTMCNC: 25.1 PG
MCHC RBC-ENTMCNC: 30 GM/DL
MCV RBC AUTO: 83.8 FL
MONOCYTES # BLD AUTO: 1.06 K/UL
MONOCYTES NFR BLD AUTO: 22.8 %
NEUTROPHILS # BLD AUTO: 2.77 K/UL
NEUTROPHILS NFR BLD AUTO: 59.6 %
PLATELET # BLD AUTO: 214 K/UL
RBC # BLD: 3.82 M/UL
RBC # FLD: 14.3 %
WBC # FLD AUTO: 4.65 K/UL

## 2022-10-12 ENCOUNTER — APPOINTMENT (OUTPATIENT)
Dept: PULMONOLOGY | Facility: CLINIC | Age: 82
End: 2022-10-12

## 2022-10-12 VITALS — SYSTOLIC BLOOD PRESSURE: 148 MMHG | HEART RATE: 70 BPM | DIASTOLIC BLOOD PRESSURE: 65 MMHG | OXYGEN SATURATION: 96 %

## 2022-10-12 LAB
ALBUMIN MFR SERPL ELPH: 62.9 %
ALBUMIN SERPL-MCNC: 4.4 G/DL
ALBUMIN/GLOB SERPL: 1.7 RATIO
ALPHA1 GLOB MFR SERPL ELPH: 5.1 %
ALPHA1 GLOB SERPL ELPH-MCNC: 0.4 G/DL
ALPHA2 GLOB MFR SERPL ELPH: 11.3 %
ALPHA2 GLOB SERPL ELPH-MCNC: 0.8 G/DL
B-GLOBULIN MFR SERPL ELPH: 11.9 %
B-GLOBULIN SERPL ELPH-MCNC: 0.8 G/DL
GAMMA GLOB FLD ELPH-MCNC: 0.6 G/DL
GAMMA GLOB MFR SERPL ELPH: 8.8 %
INTERPRETATION SERPL IEP-IMP: NORMAL
PROT SERPL-MCNC: 7 G/DL
PROT SERPL-MCNC: 7 G/DL

## 2022-10-12 PROCEDURE — 99213 OFFICE O/P EST LOW 20 MIN: CPT

## 2022-10-12 RX ORDER — CIPROFLOXACIN HYDROCHLORIDE 250 MG/1
250 TABLET, FILM COATED ORAL
Qty: 6 | Refills: 0 | Status: DISCONTINUED | COMMUNITY
Start: 2022-05-06 | End: 2022-10-12

## 2022-10-12 RX ORDER — FLUTICASONE FUROATE, UMECLIDINIUM BROMIDE AND VILANTEROL TRIFENATATE 100; 62.5; 25 UG/1; UG/1; UG/1
100-62.5-25 POWDER RESPIRATORY (INHALATION)
Qty: 1 | Refills: 3 | Status: DISCONTINUED | COMMUNITY
Start: 2019-12-27 | End: 2022-10-12

## 2022-10-12 NOTE — DISCUSSION/SUMMARY
[FreeTextEntry1] : SOB DODGE\par pos chest congestion\par r/o sec  anemia on Plavix and ASP\par anemia w/u possible HEWME GI \par \par post hyponatremia\par per cardiology on daily lasix 40 mg\par "Impaction distal bronchi\par Obstructive airway disease\par Mild decline pulmonary physiology- restart Trelegy Elipita- sample dose 200 mcg dose\par Post TIA on Plavix management neurology follow-up cardiology\par Other history as noted above\par Vaccination and pneumococcal profile up to date\par Advised if needs to use should notify us for evaluation\par [facility] cancer screening\par F/U Héctor Frost Colonoscopyendoscopy \par May need Heme consulty\par Spinal disease  with possible epidural vs surgical procedure\par PFIZER  completed X 3\par Flu  vaccine up to date\par Strongly advised to speak with cardiology ASAP for management of blood pressure\par Based historically on the severe low sodium with syncope patient is hesitant to restart any diuretic even with the noted small left pleural effusion above\par Will renew Xanax\par Advised if still not feeling well report immediately to the emergency department at [place] or [place] for reevaluation\par Discussed with PMD\par  with holding of diuretic pos  decline of pulmonary fx\par off resp controller therapy  as noted above RS\par

## 2022-10-12 NOTE — HISTORY OF PRESENT ILLNESS
[Never] : never [Stable] : are stable [None] : ~He/She~ has no significant interval events [Difficulty Breathing During Exertion] : denies dyspnea on exertion [Feelings Of Weakness On Exertion] : denies exercise intolerance [Cough] : denies coughing [Wheezing] : denies wheezing [Regional Soft Tissue Swelling Both Lower Extremities] : denies lower extremity edema [Chest Pain Or Discomfort] : denies chest pain [Fever] : denies fever [TextBox_4] : GI virus management with Dr Frost\par \par Noted Renal Dr Rico to manage hyponatremia\par notes off diuretic some inc SOB\par pos croupy cough pos mucous green\par no fever chills \par noted per cardiac HGB 10.0\par seen GI Héctor Frost recommended endoscopy but at present at Plavix\par Hospital ECHO at [facility]  9/26/2\par EF > 75 %\par no reported valve disease or Pul HTN\par \par Post hospitalization [facility] 10/30/2022\par Get MRI told it was possible TIA a and was placed on Plavix\par Follow-up neurology cardiology renal noted that she was off her diuretic and the serum sodium was 135 in the hospital\par pos SOB DODGE\par HTN  rx meds Hydralazine lisinopril Metoprolol\par \par March [facility] review of imaging studies\par March 16, 2022\par CT pelvis\par No fractures\par Lumbar disc disease\par Moderate to severe spinal stenosis\par CT cervical spine and head negative\par X-ray of hip March 14, 2022 no displaced fracture\par No chest x-ray reviewed available for review\par \par Discharge medication reviewed\par Aspirin 81 mg\par Density 200 mg\par 1 deficit\par Zofran\par Pantoprazole 40 mg\par Hydralazine 50 mg 3 times daily\par As needed Xanax\par Gabapentin 600 mg 3 times daily\par Lisinopril 20 mg twice daily\par Metoprolol 25 mg twice daily\par Was was also treated with nitrofurantoin\par \par \par completed PFIZER  COVID vaccine [Wt Gain ___ Lbs] : no recent weight gain [Wt Loss ___ Lbs] : no recent weight loss [Oxygen] : the patient uses no supplemental oxygen

## 2022-10-12 NOTE — CONSULT LETTER
[Dear  ___] : Dear  [unfilled], [Courtesy Letter:] : I had the pleasure of seeing your patient, [unfilled], in my office today. [Please see my note below.] : Please see my note below. [Sincerely,] : Sincerely, [FreeTextEntry3] : Bharat Arguelles D.O., JYOTI\par  of Medicine\par [facility] [employer]\par

## 2022-10-12 NOTE — PROCEDURE
[FreeTextEntry1] : Vacherie No BD 10/7/22\par severe reduction flow rates\par  pos decline\par HGB 7.0\par \par X-ray PA lateral October 7, 2022 indication chest congestion\par Borderline cardiomegaly\par Mild calcification aortic knob\par No parenchymal infiltrates pleural effusions dominant pulmonary nodules\par Thinning refuel spine\par Mild increased retrosternal airspace on lateral view\par No gross interval change compared to chest x-ray of July 2022\par \par PFT 9/7/2022\par Moderate OAD\par Lung Volumes nl\par DLCO 66 % with mild loss fx  alveolar  capillary units\par  HGB 12.0\par NIOX  8  ppb WNL 9/7/2022\par stable pulmonary physiology\par \par PFT June 8, 2022\par Mild obstructive ventilatory impairment\par Significant 25% response to bronchodilator at the FEV1\par Lung volumes normal\par Total lung capacity 89% predicted\par Diffusion low normal range 70% predicted\par Hemoglobin 10.7\par Data comparison to April 22, 2022 demonstrates stable FVC with just greater than 200 cc interval improvement at the FEV1\par \par Chest x-ray PA lateral june 8 2022\par Borderline cardiomegaly\par Right lateral scoliosis\par Lung fields are clear\par No evidence of cephalization or congestion\par No interstitial changes\par Costophrenic angles are clear\par No recurrence of pleural effusion\par \par Pulmonary exercise study 6/8/22\par RA study\par  neg desaturation\par \par Chest x-ray PA lateral May 6, 2022\par Cardiomegaly\par Right lung is clear\par No evidence for a right pleural effusion\par Left costophrenic angle blunted with some component of atelectasis\par Comparison to chest x-ray of April 22, 2022 which demonstrated a very minimal scar atelectatic changes with possible left pleural effusion although differences in technique does suggest some level of improvement clinical correlate with exam with no crackles or auscultated at today's visit\par \par Chest x-ray PA lateral\par April 22, 2022\par Blunting left costophrenic angle\par Patient's x-ray was done with overlying draw\par Difficult to assess\par No clear parenchymal infiltrate or pleural effusion\par Rule out increase shortness of breath secondary to congestive heart failure\par The left pleural effusion is new\par Compared to a chest x-ray of January 13, 2021\par \par MOUSTAPHA 4/22/22\par Mod severe OAD\par \par PFT 10/20/21\par Flow rates nl\par  minimal OAD ratio 78\par Lung Volumes nl\par  DLCO  78 %\par HGB 11.5\par \par PFT 4/15/21\par Mild  borderline  moderate  OAD\par Lung volumes normal range\par DLCO  83 %\par HGGB 10.4\par NIOX  12  Nl range  4/15/21\par \par PFT 1/13/21\par mild  mod reduction flow rates\par Moderate OAD\par Normal lung volumes\par Normal DLCO 79 % pred noted mild decline pulmonary physiology\par \par X-ray PA lateral projection January 13, 2021\par Borderline cardiomegaly\par Right lateral scoliosis\par Lung fields otherwise clear without parenchymal infiltrate pleural effusion or dominant pulmonary nodules\par Soft tissue bony structures otherwise grossly unremarkable\par Impression clear lungs no gross evidence for rib fractures or infiltrates\par No interval change compared to chest x-ray July 15, 2020\par \par Chest x-ray PA lateral July 15, 2020\par Borderline cardiomegaly\par Hyperaeration\par Clear lungs\par No parenchymal infiltrate pleural effusions or dominant pulmonary nodules\par No interval change compared to chest x-ray of December 20, 2019\par \par PFT Body BOX July 10 2020\par mild OAD\par  No BD at FEV1\par Normal lung volumes\par  sp conductance and resistance normal\par Diffusion 80 % pred normal\par  HGB 13.0 \par \par Spirometry March 9, 2020\par Mild obstructive ventilatory impairment\par No bronchodilator response at FEV1\par Stable FEV1\par \par Chest x-ray PA lateral December 20, 2019\par Normal cardiac size\par Mild right lateral scoliosis\par Suggestion of some bronchiectatic changes at right lower lung zone\par This is unchanged compared to the recent chest x-ray of December 16, 2019\par No consolidation consistent with active pneumonia\par \par States Flu vaccination up-to-date\par History of pneumococcal vaccination and Prevnar administered\par \par PFT December 16, 2019\par Mild obstructive ventilatory impairment\par No response to bronchodilator at the FEV1\par Lung volumes are normal with total capacity 95% predicted.\par Diffusion relatively normal 78% of predicted.\par Hemoglobin 13.7\par \par Chest x-ray PA lateral December 16 2019\par Mild cardiomegaly\par Left lower lung zone minimal discoid linear atelectatic change\par No parenchymal infiltrates pleural effusions dominant pulmonary nodules\par Soft tissue bony structures grossly normal\par Impression no evidence of pneumonia\par \par Data review 12/21/2019\par Serum sodium 134 with serum chloride 93 normal renal function\par CBC White blood count 7.37 hemoglobin 14.3 hematocrit 43.4\par Procalcitonin negative\par RVP positive influenza A\par \par CT chest December 26, 2019\par Right mid lower lung zone tree-in-bud with centrilobular groundglass nodular opacities most consistent with impacted distal airways\par \par Blood draw\par \par HD Flu vaccine 9/7/2022

## 2022-10-12 NOTE — PHYSICAL EXAM
[General Appearance - Well Developed] : well developed [Normal Appearance] : normal appearance [Well Groomed] : well groomed [General Appearance - Well Nourished] : well nourished [No Deformities] : no deformities [General Appearance - In No Acute Distress] : no acute distress [Normal Oropharynx] : normal oropharynx [I] : I [Neck Appearance] : the appearance of the neck was normal [Neck Cervical Mass (___cm)] : no neck mass was observed [Jugular Venous Distention Increased] : there was no jugular-venous distention [Thyroid Diffuse Enlargement] : the thyroid was not enlarged [Heart Rate And Rhythm] : heart rate and rhythm were normal [Heart Sounds] : normal S1 and S2 [Murmurs] : no murmurs present [Arterial Pulses Normal] : the arterial pulses were normal [Edema] : no peripheral edema present [Veins - Varicosity Changes] : no varicosital changes were noted in the lower extremities [Respiration, Rhythm And Depth] : normal respiratory rhythm and effort [Exaggerated Use Of Accessory Muscles For Inspiration] : no accessory muscle use [Chest Palpation] : palpation of the chest revealed no abnormalities [Lungs Percussion] : the lungs were normal to percussion [Abdomen Soft] : soft [Abdomen Tenderness] : non-tender [Abdomen Mass (___ Cm)] : no abdominal mass palpated [Abnormal Walk] : normal gait [Nail Clubbing] : no clubbing of the fingernails [Cyanosis, Localized] : no localized cyanosis [Petechial Hemorrhages (___cm)] : no petechial hemorrhages [Skin Color & Pigmentation] : normal skin color and pigmentation [] : no rash [No Venous Stasis] : no venous stasis [Skin Lesions] : no skin lesions [No Skin Ulcers] : no skin ulcer [No Xanthoma] : no  xanthoma was observed [Deep Tendon Reflexes (DTR)] : deep tendon reflexes were 2+ and symmetric [Sensation] : the sensory exam was normal to light touch and pinprick [No Focal Deficits] : no focal deficits [Oriented To Time, Place, And Person] : oriented to person, place, and time [Impaired Insight] : insight and judgment were intact [Affect] : the affect was normal [Mood] : the mood was normal [Bowel Sounds] : normal bowel sounds [FreeTextEntry1] : varicose veins

## 2022-10-20 ENCOUNTER — OUTPATIENT (OUTPATIENT)
Dept: OUTPATIENT SERVICES | Facility: HOSPITAL | Age: 82
LOS: 1 days | Discharge: HOME | End: 2022-10-20

## 2022-10-20 DIAGNOSIS — D50.9 IRON DEFICIENCY ANEMIA, UNSPECIFIED: ICD-10-CM

## 2022-10-27 ENCOUNTER — APPOINTMENT (OUTPATIENT)
Dept: HEMATOLOGY ONCOLOGY | Facility: CLINIC | Age: 82
End: 2022-10-27

## 2022-10-27 ENCOUNTER — RESULT REVIEW (OUTPATIENT)
Age: 82
End: 2022-10-27

## 2022-10-27 VITALS
RESPIRATION RATE: 17 BRPM | OXYGEN SATURATION: 95 % | WEIGHT: 138.23 LBS | BODY MASS INDEX: 24.19 KG/M2 | HEIGHT: 63.39 IN | TEMPERATURE: 97.3 F | HEART RATE: 60 BPM | SYSTOLIC BLOOD PRESSURE: 199 MMHG | DIASTOLIC BLOOD PRESSURE: 79 MMHG

## 2022-10-27 VITALS — SYSTOLIC BLOOD PRESSURE: 205 MMHG | DIASTOLIC BLOOD PRESSURE: 77 MMHG

## 2022-10-27 LAB
BASOPHILS # BLD AUTO: 0.04 K/UL — SIGNIFICANT CHANGE UP (ref 0–0.2)
BASOPHILS NFR BLD AUTO: 0.8 % — SIGNIFICANT CHANGE UP (ref 0–2)
EOSINOPHIL # BLD AUTO: 0.32 K/UL — SIGNIFICANT CHANGE UP (ref 0–0.5)
EOSINOPHIL NFR BLD AUTO: 6.3 % — HIGH (ref 0–6)
HCT VFR BLD CALC: 31.5 % — LOW (ref 34.5–45)
HGB BLD-MCNC: 9.6 G/DL — LOW (ref 11.5–15.5)
IMM GRANULOCYTES NFR BLD AUTO: 0.4 % — SIGNIFICANT CHANGE UP (ref 0–0.9)
LYMPHOCYTES # BLD AUTO: 0.92 K/UL — LOW (ref 1–3.3)
LYMPHOCYTES # BLD AUTO: 18 % — SIGNIFICANT CHANGE UP (ref 13–44)
MCHC RBC-ENTMCNC: 24.1 PG — LOW (ref 27–34)
MCHC RBC-ENTMCNC: 30.5 G/DL — LOW (ref 32–36)
MCV RBC AUTO: 79.1 FL — LOW (ref 80–100)
MONOCYTES # BLD AUTO: 0.78 K/UL — SIGNIFICANT CHANGE UP (ref 0–0.9)
MONOCYTES NFR BLD AUTO: 15.3 % — HIGH (ref 2–14)
NEUTROPHILS # BLD AUTO: 3.03 K/UL — SIGNIFICANT CHANGE UP (ref 1.8–7.4)
NEUTROPHILS NFR BLD AUTO: 59.2 % — SIGNIFICANT CHANGE UP (ref 43–77)
NRBC # BLD: 0 /100 WBCS — SIGNIFICANT CHANGE UP (ref 0–0)
PLATELET # BLD AUTO: 189 K/UL — SIGNIFICANT CHANGE UP (ref 150–400)
RBC # BLD: 3.98 M/UL — SIGNIFICANT CHANGE UP (ref 3.8–5.2)
RBC # FLD: 15.1 % — HIGH (ref 10.3–14.5)
WBC # BLD: 5.11 K/UL — SIGNIFICANT CHANGE UP (ref 3.8–10.5)
WBC # FLD AUTO: 5.11 K/UL — SIGNIFICANT CHANGE UP (ref 3.8–10.5)

## 2022-10-27 PROCEDURE — 99204 OFFICE O/P NEW MOD 45 MIN: CPT

## 2022-11-03 NOTE — ASSESSMENT
[FreeTextEntry1] : 81yo F with HTN, HLD, hypothyroidism here for further management of iron deficiency anemia. \par She saw Dr. Santosh LÓPEZ who is planning to do an endoscopy. He prescribed pt iron supplements but she hasn't started them yet. \par We reviewed her labwork results\par Advised her to start iron supplementation. If she develops intolerable side effects, we discussed doing iron intravenously. Pt agreeable to start \par Will have her come back in 3 mo to repeat labs\par All questions answered\par RTC 3 mo

## 2022-11-03 NOTE — HISTORY OF PRESENT ILLNESS
[de-identified] : 83yo F with HTN, HLD, hypothyroidism here for further management of iron deficiency anemia. \par \par Pt reports that in March, she was hospitalized at [facility] after a syncopal episode, found to have Na 120. She was taken off diuretics. She follows closely with cardiologist at [place] -notes that he called her in Sept b/c Hgb dropped. She was hospitalized again, this time at [place], in Sept for a TIA, started on Plavix in addition to ASA. \par 10/7/22 showed iron 17, TIBC 482, TSAT 4%, ferritin 7\par She saw Dr. Santosh LÓPEZ who is planning to do an endoscopy. He prescribed pt iron supplements but she hasn't started them yet. \par \par She gets cold easily but otherwise feels well.

## 2022-11-05 NOTE — HISTORY OF PRESENT ILLNESS
[FreeTextEntry1] : Pt presents to the office with c/o urinary frequency for the past few days.  She took only 1 dose of Uristat.  Denies any fever, chills, back pain, or blood in urine.  She is doing fine with her vaginal atrophy using Estrace cream.\par She was on abx Nitrofurantoin prophylaxis but haven't been on it for a long while.   Yes

## 2022-11-07 ENCOUNTER — APPOINTMENT (OUTPATIENT)
Dept: UROGYNECOLOGY | Facility: CLINIC | Age: 82
End: 2022-11-07

## 2022-11-12 NOTE — ED ADULT NURSE NOTE - NURSING MUSC ROM
pt A&Ox4, states that she had a seizure while sitting in a chair denies falling or head trauma pt has hx of seizure and just had her seizure meds increased and only had 1 dose so far. resp even and unlabored no distress noted,
full range of motion in all extremities

## 2022-11-18 ENCOUNTER — APPOINTMENT (OUTPATIENT)
Dept: HEMATOLOGY ONCOLOGY | Facility: CLINIC | Age: 82
End: 2022-11-18

## 2022-11-18 ENCOUNTER — RESULT REVIEW (OUTPATIENT)
Age: 82
End: 2022-11-18

## 2022-11-18 ENCOUNTER — OUTPATIENT (OUTPATIENT)
Dept: OUTPATIENT SERVICES | Facility: HOSPITAL | Age: 82
LOS: 1 days | End: 2022-11-18
Payer: MEDICARE

## 2022-11-18 VITALS
OXYGEN SATURATION: 95 % | TEMPERATURE: 97.2 F | BODY MASS INDEX: 23.84 KG/M2 | DIASTOLIC BLOOD PRESSURE: 85 MMHG | HEART RATE: 64 BPM | SYSTOLIC BLOOD PRESSURE: 212 MMHG | RESPIRATION RATE: 16 BRPM | WEIGHT: 136.25 LBS

## 2022-11-18 DIAGNOSIS — D64.9 ANEMIA, UNSPECIFIED: ICD-10-CM

## 2022-11-18 LAB
BASOPHILS # BLD AUTO: 0 K/UL — SIGNIFICANT CHANGE UP (ref 0–0.2)
BASOPHILS NFR BLD AUTO: 0 % — SIGNIFICANT CHANGE UP (ref 0–2)
ELLIPTOCYTES BLD QL SMEAR: SLIGHT — SIGNIFICANT CHANGE UP
EOSINOPHIL # BLD AUTO: 0.52 K/UL — HIGH (ref 0–0.5)
EOSINOPHIL NFR BLD AUTO: 10 % — HIGH (ref 0–6)
HCT VFR BLD CALC: 31.1 % — LOW (ref 34.5–45)
HGB BLD-MCNC: 9.5 G/DL — LOW (ref 11.5–15.5)
LYMPHOCYTES # BLD AUTO: 1.2 K/UL — SIGNIFICANT CHANGE UP (ref 1–3.3)
LYMPHOCYTES # BLD AUTO: 23 % — SIGNIFICANT CHANGE UP (ref 13–44)
MCHC RBC-ENTMCNC: 23.6 PG — LOW (ref 27–34)
MCHC RBC-ENTMCNC: 30.5 G/DL — LOW (ref 32–36)
MCV RBC AUTO: 77.4 FL — LOW (ref 80–100)
MONOCYTES # BLD AUTO: 0.62 K/UL — SIGNIFICANT CHANGE UP (ref 0–0.9)
MONOCYTES NFR BLD AUTO: 12 % — SIGNIFICANT CHANGE UP (ref 2–14)
NEUTROPHILS # BLD AUTO: 2.86 K/UL — SIGNIFICANT CHANGE UP (ref 1.8–7.4)
NEUTROPHILS NFR BLD AUTO: 55 % — SIGNIFICANT CHANGE UP (ref 43–77)
NRBC # BLD: 0 /100 — SIGNIFICANT CHANGE UP (ref 0–0)
NRBC # BLD: SIGNIFICANT CHANGE UP /100 WBCS (ref 0–0)
PLAT MORPH BLD: NORMAL — SIGNIFICANT CHANGE UP
PLATELET # BLD AUTO: 216 K/UL — SIGNIFICANT CHANGE UP (ref 150–400)
POIKILOCYTOSIS BLD QL AUTO: SLIGHT — SIGNIFICANT CHANGE UP
RBC # BLD: 4.02 M/UL — SIGNIFICANT CHANGE UP (ref 3.8–5.2)
RBC # FLD: 16.3 % — HIGH (ref 10.3–14.5)
RBC BLD AUTO: ABNORMAL
WBC # BLD: 5.2 K/UL — SIGNIFICANT CHANGE UP (ref 3.8–10.5)
WBC # FLD AUTO: 5.2 K/UL — SIGNIFICANT CHANGE UP (ref 3.8–10.5)

## 2022-11-18 PROCEDURE — 99213 OFFICE O/P EST LOW 20 MIN: CPT

## 2022-11-18 PROCEDURE — 86900 BLOOD TYPING SEROLOGIC ABO: CPT

## 2022-11-18 PROCEDURE — 86850 RBC ANTIBODY SCREEN: CPT

## 2022-11-18 PROCEDURE — 86901 BLOOD TYPING SEROLOGIC RH(D): CPT

## 2022-11-25 LAB
FERRITIN SERPL-MCNC: 10 NG/ML
IRON SATN MFR SERPL: 3 %
IRON SERPL-MCNC: 16 UG/DL
TIBC SERPL-MCNC: 510 UG/DL
UIBC SERPL-MCNC: 495 UG/DL

## 2022-12-13 NOTE — HISTORY OF PRESENT ILLNESS
[de-identified] : 83yo F with HTN, HLD, hypothyroidism here for further management of iron deficiency anemia. \par \par Pt reports that in March, she was hospitalized at [facility] after a syncopal episode, found to have Na 120. She was taken off diuretics. She follows closely with cardiologist at [place] -notes that he called her in Sept b/c Hgb dropped. She was hospitalized again, this time at [facility], in Sept for a TIA, started on Plavix in addition to ASA. \par 10/7/22 showed iron 17, TIBC 482, TSAT 4%, ferritin 7\par She saw Dr. Santosh LÓPZE who is planning to do an endoscopy. He prescribed pt iron supplements but she hasn't started them yet. \par \par She gets cold easily but otherwise feels well.  [de-identified] : After last visit on 10/27/22, pt started on iron supplement, tolerated it well. She had EDG done 1 wk ago no obvious bleeding per pt; she had a capsule study scheduled on 11/21 next week, she has been hold iron pill since last Monday. She felt cold dizzy as usual. She worried that holding iron pill may make her hgb drop, she called our office to check her CBC today. \par Her BP is high today, she is on BP medication nacinopril 20mg metroprolol hydralzine 50mg BID, she is very anxious about Hgb dropping. \par

## 2022-12-13 NOTE — ASSESSMENT
[FreeTextEntry1] : 83yo F with HTN, HLD, hypothyroidism here for further management of iron deficiency anemia. \par She saw Dr. Santosh LÓPEZ who is planning to do an endoscopy. He prescribed pt iron supplements, patient started after last visit with us, tolerated iron pill well. She had EDG done last week, iron pill was hold since last week, will have capsule study next week. She worried that holding iron pill will drop her hgb hence come today for count check. \par We reviewed CBC today, hgb 9.5 stable. \par Advised her to cont iron supplementation after capsule study. If she develops intolerable side effects such as constipation or upsetting stomach, we discussed doing iron intravenously. Pt agreeable\par Will have her come back in 3 mo to repeat labs\par All questions answered\par RTC 3 mo.\par \par \par Case and management discussed with Dr. Ritter\par

## 2023-01-09 ENCOUNTER — APPOINTMENT (OUTPATIENT)
Dept: PULMONOLOGY | Facility: CLINIC | Age: 83
End: 2023-01-09
Payer: MEDICARE

## 2023-01-09 VITALS — SYSTOLIC BLOOD PRESSURE: 196 MMHG | HEART RATE: 54 BPM | OXYGEN SATURATION: 96 % | DIASTOLIC BLOOD PRESSURE: 73 MMHG

## 2023-01-09 LAB — POCT - HEMOGLOBIN (HGB), QUANTITATIVE, TRANSCUTANEOUS: 11.4

## 2023-01-09 PROCEDURE — 36415 COLL VENOUS BLD VENIPUNCTURE: CPT

## 2023-01-09 PROCEDURE — 88738 HGB QUANT TRANSCUTANEOUS: CPT

## 2023-01-09 PROCEDURE — 99214 OFFICE O/P EST MOD 30 MIN: CPT | Mod: 25

## 2023-01-09 PROCEDURE — 94010 BREATHING CAPACITY TEST: CPT

## 2023-01-09 RX ORDER — AZITHROMYCIN 250 MG/1
250 TABLET, FILM COATED ORAL
Qty: 1 | Refills: 0 | Status: DISCONTINUED | COMMUNITY
Start: 2022-10-08 | End: 2023-01-09

## 2023-01-09 RX ORDER — SULFAMETHOXAZOLE AND TRIMETHOPRIM 800; 160 MG/1; MG/1
800-160 TABLET ORAL
Qty: 6 | Refills: 0 | Status: DISCONTINUED | COMMUNITY
Start: 2021-05-20 | End: 2023-01-09

## 2023-01-09 RX ORDER — PHENAZOPYRIDINE 200 MG/1
200 TABLET, FILM COATED ORAL
Qty: 20 | Refills: 1 | Status: DISCONTINUED | COMMUNITY
Start: 2021-07-09 | End: 2023-01-09

## 2023-01-09 RX ORDER — METHYLPREDNISOLONE 4 MG/1
4 TABLET ORAL
Qty: 1 | Refills: 0 | Status: DISCONTINUED | COMMUNITY
Start: 2022-10-08 | End: 2023-01-09

## 2023-01-09 RX ORDER — CEPHALEXIN 500 MG/1
500 CAPSULE ORAL TWICE DAILY
Qty: 14 | Refills: 0 | Status: DISCONTINUED | COMMUNITY
Start: 2021-08-27 | End: 2023-01-09

## 2023-01-09 RX ORDER — NITROFURANTOIN (MONOHYDRATE/MACROCRYSTALS) 25; 75 MG/1; MG/1
100 CAPSULE ORAL
Qty: 10 | Refills: 0 | Status: DISCONTINUED | COMMUNITY
Start: 2021-05-25 | End: 2023-01-09

## 2023-01-09 NOTE — PROCEDURE
[FreeTextEntry1] : Howe No BD 1/9/23\par mild reduction flow rates\par  ratio 66 Mild OAD\par \par Nahum No BD 10/7/22\par severe reduction flow rates\par  pos decline\par HGB 7.0\par \par X-ray PA lateral October 7, 2022 indication chest congestion\par Borderline cardiomegaly\par Mild calcification aortic knob\par No parenchymal infiltrates pleural effusions dominant pulmonary nodules\par Thinning refuel spine\par Mild increased retrosternal airspace on lateral view\par No gross interval change compared to chest x-ray of July 2022\par \par PFT 9/7/2022\par Moderate OAD\par Lung Volumes nl\par DLCO 66 % with mild loss fx  alveolar  capillary units\par  HGB 12.0\par NIOX  8  ppb WNL 9/7/2022\par stable pulmonary physiology\par \par PFT June 8, 2022\par Mild obstructive ventilatory impairment\par Significant 25% response to bronchodilator at the FEV1\par Lung volumes normal\par Total lung capacity 89% predicted\par Diffusion low normal range 70% predicted\par Hemoglobin 10.7\par Data comparison to April 22, 2022 demonstrates stable FVC with just greater than 200 cc interval improvement at the FEV1\par \par Chest x-ray PA lateral june 8 2022\par Borderline cardiomegaly\par Right lateral scoliosis\par Lung fields are clear\par No evidence of cephalization or congestion\par No interstitial changes\par Costophrenic angles are clear\par No recurrence of pleural effusion\par \par Pulmonary exercise study 6/8/22\par RA study\par  neg desaturation\par \par Chest x-ray PA lateral May 6, 2022\par Cardiomegaly\par Right lung is clear\par No evidence for a right pleural effusion\par Left costophrenic angle blunted with some component of atelectasis\par Comparison to chest x-ray of April 22, 2022 which demonstrated a very minimal scar atelectatic changes with possible left pleural effusion although differences in technique does suggest some level of improvement clinical correlate with exam with no crackles or auscultated at today's visit\par \par Chest x-ray PA lateral\par April 22, 2022\par Blunting left costophrenic angle\par Patient's x-ray was done with overlying draw\par Difficult to assess\par No clear parenchymal infiltrate or pleural effusion\par Rule out increase shortness of breath secondary to congestive heart failure\par The left pleural effusion is new\par Compared to a chest x-ray of January 13, 2021\par \par NAHUM 4/22/22\par Mod severe OAD\par \par PFT 10/20/21\par Flow rates nl\par  minimal OAD ratio 78\par Lung Volumes nl\par  DLCO  78 %\par HGB 11.5\par \par PFT 4/15/21\par Mild  borderline  moderate  OAD\par Lung volumes normal range\par DLCO  83 %\par HGGB 10.4\par NIOX  12  Nl range  4/15/21\par \par PFT 1/13/21\par mild  mod reduction flow rates\par Moderate OAD\par Normal lung volumes\par Normal DLCO 79 % pred noted mild decline pulmonary physiology\par \par X-ray PA lateral projection January 13, 2021\par Borderline cardiomegaly\par Right lateral scoliosis\par Lung fields otherwise clear without parenchymal infiltrate pleural effusion or dominant pulmonary nodules\par Soft tissue bony structures otherwise grossly unremarkable\par Impression clear lungs no gross evidence for rib fractures or infiltrates\par No interval change compared to chest x-ray July 15, 2020\par \par Chest x-ray PA lateral July 15, 2020\par Borderline cardiomegaly\par Hyperaeration\par Clear lungs\par No parenchymal infiltrate pleural effusions or dominant pulmonary nodules\par No interval change compared to chest x-ray of December 20, 2019\par \par PFT Body BOX July 10 2020\par mild OAD\par  No BD at FEV1\par Normal lung volumes\par  sp conductance and resistance normal\par Diffusion 80 % pred normal\par  HGB 13.0 \par \par Spirometry March 9, 2020\par Mild obstructive ventilatory impairment\par No bronchodilator response at FEV1\par Stable FEV1\par \par Chest x-ray PA lateral December 20, 2019\par Normal cardiac size\par Mild right lateral scoliosis\par Suggestion of some bronchiectatic changes at right lower lung zone\par This is unchanged compared to the recent chest x-ray of December 16, 2019\par No consolidation consistent with active pneumonia\par \par States Flu vaccination up-to-date\par History of pneumococcal vaccination and Prevnar administered\par \par PFT December 16, 2019\par Mild obstructive ventilatory impairment\par No response to bronchodilator at the FEV1\par Lung volumes are normal with total capacity 95% predicted.\par Diffusion relatively normal 78% of predicted.\par Hemoglobin 13.7\par \par Chest x-ray PA lateral December 16 2019\par Mild cardiomegaly\par Left lower lung zone minimal discoid linear atelectatic change\par No parenchymal infiltrates pleural effusions dominant pulmonary nodules\par Soft tissue bony structures grossly normal\par Impression no evidence of pneumonia\par \par Data review 12/21/2019\par Serum sodium 134 with serum chloride 93 normal renal function\par CBC White blood count 7.37 hemoglobin 14.3 hematocrit 43.4\par Procalcitonin negative\par RVP positive influenza A\par \par CT chest December 26, 2019\par Right mid lower lung zone tree-in-bud with centrilobular groundglass nodular opacities most consistent with impacted distal airways\par \par Blood draw\par \par HD Flu vaccine 9/7/2022

## 2023-01-09 NOTE — DISCUSSION/SUMMARY
[FreeTextEntry1] : SOB DODEG\par pos chest congestion\par r/o sec  anemia on Plavix and ASP\par anemia w/u possible HEWME GI \par \par post hyponatremia\par per cardiology on daily lasix 40 mg\par "Impaction distal bronchi\par Obstructive airway disease\par Mild decline pulmonary physiology- restart Trelegy Elipita- sample dose 200 mcg dose\par Post TIA on Plavix management neurology follow-up cardiology\par Other history as noted above\par Vaccination and pneumococcal profile up to date\par Advised if needs to use should notify us for evaluation\par [facility] cancer screening\par F/U [facility] Colonoscopyendoscopy \par May need Heme consulty\par Spinal disease  with possible epidural vs surgical procedure\par PFIZER  completed X 3\par Flu  vaccine up to date\par Strongly advised to speak with cardiology ASAP for management of blood pressure\par Based historically on the severe low sodium with syncope patient is hesitant to restart any diuretic even with the noted small left pleural effusion above\par Will renew Xanax\par Advised if still not feeling well report immediately to the emergency department at [place] or [place] for reevaluation\par Discussed with PMD\par  with holding of diuretic pos  decline of pulmonary fx\par off resp controller therapy  as noted above RS\par

## 2023-01-09 NOTE — HISTORY OF PRESENT ILLNESS
[Stable] : are stable [None] : ~He/She~ has no significant interval events [Difficulty Breathing During Exertion] : denies dyspnea on exertion [Feelings Of Weakness On Exertion] : denies exercise intolerance [Cough] : denies coughing [Wheezing] : denies wheezing [Regional Soft Tissue Swelling Both Lower Extremities] : denies lower extremity edema [Chest Pain Or Discomfort] : denies chest pain [Fever] : denies fever [TextBox_4] : states card\par off lasix sec low Na but at present on HCTZ 50 mg\par \par GI virus management with Dr Frost\par HEME noted completed EGD and capsule study \par Anemia (285.9) (D64.9)\par 81yo F with HTN, HLD, hypothyroidism here for further management of iron deficiency anemia. \par She saw Dr. Santosh LÓPEZ who is planning to do an endoscopy. He prescribed pt iron supplements, patient started after last visit with us, tolerated iron pill well. She had EDG done last week, iron pill was hold since last week, will have capsule study next week. She worried that holding iron pill will drop her hgb hence come today for count check. \par We reviewed CBC today, hgb 9.5 stable. \par Advised her to cont iron supplementation after capsule study. If she develops intolerable side effects such as constipation or upsetting stomach, we discussed doing iron intravenously. Pt agreeable\par Will have her come back in 3 mo to repeat labs\par \par Noted Renal Dr Rico to manage hyponatremia\par notes off diuretic some inc SOB\par pos croupy cough pos mucous green\par no fever chills \par noted per cardiac HGB 10.0\par seen GI Héctor Frost recommended endoscopy but at present at [facility]\par Hospital ECHO at [facility]  9/26/2\par EF > 75 %\par no reported valve disease or Pul HTN\par \par Post hospitalization [facility] 10/30/2022\par Get MRI told it was possible TIA a and was placed on Plavix\par Follow-up neurology cardiology renal noted that she was off her diuretic and the serum sodium was 135 in the hospital\par pos SOB DODGE\par HTN  rx meds Hydralazine lisinopril Metoprolol\par \par March [facility] review of imaging studies\par March 16, 2022\par CT pelvis\par No fractures\par Lumbar disc disease\par Moderate to severe spinal stenosis\par CT cervical spine and head negative\par X-ray of hip March 14, 2022 no displaced fracture\par No chest x-ray reviewed available for review\par \par Discharge medication reviewed\par Aspirin 81 mg\par Density 200 mg\par 1 deficit\par Zofran\par Pantoprazole 40 mg\par Hydralazine 50 mg 3 times daily\par As needed Xanax\par Gabapentin 600 mg 3 times daily\par Lisinopril 20 mg twice daily\par Metoprolol 25 mg twice daily\par Was was also treated with nitrofurantoin\par \par \par completed PFIZER  COVID vaccine [Wt Gain ___ Lbs] : no recent weight gain [Wt Loss ___ Lbs] : no recent weight loss [Oxygen] : the patient uses no supplemental oxygen

## 2023-01-09 NOTE — PHYSICAL EXAM
[General Appearance - Well Developed] : well developed [Normal Appearance] : normal appearance [Well Groomed] : well groomed [General Appearance - Well Nourished] : well nourished [No Deformities] : no deformities [General Appearance - In No Acute Distress] : no acute distress [Normal Oropharynx] : normal oropharynx [I] : I [Neck Appearance] : the appearance of the neck was normal [Neck Cervical Mass (___cm)] : no neck mass was observed [Jugular Venous Distention Increased] : there was no jugular-venous distention [Thyroid Diffuse Enlargement] : the thyroid was not enlarged [Heart Rate And Rhythm] : heart rate and rhythm were normal [Heart Sounds] : normal S1 and S2 [Murmurs] : no murmurs present [Arterial Pulses Normal] : the arterial pulses were normal [Edema] : no peripheral edema present [Veins - Varicosity Changes] : no varicosital changes were noted in the lower extremities [Respiration, Rhythm And Depth] : normal respiratory rhythm and effort [Exaggerated Use Of Accessory Muscles For Inspiration] : no accessory muscle use [Chest Palpation] : palpation of the chest revealed no abnormalities [Lungs Percussion] : the lungs were normal to percussion [Bowel Sounds] : normal bowel sounds [Abdomen Soft] : soft [Abdomen Tenderness] : non-tender [Abdomen Mass (___ Cm)] : no abdominal mass palpated [Abnormal Walk] : normal gait [Nail Clubbing] : no clubbing of the fingernails [Cyanosis, Localized] : no localized cyanosis [Petechial Hemorrhages (___cm)] : no petechial hemorrhages [Skin Color & Pigmentation] : normal skin color and pigmentation [] : no rash [No Venous Stasis] : no venous stasis [Skin Lesions] : no skin lesions [No Skin Ulcers] : no skin ulcer [No Xanthoma] : no  xanthoma was observed [Deep Tendon Reflexes (DTR)] : deep tendon reflexes were 2+ and symmetric [Sensation] : the sensory exam was normal to light touch and pinprick [No Focal Deficits] : no focal deficits [Oriented To Time, Place, And Person] : oriented to person, place, and time [Impaired Insight] : insight and judgment were intact [Affect] : the affect was normal [Mood] : the mood was normal [FreeTextEntry1] : varicose veins

## 2023-01-09 NOTE — CONSULT LETTER
[Dear  ___] : Dear  [unfilled], [Courtesy Letter:] : I had the pleasure of seeing your patient, [unfilled], in my office today. [Please see my note below.] : Please see my note below. [Sincerely,] : Sincerely, [FreeTextEntry3] : Bharat Arguelles D.O., JYOTI\par  of Medicine\par [facility] [employer]\par

## 2023-01-10 ENCOUNTER — NON-APPOINTMENT (OUTPATIENT)
Age: 83
End: 2023-01-10

## 2023-01-10 LAB
ANION GAP SERPL CALC-SCNC: 10 MMOL/L
BUN SERPL-MCNC: 14 MG/DL
CALCIUM SERPL-MCNC: 9.6 MG/DL
CHLORIDE SERPL-SCNC: 99 MMOL/L
CO2 SERPL-SCNC: 24 MMOL/L
CREAT SERPL-MCNC: 0.58 MG/DL
EGFR: 90 ML/MIN/1.73M2
GLUCOSE SERPL-MCNC: 85 MG/DL
POTASSIUM SERPL-SCNC: 4.4 MMOL/L
SODIUM SERPL-SCNC: 133 MMOL/L

## 2023-01-26 ENCOUNTER — OUTPATIENT (OUTPATIENT)
Dept: OUTPATIENT SERVICES | Facility: HOSPITAL | Age: 83
LOS: 1 days | Discharge: HOME | End: 2023-01-26

## 2023-01-26 DIAGNOSIS — D50.9 IRON DEFICIENCY ANEMIA, UNSPECIFIED: ICD-10-CM

## 2023-03-06 ENCOUNTER — APPOINTMENT (OUTPATIENT)
Dept: HEMATOLOGY ONCOLOGY | Facility: CLINIC | Age: 83
End: 2023-03-06

## 2023-03-20 ENCOUNTER — APPOINTMENT (OUTPATIENT)
Dept: HEMATOLOGY ONCOLOGY | Facility: CLINIC | Age: 83
End: 2023-03-20
Payer: MEDICARE

## 2023-03-20 ENCOUNTER — RESULT REVIEW (OUTPATIENT)
Age: 83
End: 2023-03-20

## 2023-03-20 VITALS
HEART RATE: 64 BPM | TEMPERATURE: 98 F | WEIGHT: 135.56 LBS | SYSTOLIC BLOOD PRESSURE: 186 MMHG | RESPIRATION RATE: 16 BRPM | BODY MASS INDEX: 23.73 KG/M2 | DIASTOLIC BLOOD PRESSURE: 82 MMHG | OXYGEN SATURATION: 96 %

## 2023-03-20 LAB
BASOPHILS # BLD AUTO: 0.04 K/UL — SIGNIFICANT CHANGE UP (ref 0–0.2)
BASOPHILS NFR BLD AUTO: 0.8 % — SIGNIFICANT CHANGE UP (ref 0–2)
EOSINOPHIL # BLD AUTO: 0.15 K/UL — SIGNIFICANT CHANGE UP (ref 0–0.5)
EOSINOPHIL NFR BLD AUTO: 3 % — SIGNIFICANT CHANGE UP (ref 0–6)
HCT VFR BLD CALC: 39.2 % — SIGNIFICANT CHANGE UP (ref 34.5–45)
HGB BLD-MCNC: 12.4 G/DL — SIGNIFICANT CHANGE UP (ref 11.5–15.5)
IMM GRANULOCYTES NFR BLD AUTO: 0.4 % — SIGNIFICANT CHANGE UP (ref 0–0.9)
LYMPHOCYTES # BLD AUTO: 1 K/UL — SIGNIFICANT CHANGE UP (ref 1–3.3)
LYMPHOCYTES # BLD AUTO: 20 % — SIGNIFICANT CHANGE UP (ref 13–44)
MCHC RBC-ENTMCNC: 28.1 PG — SIGNIFICANT CHANGE UP (ref 27–34)
MCHC RBC-ENTMCNC: 31.6 G/DL — LOW (ref 32–36)
MCV RBC AUTO: 88.7 FL — SIGNIFICANT CHANGE UP (ref 80–100)
MONOCYTES # BLD AUTO: 0.72 K/UL — SIGNIFICANT CHANGE UP (ref 0–0.9)
MONOCYTES NFR BLD AUTO: 14.4 % — HIGH (ref 2–14)
NEUTROPHILS # BLD AUTO: 3.07 K/UL — SIGNIFICANT CHANGE UP (ref 1.8–7.4)
NEUTROPHILS NFR BLD AUTO: 61.4 % — SIGNIFICANT CHANGE UP (ref 43–77)
NRBC # BLD: 0 /100 WBCS — SIGNIFICANT CHANGE UP (ref 0–0)
PLATELET # BLD AUTO: 203 K/UL — SIGNIFICANT CHANGE UP (ref 150–400)
RBC # BLD: 4.42 M/UL — SIGNIFICANT CHANGE UP (ref 3.8–5.2)
RBC # FLD: 15.8 % — HIGH (ref 10.3–14.5)
WBC # BLD: 5 K/UL — SIGNIFICANT CHANGE UP (ref 3.8–10.5)
WBC # FLD AUTO: 5 K/UL — SIGNIFICANT CHANGE UP (ref 3.8–10.5)

## 2023-03-20 PROCEDURE — 99213 OFFICE O/P EST LOW 20 MIN: CPT

## 2023-03-21 LAB
FERRITIN SERPL-MCNC: 22 NG/ML
IRON SATN MFR SERPL: 21 %
IRON SERPL-MCNC: 80 UG/DL
TIBC SERPL-MCNC: 378 UG/DL
UIBC SERPL-MCNC: 298 UG/DL

## 2023-03-23 NOTE — ASSESSMENT
[FreeTextEntry1] : 83yo F with HTN, HLD, hypothyroidism here for further management of iron deficiency anemia. \par She saw Dr. Santosh LÓPEZ who is planning to do an endoscopy. He prescribed pt iron supplements, patient started after last visit with us, tolerated iron pill well. She had EDG done last week, iron pill was hold since last week, will have capsule study next week. She worried that holding iron pill will drop her hgb hence come today for count check. \par We reviewed CBC today, hgb 12.4 improved. She had been off on Slow-Fe once daily for the past month per GI Dr. Gardiner, he will consider colonoscopy if Hgb further trending down. Will f/u today's iron study result\par Advised her to cont iron supplementation Slow-Fe. If she develops intolerable side effects such as constipation or upsetting stomach, we discussed doing iron intravenously. Pt agreeable\par Will have her come back in 3 mo to repeat labs\par All questions answered\par RTC 3 mo.\par \par \par Case and management discussed with Dr. Ritter\par

## 2023-03-23 NOTE — HISTORY OF PRESENT ILLNESS
[de-identified] : 81yo F with HTN, HLD, hypothyroidism here for further management of iron deficiency anemia. \par \par Pt reports that in March, she was hospitalized at [facility] after a syncopal episode, found to have Na 120. She was taken off diuretics. She follows closely with cardiologist at [facility] -notes that he called her in Sept b/c Hgb dropped. She was hospitalized again, this time at [place], in Sept for a TIA, started on Plavix in addition to ASA. \par 10/7/22 showed iron 17, TIBC 482, TSAT 4%, ferritin 7\par She saw Dr. Santosh LÓPEZ who is planning to do an endoscopy. He prescribed pt iron supplements but she hasn't started them yet. \par \par She gets cold easily but otherwise feels well.  [de-identified] : After last visit on 10/27/22, pt started on iron supplement, tolerated it well. She had EDG done 1 wk ago no obvious bleeding per pt; she had a capsule study scheduled on 11/21 next week, she has been hold iron pill since last Monday. She felt cold dizzy as usual. She worried that holding iron pill may make her hgb drop, she called our office to check her CBC today. \par Her BP is high today, she is on BP medication nacinopril 20mg metroprolol hydralzine 50mg BID, she is very anxious about Hgb dropping. \par \par She had EDG and capsule study last Nov, GI Dr. Willis said she had inflammation in the stomach but no bleeding issues. \par She was advised to be off iron supplement (Slow Fe once daily) for 1 months. if hgb going down then he will do colonoscopy. She has been off Slow Fe for 1 month now. \par Her last clonosocpy was done  2 years ago w/no bleeding.

## 2023-04-10 ENCOUNTER — APPOINTMENT (OUTPATIENT)
Dept: PULMONOLOGY | Facility: CLINIC | Age: 83
End: 2023-04-10

## 2023-05-24 ENCOUNTER — APPOINTMENT (OUTPATIENT)
Dept: PULMONOLOGY | Facility: CLINIC | Age: 83
End: 2023-05-24
Payer: MEDICARE

## 2023-05-24 VITALS — OXYGEN SATURATION: 98 % | DIASTOLIC BLOOD PRESSURE: 70 MMHG | HEART RATE: 68 BPM | SYSTOLIC BLOOD PRESSURE: 147 MMHG

## 2023-05-24 PROCEDURE — 94727 GAS DIL/WSHOT DETER LNG VOL: CPT

## 2023-05-24 PROCEDURE — 94010 BREATHING CAPACITY TEST: CPT

## 2023-05-24 PROCEDURE — 99214 OFFICE O/P EST MOD 30 MIN: CPT | Mod: 25

## 2023-05-24 PROCEDURE — 94729 DIFFUSING CAPACITY: CPT

## 2023-05-24 PROCEDURE — ZZZZZ: CPT

## 2023-05-24 NOTE — DISCUSSION/SUMMARY
[FreeTextEntry1] : Shortness of breath exertional dyspnea chest congestion active\par There is no decline of the pulmonary physiology follow-up 3 months and if continued decline reevaluate for chest CT scan\par r/o sec  anemia on Plavix and ASP\par anemia w/u possible HEME  GI \par \par post hyponatremia\par per cardiology on daily lasix 40 mg\par "Impaction distal bronchi\par Obstructive airway disease\par Mild decline pulmonary physiology- restart Trelegy Elipita- sample dose 200 mcg dose\par Post TIA on Plavix management neurology follow-up cardiology\par Other history as noted above\par Vaccination and pneumococcal profile up to date\par Advised if needs to use should notify us for evaluation\par [facility] cancer screening\par F/U [facility] Colonoscopyendoscopy up to  date\par Spinal disease  \par PFIZER  completed X 3 management of boosters including:  valent variant\par Flu  vaccine up to date\par  cardiology  management of blood pressure\par Based historically on the severe low sodium with syncope patient is hesitant to restart any diuretic even with the noted small left pleural effusion above\par Will renew Xanax\par Advised if still not feeling well report immediately to the emergency department at [place] or [place] for reevaluation\par Discussed with PMD\par  with holding of diuretic pos  decline of pulmonary fx\par off resp controller therapy  as noted above RS\par

## 2023-05-24 NOTE — PROCEDURE
[FreeTextEntry1] : PFT May 24, 2023\par Mild restrictive ventilatory impairment\par Very mild obstructive component with FEV1 FVC ratio 76\par No air trapping\par Diffusion with mild borderline moderate reduction 61% predicted loss functioning alveolar capillary units\par Hemoglobin 12.4\par Data comparison to prior PFT September 7, 2022 there is decline at the TLC and minimal decline at the diffusion with stable FEV1\par Clinical correlation\par Patient asymptomatic\par Follow-up 3 months if continued decline of pulmonary function testing evaluate CT chest\par \par Nahum No BD 1/9/23\par mild reduction flow rates\par  ratio 66 Mild OAD\par \par Nahum No BD 10/7/22\par severe reduction flow rates\par  pos decline\par HGB 7.0\par \par X-ray PA lateral October 7, 2022 indication chest congestion\par Borderline cardiomegaly\par Mild calcification aortic knob\par No parenchymal infiltrates pleural effusions dominant pulmonary nodules\par Thinning vertebral\par  spine\par Mild increased retrosternal airspace on lateral view\par No gross interval change compared to chest x-ray of July 2022\par \par PFT 9/7/2022\par Moderate OAD\par Lung Volumes nl\par DLCO 66 % with mild loss fx  alveolar  capillary units\par  HGB 12.0\par NIOX  8  ppb WNL 9/7/2022\par stable pulmonary physiology\par \par PFT June 8, 2022\par Mild obstructive ventilatory impairment\par Significant 25% response to bronchodilator at the FEV1\par Lung volumes normal\par Total lung capacity 89% predicted\par Diffusion low normal range 70% predicted\par Hemoglobin 10.7\par Data comparison to April 22, 2022 demonstrates stable FVC with just greater than 200 cc interval improvement at the FEV1\par \par Chest x-ray PA lateral june 8 2022\par Borderline cardiomegaly\par Right lateral scoliosis\par Lung fields are clear\par No evidence of cephalization or congestion\par No interstitial changes\par Costophrenic angles are clear\par No recurrence of pleural effusion\par \par Pulmonary exercise study 6/8/22\par RA study\par  neg desaturation\par \par Chest x-ray PA lateral May 6, 2022\par Cardiomegaly\par Right lung is clear\par No evidence for a right pleural effusion\par Left costophrenic angle blunted with some component of atelectasis\par Comparison to chest x-ray of April 22, 2022 which demonstrated a very minimal scar atelectatic changes with possible left pleural effusion although differences in technique does suggest some level of improvement clinical correlate with exam with no crackles or auscultated at today's visit\par \par Chest x-ray PA lateral\par April 22, 2022\par Blunting left costophrenic angle\par Patient's x-ray was done with overlying draw\par Difficult to assess\par No clear parenchymal infiltrate or pleural effusion\par Rule out increase shortness of breath secondary to congestive heart failure\par The left pleural effusion is new\par Compared to a chest x-ray of January 13, 2021\par \par NAHUM 4/22/22\par Mod severe OAD\par \par PFT 10/20/21\par Flow rates nl\par  minimal OAD ratio 78\par Lung Volumes nl\par  DLCO  78 %\par HGB 11.5\par \par PFT 4/15/21\par Mild  borderline  moderate  OAD\par Lung volumes normal range\par DLCO  83 %\par HGGB 10.4\par NIOX  12  Nl range  4/15/21\par \par PFT 1/13/21\par mild  mod reduction flow rates\par Moderate OAD\par Normal lung volumes\par Normal DLCO 79 % pred noted mild decline pulmonary physiology\par \par X-ray PA lateral projection January 13, 2021\par Borderline cardiomegaly\par Right lateral scoliosis\par Lung fields otherwise clear without parenchymal infiltrate pleural effusion or dominant pulmonary nodules\par Soft tissue bony structures otherwise grossly unremarkable\par Impression clear lungs no gross evidence for rib fractures or infiltrates\par No interval change compared to chest x-ray July 15, 2020\par \par Chest x-ray PA lateral July 15, 2020\par Borderline cardiomegaly\par Hyperaeration\par Clear lungs\par No parenchymal infiltrate pleural effusions or dominant pulmonary nodules\par No interval change compared to chest x-ray of December 20, 2019\par \par PFT Body BOX July 10 2020\par mild OAD\par  No BD at FEV1\par Normal lung volumes\par  sp conductance and resistance normal\par Diffusion 80 % pred normal\par  HGB 13.0 \par \par Spirometry March 9, 2020\par Mild obstructive ventilatory impairment\par No bronchodilator response at FEV1\par Stable FEV1\par \par Chest x-ray PA lateral December 20, 2019\par Normal cardiac size\par Mild right lateral scoliosis\par Suggestion of some bronchiectatic changes at right lower lung zone\par This is unchanged compared to the recent chest x-ray of December 16, 2019\par No consolidation consistent with active pneumonia\par \par States Flu vaccination up-to-date\par History of pneumococcal vaccination and Prevnar administered\par \par PFT December 16, 2019\par Mild obstructive ventilatory impairment\par No response to bronchodilator at the FEV1\par Lung volumes are normal with total capacity 95% predicted.\par Diffusion relatively normal 78% of predicted.\par Hemoglobin 13.7\par \par Chest x-ray PA lateral December 16 2019\par Mild cardiomegaly\par Left lower lung zone minimal discoid linear atelectatic change\par No parenchymal infiltrates pleural effusions dominant pulmonary nodules\par Soft tissue bony structures grossly normal\par Impression no evidence of pneumonia\par \par Data review 12/21/2019\par Serum sodium 134 with serum chloride 93 normal renal function\par CBC White blood count 7.37 hemoglobin 14.3 hematocrit 43.4\par Procalcitonin negative\par RVP positive influenza A\par \par CT chest December 26, 2019\par Right mid lower lung zone tree-in-bud with centrilobular groundglass nodular opacities most consistent with impacted distal airways\par \par Blood draw\par \par HD Flu vaccine 9/7/2022

## 2023-05-24 NOTE — HISTORY OF PRESENT ILLNESS
[Stable] : are stable [None] : ~He/She~ has no significant interval events [Difficulty Breathing During Exertion] : denies dyspnea on exertion [Feelings Of Weakness On Exertion] : denies exercise intolerance [Cough] : denies coughing [Wheezing] : denies wheezing [Regional Soft Tissue Swelling Both Lower Extremities] : denies lower extremity edema [Chest Pain Or Discomfort] : denies chest pain [Fever] : denies fever [Never] : never [TextBox_4] : allergic  Rtx flowers  2 weeks  ago and txed Urgent  care\par \par states card\par off lasix sec low Na but at present OFF HCTZ\par Lisinpril 20 mg BID\par  Metoprol 25 BID\par  Norvasc 2.5 \par post visit Cardiology [facility] no edema\par \par GI virus management with Dr Frost\par HEME noted completed EGD and capsule study \par Anemia (285.9) (D64.9)\par 81yo F with HTN, HLD, hypothyroidism here for further management of iron deficiency anemia. \par \par Noted Renal Dr Rico to manage hyponatremia\par notes off diuretic some inc SOB\par pos croupy cough pos mucous green\par no fever chills \par noted per cardiac HGB 10.0\par seen GI Héctor Frost recommended endoscopy but at present at Plavix\par Hospital ECHO at [facility]  9/26/2\par EF > 75 %\par no reported valve disease or Pul HTN\par \par Post hospitalization [facility] 10/30/2022\par Get MRI told it was possible TIA a and was placed on Plavix\par Follow-up neurology cardiology renal noted that she was off her diuretic and the serum sodium was 135 in the hospital\par pos SOB DODGE\par HTN  rx meds Hydralazine lisinopril Metoprolol\par \par March [facility] review of imaging studies\par March 16, 2022\par CT pelvis\par No fractures\par Lumbar disc disease\par Moderate to severe spinal stenosis\par CT cervical spine and head negative\par X-ray of hip March 14, 2022 no displaced fracture\par No chest x-ray reviewed available for review\par \par Discharge medication reviewed\par Aspirin 81 mg\par Density 200 mg\par 1 deficit\par Zofran\par Pantoprazole 40 mg\par Hydralazine 50 mg 3 times daily\par As needed Xanax\par Gabapentin 600 mg 3 times daily\par Lisinopril 20 mg twice daily\par Metoprolol 25 mg twice daily\par Was was also treated with nitrofurantoin\par \par \par completed PFIZER  COVID vaccine [Wt Gain ___ Lbs] : no recent weight gain [Wt Loss ___ Lbs] : no recent weight loss [Oxygen] : the patient uses no supplemental oxygen

## 2023-05-24 NOTE — CONSULT LETTER
[Dear  ___] : Dear  [unfilled], [Courtesy Letter:] : I had the pleasure of seeing your patient, [unfilled], in my office today. [Please see my note below.] : Please see my note below. [Sincerely,] : Sincerely, [FreeTextEntry3] : Bharat Arguelles D.O., JYOTI\par  of Medicine\par [employer] [facility] [employer]\par

## 2023-06-07 ENCOUNTER — OUTPATIENT (OUTPATIENT)
Dept: OUTPATIENT SERVICES | Facility: HOSPITAL | Age: 83
LOS: 1 days | Discharge: HOME | End: 2023-06-07

## 2023-06-07 DIAGNOSIS — D50.9 IRON DEFICIENCY ANEMIA, UNSPECIFIED: ICD-10-CM

## 2023-06-09 NOTE — ED PROVIDER NOTE - TEMPLATE
Social Work Follow up Note     received e-mail from pt with attached [facility] FA application.  Application with missing required information.     SW sent e-mail to pt.  SW notified pt she must provide required information on the application form (estimated annual income and estimated last 3 months income).  Pt to complete application and send to SW via e-mail.    SW to submit application to [facility] SBO when available.    SW to follow up.    THEO Woodard  [facility]   [phone]     [place]

## 2023-06-19 ENCOUNTER — RESULT REVIEW (OUTPATIENT)
Age: 83
End: 2023-06-19

## 2023-06-19 ENCOUNTER — APPOINTMENT (OUTPATIENT)
Dept: HEMATOLOGY ONCOLOGY | Facility: CLINIC | Age: 83
End: 2023-06-19
Payer: MEDICARE

## 2023-06-19 VITALS
BODY MASS INDEX: 23.22 KG/M2 | HEART RATE: 50 BPM | TEMPERATURE: 96.4 F | WEIGHT: 132.72 LBS | OXYGEN SATURATION: 98 % | RESPIRATION RATE: 16 BRPM | DIASTOLIC BLOOD PRESSURE: 67 MMHG | SYSTOLIC BLOOD PRESSURE: 172 MMHG

## 2023-06-19 DIAGNOSIS — R23.3 SPONTANEOUS ECCHYMOSES: ICD-10-CM

## 2023-06-19 LAB
BASOPHILS # BLD AUTO: 0.04 K/UL — SIGNIFICANT CHANGE UP (ref 0–0.2)
BASOPHILS NFR BLD AUTO: 0.6 % — SIGNIFICANT CHANGE UP (ref 0–2)
EOSINOPHIL # BLD AUTO: 0.06 K/UL — SIGNIFICANT CHANGE UP (ref 0–0.5)
EOSINOPHIL NFR BLD AUTO: 0.9 % — SIGNIFICANT CHANGE UP (ref 0–6)
HCT VFR BLD CALC: 37.1 % — SIGNIFICANT CHANGE UP (ref 34.5–45)
HGB BLD-MCNC: 12.2 G/DL — SIGNIFICANT CHANGE UP (ref 11.5–15.5)
IMM GRANULOCYTES NFR BLD AUTO: 0.6 % — SIGNIFICANT CHANGE UP (ref 0–0.9)
LYMPHOCYTES # BLD AUTO: 0.74 K/UL — LOW (ref 1–3.3)
LYMPHOCYTES # BLD AUTO: 10.8 % — LOW (ref 13–44)
MCHC RBC-ENTMCNC: 31 PG — SIGNIFICANT CHANGE UP (ref 27–34)
MCHC RBC-ENTMCNC: 32.9 G/DL — SIGNIFICANT CHANGE UP (ref 32–36)
MCV RBC AUTO: 94.4 FL — SIGNIFICANT CHANGE UP (ref 80–100)
MONOCYTES # BLD AUTO: 0.57 K/UL — SIGNIFICANT CHANGE UP (ref 0–0.9)
MONOCYTES NFR BLD AUTO: 8.3 % — SIGNIFICANT CHANGE UP (ref 2–14)
NEUTROPHILS # BLD AUTO: 5.43 K/UL — SIGNIFICANT CHANGE UP (ref 1.8–7.4)
NEUTROPHILS NFR BLD AUTO: 78.8 % — HIGH (ref 43–77)
NRBC # BLD: 0 /100 WBCS — SIGNIFICANT CHANGE UP (ref 0–0)
PLATELET # BLD AUTO: 178 K/UL — SIGNIFICANT CHANGE UP (ref 150–400)
RBC # BLD: 3.93 M/UL — SIGNIFICANT CHANGE UP (ref 3.8–5.2)
RBC # FLD: 13.2 % — SIGNIFICANT CHANGE UP (ref 10.3–14.5)
WBC # BLD: 6.88 K/UL — SIGNIFICANT CHANGE UP (ref 3.8–10.5)
WBC # FLD AUTO: 6.88 K/UL — SIGNIFICANT CHANGE UP (ref 3.8–10.5)

## 2023-06-19 PROCEDURE — 99214 OFFICE O/P EST MOD 30 MIN: CPT

## 2023-06-19 NOTE — HISTORY OF PRESENT ILLNESS
[de-identified] : 83yo F with HTN, HLD, hypothyroidism here for further management of iron deficiency anemia. \par \par Pt reports that in March, she was hospitalized at [facility] after a syncopal episode, found to have Na 120. She was taken off diuretics. She follows closely with cardiologist at [facility] -notes that he called her in Sept b/c Hgb dropped. She was hospitalized again, this time at [facility], in Sept for a TIA, started on Plavix in addition to ASA. \par 10/7/22 showed iron 17, TIBC 482, TSAT 4%, ferritin 7\par She saw Dr. Santosh LÓPEZ who is planning to do an endoscopy. He prescribed pt iron supplements but she hasn't started them yet. \par \par She gets cold easily but otherwise feels well.  [de-identified] : After last visit on 10/27/22, pt started on iron supplement, tolerated it well. She had EDG done 1 wk ago no obvious bleeding per pt; she had a capsule study scheduled on 11/21 next week, she has been hold iron pill since last Monday. She felt cold dizzy as usual. She worried that holding iron pill may make her hgb drop, she called our office to check her CBC today. \par Her BP is high today, she is on BP medication nacinopril 20mg metroprolol hydralzine 50mg BID, she is very anxious about Hgb dropping. \par \par She had EDG and capsule study last Nov, GI Dr. Willis said she had inflammation in the stomach but no bleeding issues. \par She was advised to be off iron supplement (Slow Fe once daily) for 1 months. if hgb going down then he will do colonoscopy. She has been off Slow Fe for 1 month now. \par \par She had diarrhea in April. Had colonoscopy done on 4/24/23 which showed lymphocytic colitis. She was started on budesonide which has improved the diarrhea. \par She is also complaining of increased bruising since Oct when she had a ? stroke. She is on ASA long term and they added Plavix after. It was stopped in Jan for the increased bruising but those have not subsided. \par She is taking cranberry capsules and probiotics.

## 2023-06-19 NOTE — ASSESSMENT
[FreeTextEntry1] : 81yo F with HTN, HLD, hypothyroidism here for further management of iron deficiency anemia. \par Hgb 12.2 today. Advised her to cont iron supplementation Slow-Fe. \par She had recent colonoscopy showing lymphocytic colitis, started on budesonide\par Pt c/o easy bruising which started in Oct when she started Plavix. Plavix has since been held but she continues having easy bruising. She remains on ASA. Plt count normal at 178k. Will check coags today\par f/u CMP, iron studies\par All questions answered\par RTC 4-6 mo.\par \par

## 2023-06-19 NOTE — PHYSICAL EXAM
[Normal] : affect appropriate [de-identified] : multiple ecchymoses on b/l LE and UE. Pt notes have some on neck previously

## 2023-06-19 NOTE — REVIEW OF SYSTEMS
[Negative] : Allergic/Immunologic [Easy Bleeding] : no tendency for easy bleeding [Easy Bruising] : a tendency for easy bruising

## 2023-06-23 LAB
APTT BLD: 26.8 SEC
FERRITIN SERPL-MCNC: 20 NG/ML
INR PPP: 0.96 RATIO
IRON SATN MFR SERPL: 17 %
IRON SERPL-MCNC: 66 UG/DL
PT BLD: 11.2 SEC
TIBC SERPL-MCNC: 385 UG/DL
UIBC SERPL-MCNC: 319 UG/DL
VIT C SERPL-MCNC: 2 MG/DL

## 2023-07-06 ENCOUNTER — APPOINTMENT (OUTPATIENT)
Dept: UROGYNECOLOGY | Facility: CLINIC | Age: 83
End: 2023-07-06
Payer: MEDICARE

## 2023-07-06 DIAGNOSIS — N30.90 CYSTITIS, UNSPECIFIED W/OUT HEMATURIA: ICD-10-CM

## 2023-07-06 PROCEDURE — 99214 OFFICE O/P EST MOD 30 MIN: CPT | Mod: 25

## 2023-07-06 PROCEDURE — 51701 INSERT BLADDER CATHETER: CPT

## 2023-07-06 RX ORDER — CLOTRIMAZOLE AND BETAMETHASONE DIPROPIONATE 10; .5 MG/G; MG/G
1-0.05 CREAM TOPICAL
Qty: 1 | Refills: 3 | Status: ACTIVE | COMMUNITY
Start: 2023-07-06 | End: 1900-01-01

## 2023-07-06 NOTE — PROCEDURE
[Straight Catheterization] : insertion of a straight catheter [Urinary Tract Infection] : a urinary tract infection [Patient] : the patient [Allergies Reviewed] : Allergies reviewed [___ Fr Straight Tip] : a [unfilled] in [place] straight tip catheter [None] : there were no complications with the catheter insertion [Clear] : clear [Culture] : culture [No Complications] : no complications [Tolerated Well] : the patient tolerated the procedure well [Post procedure instructions and information given] : Post procedure instructions and information were given and reviewed with patient. [0] : 0 [FreeTextEntry1] : PVR 30 cc\par

## 2023-07-06 NOTE — HISTORY OF PRESENT ILLNESS
[FreeTextEntry1] : \par Patient with a long history of frequency urgency and recurrent UTIs numerous cultures are positive some with catheterized specimen some without.  She has a frequency urgency pain syndrome which seems to be more about prophylaxis of UTIs than true interstitial cystitis which she believes she may have\par \par Her chart history reviewed in detail shows that when she is on estrogen alternating with clotrimazole and betamethasone with very low-dose prophylactic nitrofurantoin she does quite well\par \par Infections are marked with an increased frequency and dysuria but they never have her with systemic symptoms and never required hospitalization.\par \par She recently had a specimen taken by urology and is still on nitrofurantoin twice a day presently for a positive culture and therefore no specimen was taken today.\par \par I strongly recommended that she continue with urology for once yearly cystoscopy which is done ever since she had a precancerous neurologic biopsy 20 years ago.  I have also clarified that if urology obtains a specimen they are capable and should be the ones treating that infection and that I would not be reviewing a urologist specimens for treatment decisions.\par \par I have clarified that I am in this office once weekly but if she has an acute infection and she calls and request for nurse to take a specimen we will do our best to accommodate her.\par \par Recommendations\par \par \par Vitamin C 1000 mg daily\par Cranberry tablets 400mg twice a day. \par \par \par NEW Prescription nitrofurantoin 50 mg oral tablet  one  time  daily preventative treatment for UTI:   Risks, benefits, adverse reactions reviewed  regarding medication and risks related to prophylactic treatment.\par \par \par Continue: Estrogen cream- finger application one inch vaginal 3 times a week AT NIGHT. Risks, benefits, adverse reaction including risks and date related to hormones and Breast cancer reviewed in detail in lay terms.  Patient has estrogen cream.  Not prescribed today.\par \par New prescription: Clotrimazole/betamethasone cream on alternating nights alternating with estrogen cream\par \par \par NEW: START ZINC OXIDE/ PETROLEUM/ 1% hydrocortisone crm.  : GENEROUS APPLICATION INTROITUS AND DISTAL VAGINA ALTERNATE NIGHTS FROM ESTROGEN USE. \par \par The above regiment was very effective twice in her many year history under our care and we will follow-up with her as needed\par No specimen today:\par Next visit if symptomatic catheterized urine specimen otherwise no examination\par \par \par Review of previous notes, labs, current prescriptions was performed.\par History , Physical counseling was performed. \par All questions answered in lay language\par Total time for encounter was  56     min\par A chaperone was present for the entirety of the encounter\par \par

## 2023-07-13 ENCOUNTER — APPOINTMENT (OUTPATIENT)
Dept: INTERNAL MEDICINE | Facility: CLINIC | Age: 83
End: 2023-07-13
Payer: MEDICARE

## 2023-07-13 VITALS
WEIGHT: 130 LBS | RESPIRATION RATE: 14 BRPM | HEART RATE: 69 BPM | DIASTOLIC BLOOD PRESSURE: 75 MMHG | HEIGHT: 63 IN | TEMPERATURE: 98 F | SYSTOLIC BLOOD PRESSURE: 170 MMHG | OXYGEN SATURATION: 99 % | BODY MASS INDEX: 23.04 KG/M2

## 2023-07-13 VITALS — SYSTOLIC BLOOD PRESSURE: 138 MMHG | DIASTOLIC BLOOD PRESSURE: 88 MMHG

## 2023-07-13 DIAGNOSIS — E03.9 HYPOTHYROIDISM, UNSPECIFIED: ICD-10-CM

## 2023-07-13 DIAGNOSIS — K52.832 LYMPHOCYTIC COLITIS: ICD-10-CM

## 2023-07-13 DIAGNOSIS — J10.1 INFLUENZA DUE TO OTHER IDENTIFIED INFLUENZA VIRUS WITH OTHER RESPIRATORY MANIFESTATIONS: ICD-10-CM

## 2023-07-13 DIAGNOSIS — I10 ESSENTIAL (PRIMARY) HYPERTENSION: ICD-10-CM

## 2023-07-13 PROCEDURE — 99214 OFFICE O/P EST MOD 30 MIN: CPT | Mod: 25

## 2023-07-13 PROCEDURE — 36415 COLL VENOUS BLD VENIPUNCTURE: CPT

## 2023-07-13 RX ORDER — LEVOTHYROXINE SODIUM 75 UG/1
75 TABLET ORAL
Qty: 90 | Refills: 3 | Status: ACTIVE | COMMUNITY

## 2023-07-13 RX ORDER — LISINOPRIL 20 MG/1
20 TABLET ORAL
Qty: 180 | Refills: 3 | Status: ACTIVE | COMMUNITY

## 2023-07-13 RX ORDER — LISINOPRIL AND HYDROCHLOROTHIAZIDE TABLETS 20; 25 MG/1; MG/1
20-25 TABLET ORAL
Qty: 90 | Refills: 0 | Status: DISCONTINUED | COMMUNITY
Start: 2018-02-26 | End: 2023-07-13

## 2023-07-13 RX ORDER — CELECOXIB 200 MG/1
200 CAPSULE ORAL
Qty: 30 | Refills: 0 | Status: DISCONTINUED | COMMUNITY
Start: 2018-12-10 | End: 2023-07-13

## 2023-07-13 RX ORDER — IPRATROPIUM BROMIDE 21 UG/1
0.03 SPRAY NASAL
Qty: 30 | Refills: 3 | Status: DISCONTINUED | COMMUNITY
Start: 2020-01-14 | End: 2023-07-13

## 2023-07-13 RX ORDER — TRIMETHOPRIM 100 MG/1
100 TABLET ORAL
Qty: 30 | Refills: 2 | Status: DISCONTINUED | COMMUNITY
Start: 2021-09-02 | End: 2023-07-13

## 2023-07-13 RX ORDER — ESTRADIOL 10 UG/1
10 TABLET, FILM COATED VAGINAL
Qty: 24 | Refills: 0 | Status: DISCONTINUED | COMMUNITY
Start: 2018-12-17 | End: 2023-07-13

## 2023-07-13 RX ORDER — CLOPIDOGREL 75 MG/1
75 TABLET, FILM COATED ORAL
Qty: 90 | Refills: 1 | Status: COMPLETED | COMMUNITY
Start: 2022-10-07 | End: 2023-02-13

## 2023-07-13 RX ORDER — AMLODIPINE BESYLATE 2.5 MG/1
2.5 TABLET ORAL
Qty: 180 | Refills: 3 | Status: ACTIVE | COMMUNITY

## 2023-07-13 RX ORDER — GABAPENTIN 600 MG/1
600 TABLET, COATED ORAL
Qty: 180 | Refills: 0 | Status: ACTIVE | COMMUNITY

## 2023-07-13 RX ORDER — NITROFURANTOIN MACROCRYSTALS 50 MG/1
50 CAPSULE ORAL
Qty: 90 | Refills: 1 | Status: DISCONTINUED | COMMUNITY
Start: 2021-10-04 | End: 2023-07-13

## 2023-07-13 RX ORDER — METOPROLOL TARTRATE 25 MG/1
25 TABLET, FILM COATED ORAL
Qty: 180 | Refills: 0 | Status: ACTIVE | COMMUNITY
Start: 2018-10-18

## 2023-07-13 RX ORDER — FUROSEMIDE 20 MG/1
20 TABLET ORAL
Qty: 3 | Refills: 0 | Status: DISCONTINUED | COMMUNITY
Start: 2022-05-13 | End: 2023-07-13

## 2023-07-13 RX ORDER — ESTRADIOL 0.1 MG/G
0.1 CREAM VAGINAL
Qty: 4 | Refills: 3 | Status: DISCONTINUED | COMMUNITY
Start: 2021-06-03 | End: 2023-07-13

## 2023-07-13 RX ORDER — METRONIDAZOLE 7.5 MG/G
0.75 GEL VAGINAL
Qty: 70 | Refills: 0 | Status: DISCONTINUED | COMMUNITY
Start: 2018-10-18 | End: 2023-07-13

## 2023-07-13 RX ORDER — CLOTRIMAZOLE AND BETAMETHASONE DIPROPIONATE 10; .5 MG/G; MG/G
1-0.05 CREAM TOPICAL
Qty: 45 | Refills: 0 | Status: DISCONTINUED | COMMUNITY
Start: 2019-01-29 | End: 2023-07-13

## 2023-07-13 RX ORDER — BUDESONIDE 3 MG/1
3 CAPSULE, COATED PELLETS ORAL DAILY
Qty: 30 | Refills: 0 | Status: ACTIVE | COMMUNITY

## 2023-07-13 RX ORDER — ROSUVASTATIN CALCIUM 5 MG/1
5 TABLET, FILM COATED ORAL
Qty: 90 | Refills: 3 | Status: ACTIVE | COMMUNITY

## 2023-07-13 RX ORDER — FLUTICASONE PROPIONATE 50 UG/1
50 SPRAY, METERED NASAL
Qty: 16 | Refills: 0 | Status: DISCONTINUED | COMMUNITY
Start: 2019-12-14 | End: 2023-07-13

## 2023-07-13 RX ORDER — FOSFOMYCIN TROMETHAMINE 3 G/1
3 POWDER ORAL
Qty: 1 | Refills: 0 | Status: DISCONTINUED | COMMUNITY
Start: 2022-05-02 | End: 2023-07-13

## 2023-07-13 RX ORDER — GABAPENTIN 100 MG
100 TABLET ORAL
Qty: 180 | Refills: 3 | Status: ACTIVE | COMMUNITY

## 2023-07-13 RX ORDER — SULFAMETHOXAZOLE AND TRIMETHOPRIM 800; 160 MG/1; MG/1
800-160 TABLET ORAL TWICE DAILY
Qty: 6 | Refills: 0 | Status: DISCONTINUED | COMMUNITY
Start: 2022-05-02 | End: 2023-07-13

## 2023-07-13 RX ORDER — ROSUVASTATIN CALCIUM 10 MG/1
10 TABLET, FILM COATED ORAL
Qty: 90 | Refills: 0 | Status: DISCONTINUED | COMMUNITY
Start: 2017-11-06 | End: 2023-07-13

## 2023-07-13 RX ORDER — CHROMIUM 200 MCG
1000 TABLET ORAL
Refills: 0 | Status: DISCONTINUED | COMMUNITY
End: 2023-07-13

## 2023-07-13 RX ORDER — BIOTIN 10 MG
10000 TABLET ORAL
Refills: 0 | Status: DISCONTINUED | COMMUNITY
End: 2023-07-13

## 2023-07-13 RX ORDER — ESTRADIOL 0.1 MG/G
0.1 CREAM VAGINAL
Qty: 42.5 | Refills: 0 | Status: DISCONTINUED | COMMUNITY
Start: 2019-01-29 | End: 2023-07-13

## 2023-07-13 NOTE — PHYSICAL EXAM
[Normal] : normal rate, regular rhythm, normal S1 and S2 and no murmur heard [No Edema] : there was no peripheral edema [Soft] : abdomen soft [Non Tender] : non-tender [No Rash] : no rash [Non-distended] : non-distended [Normal Gait] : normal gait [Normal Mood] : the mood was normal [de-identified] : very friendly [de-identified] : BLE varicosities

## 2023-07-13 NOTE — PLAN
[FreeTextEntry1] : HTN - BP today is acceptable. Continue Amlodipine, Lisinopril, and Metoprolol. \par \par CAD - stable, followed by cardiology. Continue Crestor. Check lipid panel and A1c.\par \par Hx of hyponatremia due to HCTZ, sodium level now normal. Check CMP.\par \par Recurrent UTI - awaiting urine culture report from urologist. Also followed with urogynecologist, was advised to take Macrobid 50 mg qd for prophylaxis. I suggest she consider seeing ID for recurrent UTI, recommendation provided. \par \par Hypothyroidism - check TFT's and continue Synthroid 75 mcg daily.\par \par Anemia - Hgb now normal. Followed with hematology Dr. Ritter. Recheck CBC.\par \par Lumbosacral stenosis - controlled on Gabapentin 700 mg BID.\par \par RTO 3 months for physical.

## 2023-07-13 NOTE — HISTORY OF PRESENT ILLNESS
[FreeTextEntry1] : New pt [de-identified] : 82 year old F comes to establish medical care. Previously followed by Dr. Adele Gooden.\par \par She has hx of HTN, CAD s/p PCI x 2, hypothyroidism, TIA (took Plavix x 6 months, recently completed), lumbosacral stenosis, and recurrent UTI. \par She is followed with cardiology Dr. Rey Shin at [facility].\par She takes Lisinopril, Metoprolol, and Amlodipine for HTN. She reports white coat syndrome in medical offices. She had previously been on HCTZ but stopped due to hyponatremia. \par She saw her urologist Dr. Jeb Patterson 2 days ago, urine again + for UTI. Awaiting urine culture results. She saw urogynecology Dr. Reed last week, was prescribed Macrobid 50 mg qd for prophylaxis but did not start it. She had taken daily Macrobid last yr for 6 months.

## 2023-07-15 LAB
ALBUMIN SERPL ELPH-MCNC: 4.5 G/DL
ALP BLD-CCNC: 91 U/L
ALT SERPL-CCNC: 26 U/L
ANION GAP SERPL CALC-SCNC: 11 MMOL/L
AST SERPL-CCNC: 25 U/L
BILIRUB SERPL-MCNC: 0.3 MG/DL
BUN SERPL-MCNC: 11 MG/DL
CALCIUM SERPL-MCNC: 9.5 MG/DL
CHLORIDE SERPL-SCNC: 104 MMOL/L
CHOLEST SERPL-MCNC: 190 MG/DL
CO2 SERPL-SCNC: 26 MMOL/L
CREAT SERPL-MCNC: 0.68 MG/DL
EGFR: 87 ML/MIN/1.73M2
ESTIMATED AVERAGE GLUCOSE: 114 MG/DL
GLUCOSE SERPL-MCNC: 88 MG/DL
HBA1C MFR BLD HPLC: 5.6 %
HDLC SERPL-MCNC: 97 MG/DL
LDLC SERPL CALC-MCNC: 73 MG/DL
NONHDLC SERPL-MCNC: 93 MG/DL
POTASSIUM SERPL-SCNC: 4.7 MMOL/L
PROT SERPL-MCNC: 6.4 G/DL
SODIUM SERPL-SCNC: 140 MMOL/L
TRIGL SERPL-MCNC: 119 MG/DL
TSH SERPL-ACNC: 2.14 UIU/ML

## 2023-07-19 ENCOUNTER — NON-APPOINTMENT (OUTPATIENT)
Age: 83
End: 2023-07-19

## 2023-08-03 ENCOUNTER — APPOINTMENT (OUTPATIENT)
Dept: UROGYNECOLOGY | Facility: CLINIC | Age: 83
End: 2023-08-03
Payer: MEDICARE

## 2023-08-03 DIAGNOSIS — R35.0 FREQUENCY OF MICTURITION: ICD-10-CM

## 2023-08-03 DIAGNOSIS — N39.0 URINARY TRACT INFECTION, SITE NOT SPECIFIED: ICD-10-CM

## 2023-08-03 DIAGNOSIS — R39.11 HESITANCY OF MICTURITION: ICD-10-CM

## 2023-08-03 LAB
BILIRUB UR QL STRIP: NEGATIVE
CLARITY UR: CLEAR
COLLECTION METHOD: NORMAL
GLUCOSE UR-MCNC: NEGATIVE
HCG UR QL: 0.2 EU/DL
HGB UR QL STRIP.AUTO: NEGATIVE
KETONES UR-MCNC: NEGATIVE
LEUKOCYTE ESTERASE UR QL STRIP: NEGATIVE
NITRITE UR QL STRIP: NEGATIVE
PH UR STRIP: 6
PROT UR STRIP-MCNC: NEGATIVE
SP GR UR STRIP: 1.02

## 2023-08-03 PROCEDURE — 81003 URINALYSIS AUTO W/O SCOPE: CPT | Mod: QW

## 2023-08-03 PROCEDURE — 99214 OFFICE O/P EST MOD 30 MIN: CPT | Mod: 25

## 2023-08-03 PROCEDURE — 51701 INSERT BLADDER CATHETER: CPT

## 2023-08-03 RX ORDER — NITROFURANTOIN MACROCRYSTALS 50 MG/1
50 CAPSULE ORAL
Qty: 90 | Refills: 1 | Status: ACTIVE | COMMUNITY
Start: 2023-07-06 | End: 1900-01-01

## 2023-08-03 RX ORDER — ESTRADIOL 0.1 MG/G
0.1 CREAM VAGINAL
Qty: 1 | Refills: 3 | Status: ACTIVE | COMMUNITY
Start: 2021-11-15 | End: 1900-01-01

## 2023-08-03 NOTE — HISTORY OF PRESENT ILLNESS
[FreeTextEntry1] : Is a patient with a long history of urinary frequency and recurrent cystitis with numerous cultures positive some with catheterized specimen some without she is followed by urology for history of a precancerous bladder lesion from 20 years ago.  She is on estrogen alternating with clotrimazole and prophylactic nitrofurantoin 50 mg daily. Under urology's recommendation she is on methenamine 1 tablet daily.  Feels she has a UTI presently.   Due to symptoms of hesitancy and suspicion of UTI and a catheterized urine was obtained and the residual volume was  20 cc  Notably the urine analysis was completely negative even in the setting her feeling that she has a UTI and I reeducated her regarding the overlap between atrophy and dysuria versus UTI symptoms which can have identical symptomatology.  She will continue on the current regimen and requested a change in urologist I recommended Dr. Ramone Wahl, who has experience in coordinated care with the female pelvic medicine program at [facility]..abd This patient requires urologic urologist as she had a precancerous lesion number of years ago and needs to be followed.  In addition with my part-time work schedule she did not additional physician to access when she has severe symptomatology on days I am not available  I have renewed estradiol and we counseled her regarding risk benefits adverse reactions.  I have renewed methenamine   Review of previous notes, labs, current prescriptions was performed. History , Physical counseling was performed.  All questions answered in lay language Total time for encounter was   41    min A chaperone was present for the entirety of the encounter .

## 2023-08-08 ENCOUNTER — NON-APPOINTMENT (OUTPATIENT)
Age: 83
End: 2023-08-08

## 2023-08-08 LAB — BACTERIA UR CULT: NORMAL

## 2023-08-28 ENCOUNTER — APPOINTMENT (OUTPATIENT)
Dept: MRI IMAGING | Facility: CLINIC | Age: 83
End: 2023-08-28
Payer: MEDICARE

## 2023-08-28 PROCEDURE — 73221 MRI JOINT UPR EXTREM W/O DYE: CPT | Mod: LT,MH

## 2023-10-12 ENCOUNTER — APPOINTMENT (OUTPATIENT)
Dept: PULMONOLOGY | Facility: CLINIC | Age: 83
End: 2023-10-12

## 2023-10-13 ENCOUNTER — APPOINTMENT (OUTPATIENT)
Dept: UROGYNECOLOGY | Facility: CLINIC | Age: 83
End: 2023-10-13
Payer: MEDICARE

## 2023-10-13 VITALS
DIASTOLIC BLOOD PRESSURE: 74 MMHG | HEART RATE: 88 BPM | SYSTOLIC BLOOD PRESSURE: 140 MMHG | TEMPERATURE: 97.2 F | BODY MASS INDEX: 23.04 KG/M2 | WEIGHT: 130 LBS | HEIGHT: 63 IN

## 2023-10-13 DIAGNOSIS — N95.2 POSTMENOPAUSAL ATROPHIC VAGINITIS: ICD-10-CM

## 2023-10-13 PROCEDURE — 99213 OFFICE O/P EST LOW 20 MIN: CPT

## 2023-10-13 RX ORDER — ESTRADIOL 0.1 MG/G
0.1 CREAM VAGINAL
Qty: 1 | Refills: 3 | Status: ACTIVE | COMMUNITY
Start: 2023-10-13 | End: 1900-01-01

## 2023-10-17 ENCOUNTER — APPOINTMENT (OUTPATIENT)
Dept: INTERNAL MEDICINE | Facility: CLINIC | Age: 83
End: 2023-10-17

## 2023-10-20 ENCOUNTER — APPOINTMENT (OUTPATIENT)
Dept: PULMONOLOGY | Facility: CLINIC | Age: 83
End: 2023-10-20
Payer: MEDICARE

## 2023-10-20 VITALS — DIASTOLIC BLOOD PRESSURE: 70 MMHG | HEART RATE: 55 BPM | OXYGEN SATURATION: 97 % | SYSTOLIC BLOOD PRESSURE: 148 MMHG

## 2023-10-20 DIAGNOSIS — Z23 ENCOUNTER FOR IMMUNIZATION: ICD-10-CM

## 2023-10-20 PROCEDURE — ZZZZZ: CPT

## 2023-10-20 PROCEDURE — 94060 EVALUATION OF WHEEZING: CPT

## 2023-10-20 PROCEDURE — 99214 OFFICE O/P EST MOD 30 MIN: CPT | Mod: 25

## 2023-10-20 PROCEDURE — 94729 DIFFUSING CAPACITY: CPT

## 2023-10-20 PROCEDURE — 94727 GAS DIL/WSHOT DETER LNG VOL: CPT

## 2023-10-20 PROCEDURE — 90662 IIV NO PRSV INCREASED AG IM: CPT

## 2023-10-20 PROCEDURE — 95012 NITRIC OXIDE EXP GAS DETER: CPT

## 2023-10-20 PROCEDURE — G0008: CPT

## 2023-10-20 NOTE — ED PROVIDER NOTE - PMH
No Hashimoto's thyroiditis    Hypertension    Interstitial cystitis    Cortes syndrome    Osteopenia    Spinal stenosis    Torn meniscus

## 2023-10-22 PROBLEM — Z23 ENCOUNTER FOR IMMUNIZATION: Status: ACTIVE | Noted: 2022-09-07

## 2023-10-31 ENCOUNTER — OUTPATIENT (OUTPATIENT)
Dept: OUTPATIENT SERVICES | Facility: HOSPITAL | Age: 83
LOS: 1 days | Discharge: HOME | End: 2023-10-31

## 2023-10-31 DIAGNOSIS — D50.9 IRON DEFICIENCY ANEMIA, UNSPECIFIED: ICD-10-CM

## 2023-11-06 ENCOUNTER — APPOINTMENT (OUTPATIENT)
Dept: HEMATOLOGY ONCOLOGY | Facility: CLINIC | Age: 83
End: 2023-11-06
Payer: MEDICARE

## 2023-11-06 ENCOUNTER — RESULT REVIEW (OUTPATIENT)
Age: 83
End: 2023-11-06

## 2023-11-06 ENCOUNTER — APPOINTMENT (OUTPATIENT)
Dept: HEMATOLOGY ONCOLOGY | Facility: CLINIC | Age: 83
End: 2023-11-06

## 2023-11-06 VITALS
WEIGHT: 134.48 LBS | OXYGEN SATURATION: 96 % | TEMPERATURE: 97.9 F | HEART RATE: 60 BPM | BODY MASS INDEX: 23.82 KG/M2 | SYSTOLIC BLOOD PRESSURE: 170 MMHG | RESPIRATION RATE: 16 BRPM | DIASTOLIC BLOOD PRESSURE: 76 MMHG

## 2023-11-06 DIAGNOSIS — D64.9 ANEMIA, UNSPECIFIED: ICD-10-CM

## 2023-11-06 LAB
BASOPHILS # BLD AUTO: 0.02 K/UL — SIGNIFICANT CHANGE UP (ref 0–0.2)
BASOPHILS # BLD AUTO: 0.02 K/UL — SIGNIFICANT CHANGE UP (ref 0–0.2)
BASOPHILS NFR BLD AUTO: 0.3 % — SIGNIFICANT CHANGE UP (ref 0–2)
BASOPHILS NFR BLD AUTO: 0.3 % — SIGNIFICANT CHANGE UP (ref 0–2)
EOSINOPHIL # BLD AUTO: 0.24 K/UL — SIGNIFICANT CHANGE UP (ref 0–0.5)
EOSINOPHIL # BLD AUTO: 0.24 K/UL — SIGNIFICANT CHANGE UP (ref 0–0.5)
EOSINOPHIL NFR BLD AUTO: 3.3 % — SIGNIFICANT CHANGE UP (ref 0–6)
EOSINOPHIL NFR BLD AUTO: 3.3 % — SIGNIFICANT CHANGE UP (ref 0–6)
HCT VFR BLD CALC: 38.2 % — SIGNIFICANT CHANGE UP (ref 34.5–45)
HCT VFR BLD CALC: 38.2 % — SIGNIFICANT CHANGE UP (ref 34.5–45)
HGB BLD-MCNC: 12.7 G/DL — SIGNIFICANT CHANGE UP (ref 11.5–15.5)
HGB BLD-MCNC: 12.7 G/DL — SIGNIFICANT CHANGE UP (ref 11.5–15.5)
IMM GRANULOCYTES NFR BLD AUTO: 0.3 % — SIGNIFICANT CHANGE UP (ref 0–0.9)
IMM GRANULOCYTES NFR BLD AUTO: 0.3 % — SIGNIFICANT CHANGE UP (ref 0–0.9)
LYMPHOCYTES # BLD AUTO: 1.32 K/UL — SIGNIFICANT CHANGE UP (ref 1–3.3)
LYMPHOCYTES # BLD AUTO: 1.32 K/UL — SIGNIFICANT CHANGE UP (ref 1–3.3)
LYMPHOCYTES # BLD AUTO: 18.3 % — SIGNIFICANT CHANGE UP (ref 13–44)
LYMPHOCYTES # BLD AUTO: 18.3 % — SIGNIFICANT CHANGE UP (ref 13–44)
MCHC RBC-ENTMCNC: 31 PG — SIGNIFICANT CHANGE UP (ref 27–34)
MCHC RBC-ENTMCNC: 31 PG — SIGNIFICANT CHANGE UP (ref 27–34)
MCHC RBC-ENTMCNC: 33.2 G/DL — SIGNIFICANT CHANGE UP (ref 32–36)
MCHC RBC-ENTMCNC: 33.2 G/DL — SIGNIFICANT CHANGE UP (ref 32–36)
MCV RBC AUTO: 93.2 FL — SIGNIFICANT CHANGE UP (ref 80–100)
MCV RBC AUTO: 93.2 FL — SIGNIFICANT CHANGE UP (ref 80–100)
MONOCYTES # BLD AUTO: 1.26 K/UL — HIGH (ref 0–0.9)
MONOCYTES # BLD AUTO: 1.26 K/UL — HIGH (ref 0–0.9)
MONOCYTES NFR BLD AUTO: 17.4 % — HIGH (ref 2–14)
MONOCYTES NFR BLD AUTO: 17.4 % — HIGH (ref 2–14)
NEUTROPHILS # BLD AUTO: 4.37 K/UL — SIGNIFICANT CHANGE UP (ref 1.8–7.4)
NEUTROPHILS # BLD AUTO: 4.37 K/UL — SIGNIFICANT CHANGE UP (ref 1.8–7.4)
NEUTROPHILS NFR BLD AUTO: 60.4 % — SIGNIFICANT CHANGE UP (ref 43–77)
NEUTROPHILS NFR BLD AUTO: 60.4 % — SIGNIFICANT CHANGE UP (ref 43–77)
NRBC # BLD: 0 /100 WBCS — SIGNIFICANT CHANGE UP (ref 0–0)
NRBC # BLD: 0 /100 WBCS — SIGNIFICANT CHANGE UP (ref 0–0)
PLATELET # BLD AUTO: 167 K/UL — SIGNIFICANT CHANGE UP (ref 150–400)
PLATELET # BLD AUTO: 167 K/UL — SIGNIFICANT CHANGE UP (ref 150–400)
RBC # BLD: 4.1 M/UL — SIGNIFICANT CHANGE UP (ref 3.8–5.2)
RBC # BLD: 4.1 M/UL — SIGNIFICANT CHANGE UP (ref 3.8–5.2)
RBC # FLD: 13.3 % — SIGNIFICANT CHANGE UP (ref 10.3–14.5)
RBC # FLD: 13.3 % — SIGNIFICANT CHANGE UP (ref 10.3–14.5)
WBC # BLD: 7.23 K/UL — SIGNIFICANT CHANGE UP (ref 3.8–10.5)
WBC # BLD: 7.23 K/UL — SIGNIFICANT CHANGE UP (ref 3.8–10.5)
WBC # FLD AUTO: 7.23 K/UL — SIGNIFICANT CHANGE UP (ref 3.8–10.5)
WBC # FLD AUTO: 7.23 K/UL — SIGNIFICANT CHANGE UP (ref 3.8–10.5)

## 2023-11-06 PROCEDURE — 99213 OFFICE O/P EST LOW 20 MIN: CPT

## 2023-11-14 LAB
ALBUMIN SERPL ELPH-MCNC: 4.5 G/DL
ALP BLD-CCNC: 106 U/L
ALT SERPL-CCNC: 23 U/L
ANION GAP SERPL CALC-SCNC: 12 MMOL/L
AST SERPL-CCNC: 26 U/L
BILIRUB SERPL-MCNC: 0.4 MG/DL
BUN SERPL-MCNC: 12 MG/DL
CALCIUM SERPL-MCNC: 9.2 MG/DL
CHLORIDE SERPL-SCNC: 105 MMOL/L
CO2 SERPL-SCNC: 24 MMOL/L
CREAT SERPL-MCNC: 0.51 MG/DL
EGFR: 93 ML/MIN/1.73M2
FERRITIN SERPL-MCNC: 69 NG/ML
GLUCOSE SERPL-MCNC: 94 MG/DL
IRON SATN MFR SERPL: 6 %
IRON SERPL-MCNC: 24 UG/DL
POTASSIUM SERPL-SCNC: 4.2 MMOL/L
PROT SERPL-MCNC: 6.6 G/DL
SODIUM SERPL-SCNC: 140 MMOL/L
TIBC SERPL-MCNC: 436 UG/DL
UIBC SERPL-MCNC: 412 UG/DL

## 2023-11-27 ENCOUNTER — APPOINTMENT (OUTPATIENT)
Dept: INTERNAL MEDICINE | Facility: CLINIC | Age: 83
End: 2023-11-27

## 2023-12-06 ENCOUNTER — APPOINTMENT (OUTPATIENT)
Dept: ORTHOPEDIC SURGERY | Facility: CLINIC | Age: 83
End: 2023-12-06
Payer: MEDICARE

## 2023-12-06 DIAGNOSIS — R29.898 OTHER SYMPTOMS AND SIGNS INVOLVING THE MUSCULOSKELETAL SYSTEM: ICD-10-CM

## 2023-12-06 DIAGNOSIS — M76.822 POSTERIOR TIBIAL TENDINITIS, LEFT LEG: ICD-10-CM

## 2023-12-06 PROCEDURE — 99204 OFFICE O/P NEW MOD 45 MIN: CPT

## 2023-12-06 PROCEDURE — 73610 X-RAY EXAM OF ANKLE: CPT | Mod: LT

## 2023-12-08 ENCOUNTER — APPOINTMENT (OUTPATIENT)
Dept: MRI IMAGING | Facility: CLINIC | Age: 83
End: 2023-12-08

## 2023-12-15 ENCOUNTER — RESULT REVIEW (OUTPATIENT)
Age: 83
End: 2023-12-15

## 2023-12-15 ENCOUNTER — APPOINTMENT (OUTPATIENT)
Dept: HEMATOLOGY ONCOLOGY | Facility: CLINIC | Age: 83
End: 2023-12-15
Payer: MEDICARE

## 2023-12-15 ENCOUNTER — APPOINTMENT (OUTPATIENT)
Dept: INFUSION THERAPY | Facility: HOSPITAL | Age: 83
End: 2023-12-15

## 2023-12-15 DIAGNOSIS — E61.1 IRON DEFICIENCY: ICD-10-CM

## 2023-12-15 LAB
BASOPHILS # BLD AUTO: 0.06 K/UL — SIGNIFICANT CHANGE UP (ref 0–0.2)
BASOPHILS # BLD AUTO: 0.06 K/UL — SIGNIFICANT CHANGE UP (ref 0–0.2)
BASOPHILS NFR BLD AUTO: 0.8 % — SIGNIFICANT CHANGE UP (ref 0–2)
BASOPHILS NFR BLD AUTO: 0.8 % — SIGNIFICANT CHANGE UP (ref 0–2)
EOSINOPHIL # BLD AUTO: 0.07 K/UL — SIGNIFICANT CHANGE UP (ref 0–0.5)
EOSINOPHIL # BLD AUTO: 0.07 K/UL — SIGNIFICANT CHANGE UP (ref 0–0.5)
EOSINOPHIL NFR BLD AUTO: 0.9 % — SIGNIFICANT CHANGE UP (ref 0–6)
EOSINOPHIL NFR BLD AUTO: 0.9 % — SIGNIFICANT CHANGE UP (ref 0–6)
HCT VFR BLD CALC: 37.4 % — SIGNIFICANT CHANGE UP (ref 34.5–45)
HCT VFR BLD CALC: 37.4 % — SIGNIFICANT CHANGE UP (ref 34.5–45)
HGB BLD-MCNC: 12.8 G/DL — SIGNIFICANT CHANGE UP (ref 11.5–15.5)
HGB BLD-MCNC: 12.8 G/DL — SIGNIFICANT CHANGE UP (ref 11.5–15.5)
IMM GRANULOCYTES NFR BLD AUTO: 1.4 % — HIGH (ref 0–0.9)
IMM GRANULOCYTES NFR BLD AUTO: 1.4 % — HIGH (ref 0–0.9)
LYMPHOCYTES # BLD AUTO: 1.25 K/UL — SIGNIFICANT CHANGE UP (ref 1–3.3)
LYMPHOCYTES # BLD AUTO: 1.25 K/UL — SIGNIFICANT CHANGE UP (ref 1–3.3)
LYMPHOCYTES # BLD AUTO: 16.1 % — SIGNIFICANT CHANGE UP (ref 13–44)
LYMPHOCYTES # BLD AUTO: 16.1 % — SIGNIFICANT CHANGE UP (ref 13–44)
MCHC RBC-ENTMCNC: 31.1 PG — SIGNIFICANT CHANGE UP (ref 27–34)
MCHC RBC-ENTMCNC: 31.1 PG — SIGNIFICANT CHANGE UP (ref 27–34)
MCHC RBC-ENTMCNC: 34.2 G/DL — SIGNIFICANT CHANGE UP (ref 32–36)
MCHC RBC-ENTMCNC: 34.2 G/DL — SIGNIFICANT CHANGE UP (ref 32–36)
MCV RBC AUTO: 91 FL — SIGNIFICANT CHANGE UP (ref 80–100)
MCV RBC AUTO: 91 FL — SIGNIFICANT CHANGE UP (ref 80–100)
MONOCYTES # BLD AUTO: 0.65 K/UL — SIGNIFICANT CHANGE UP (ref 0–0.9)
MONOCYTES # BLD AUTO: 0.65 K/UL — SIGNIFICANT CHANGE UP (ref 0–0.9)
MONOCYTES NFR BLD AUTO: 8.4 % — SIGNIFICANT CHANGE UP (ref 2–14)
MONOCYTES NFR BLD AUTO: 8.4 % — SIGNIFICANT CHANGE UP (ref 2–14)
NEUTROPHILS # BLD AUTO: 5.61 K/UL — SIGNIFICANT CHANGE UP (ref 1.8–7.4)
NEUTROPHILS # BLD AUTO: 5.61 K/UL — SIGNIFICANT CHANGE UP (ref 1.8–7.4)
NEUTROPHILS NFR BLD AUTO: 72.4 % — SIGNIFICANT CHANGE UP (ref 43–77)
NEUTROPHILS NFR BLD AUTO: 72.4 % — SIGNIFICANT CHANGE UP (ref 43–77)
NRBC # BLD: 0 /100 WBCS — SIGNIFICANT CHANGE UP (ref 0–0)
NRBC # BLD: 0 /100 WBCS — SIGNIFICANT CHANGE UP (ref 0–0)
PLATELET # BLD AUTO: 183 K/UL — SIGNIFICANT CHANGE UP (ref 150–400)
PLATELET # BLD AUTO: 183 K/UL — SIGNIFICANT CHANGE UP (ref 150–400)
RBC # BLD: 4.11 M/UL — SIGNIFICANT CHANGE UP (ref 3.8–5.2)
RBC # BLD: 4.11 M/UL — SIGNIFICANT CHANGE UP (ref 3.8–5.2)
RBC # FLD: 13.3 % — SIGNIFICANT CHANGE UP (ref 10.3–14.5)
RBC # FLD: 13.3 % — SIGNIFICANT CHANGE UP (ref 10.3–14.5)
WBC # BLD: 7.75 K/UL — SIGNIFICANT CHANGE UP (ref 3.8–10.5)
WBC # BLD: 7.75 K/UL — SIGNIFICANT CHANGE UP (ref 3.8–10.5)
WBC # FLD AUTO: 7.75 K/UL — SIGNIFICANT CHANGE UP (ref 3.8–10.5)
WBC # FLD AUTO: 7.75 K/UL — SIGNIFICANT CHANGE UP (ref 3.8–10.5)

## 2023-12-15 PROCEDURE — 99214 OFFICE O/P EST MOD 30 MIN: CPT

## 2023-12-22 ENCOUNTER — APPOINTMENT (OUTPATIENT)
Dept: INFUSION THERAPY | Facility: HOSPITAL | Age: 83
End: 2023-12-22

## 2023-12-27 ENCOUNTER — APPOINTMENT (OUTPATIENT)
Dept: ORTHOPEDIC SURGERY | Facility: CLINIC | Age: 83
End: 2023-12-27

## 2023-12-27 PROBLEM — N95.2 POSTMENOPAUSAL ATROPHIC VAGINITIS: Chronic | Status: ACTIVE | Noted: 2023-12-11

## 2023-12-27 PROBLEM — I10 ESSENTIAL (PRIMARY) HYPERTENSION: Chronic | Status: ACTIVE | Noted: 2023-12-11

## 2023-12-27 PROBLEM — J20.9 ACUTE BRONCHITIS, UNSPECIFIED: Chronic | Status: ACTIVE | Noted: 2023-12-11

## 2023-12-27 PROBLEM — E03.9 HYPOTHYROIDISM, UNSPECIFIED: Chronic | Status: ACTIVE | Noted: 2023-12-11

## 2023-12-28 ENCOUNTER — OUTPATIENT (OUTPATIENT)
Dept: OUTPATIENT SERVICES | Facility: HOSPITAL | Age: 83
LOS: 1 days | Discharge: HOME | End: 2023-12-28

## 2023-12-28 DIAGNOSIS — D50.9 IRON DEFICIENCY ANEMIA, UNSPECIFIED: ICD-10-CM

## 2024-01-08 ENCOUNTER — APPOINTMENT (OUTPATIENT)
Dept: INFUSION THERAPY | Facility: HOSPITAL | Age: 84
End: 2024-01-08

## 2024-01-08 ENCOUNTER — INPATIENT (INPATIENT)
Facility: HOSPITAL | Age: 84
LOS: 5 days | Discharge: HOME HEALTH | End: 2024-01-14
Attending: INTERNAL MEDICINE | Admitting: INTERNAL MEDICINE
Payer: MEDICARE

## 2024-01-08 ENCOUNTER — APPOINTMENT (OUTPATIENT)
Dept: HEMATOLOGY ONCOLOGY | Facility: CLINIC | Age: 84
End: 2024-01-08
Payer: MEDICARE

## 2024-01-08 VITALS
RESPIRATION RATE: 16 BRPM | SYSTOLIC BLOOD PRESSURE: 175 MMHG | OXYGEN SATURATION: 88 % | HEART RATE: 140 BPM | DIASTOLIC BLOOD PRESSURE: 60 MMHG

## 2024-01-08 DIAGNOSIS — E03.9 HYPOTHYROIDISM, UNSPECIFIED: ICD-10-CM

## 2024-01-08 DIAGNOSIS — E78.5 HYPERLIPIDEMIA, UNSPECIFIED: ICD-10-CM

## 2024-01-08 DIAGNOSIS — I48.91 UNSPECIFIED ATRIAL FIBRILLATION: ICD-10-CM

## 2024-01-08 DIAGNOSIS — I10 ESSENTIAL (PRIMARY) HYPERTENSION: ICD-10-CM

## 2024-01-08 DIAGNOSIS — D50.9 IRON DEFICIENCY ANEMIA, UNSPECIFIED: ICD-10-CM

## 2024-01-08 DIAGNOSIS — R06.02 SHORTNESS OF BREATH: ICD-10-CM

## 2024-01-08 DIAGNOSIS — J96.01 ACUTE RESPIRATORY FAILURE WITH HYPOXIA: ICD-10-CM

## 2024-01-08 DIAGNOSIS — J45.901 UNSPECIFIED ASTHMA WITH (ACUTE) EXACERBATION: ICD-10-CM

## 2024-01-08 LAB
ALBUMIN SERPL ELPH-MCNC: 3.6 G/DL — SIGNIFICANT CHANGE UP (ref 3.3–5)
ALBUMIN SERPL ELPH-MCNC: 3.6 G/DL — SIGNIFICANT CHANGE UP (ref 3.3–5)
ALP SERPL-CCNC: 224 U/L — HIGH (ref 40–120)
ALP SERPL-CCNC: 224 U/L — HIGH (ref 40–120)
ALT FLD-CCNC: 52 U/L — HIGH (ref 4–33)
ALT FLD-CCNC: 52 U/L — HIGH (ref 4–33)
ANION GAP SERPL CALC-SCNC: 18 MMOL/L — HIGH (ref 7–14)
ANION GAP SERPL CALC-SCNC: 18 MMOL/L — HIGH (ref 7–14)
APTT BLD: 29 SEC — SIGNIFICANT CHANGE UP (ref 24.5–35.6)
APTT BLD: 29 SEC — SIGNIFICANT CHANGE UP (ref 24.5–35.6)
AST SERPL-CCNC: 109 U/L — HIGH (ref 4–32)
AST SERPL-CCNC: 109 U/L — HIGH (ref 4–32)
BASE EXCESS BLDV CALC-SCNC: -3.5 MMOL/L — LOW (ref -2–3)
BASE EXCESS BLDV CALC-SCNC: -3.5 MMOL/L — LOW (ref -2–3)
BASE EXCESS BLDV CALC-SCNC: -4.6 MMOL/L — LOW (ref -2–3)
BASE EXCESS BLDV CALC-SCNC: -4.6 MMOL/L — LOW (ref -2–3)
BASOPHILS # BLD AUTO: 0.08 K/UL — SIGNIFICANT CHANGE UP (ref 0–0.2)
BASOPHILS # BLD AUTO: 0.08 K/UL — SIGNIFICANT CHANGE UP (ref 0–0.2)
BASOPHILS NFR BLD AUTO: 0.5 % — SIGNIFICANT CHANGE UP (ref 0–2)
BASOPHILS NFR BLD AUTO: 0.5 % — SIGNIFICANT CHANGE UP (ref 0–2)
BILIRUB SERPL-MCNC: 0.8 MG/DL — SIGNIFICANT CHANGE UP (ref 0.2–1.2)
BILIRUB SERPL-MCNC: 0.8 MG/DL — SIGNIFICANT CHANGE UP (ref 0.2–1.2)
BLOOD GAS VENOUS COMPREHENSIVE RESULT: SIGNIFICANT CHANGE UP
BUN SERPL-MCNC: 12 MG/DL — SIGNIFICANT CHANGE UP (ref 7–23)
BUN SERPL-MCNC: 12 MG/DL — SIGNIFICANT CHANGE UP (ref 7–23)
CALCIUM SERPL-MCNC: 9 MG/DL — SIGNIFICANT CHANGE UP (ref 8.4–10.5)
CALCIUM SERPL-MCNC: 9 MG/DL — SIGNIFICANT CHANGE UP (ref 8.4–10.5)
CHLORIDE BLDV-SCNC: 100 MMOL/L — SIGNIFICANT CHANGE UP (ref 96–108)
CHLORIDE BLDV-SCNC: 100 MMOL/L — SIGNIFICANT CHANGE UP (ref 96–108)
CHLORIDE BLDV-SCNC: 99 MMOL/L — SIGNIFICANT CHANGE UP (ref 96–108)
CHLORIDE BLDV-SCNC: 99 MMOL/L — SIGNIFICANT CHANGE UP (ref 96–108)
CHLORIDE SERPL-SCNC: 98 MMOL/L — SIGNIFICANT CHANGE UP (ref 98–107)
CHLORIDE SERPL-SCNC: 98 MMOL/L — SIGNIFICANT CHANGE UP (ref 98–107)
CO2 BLDV-SCNC: 20.4 MMOL/L — LOW (ref 22–26)
CO2 BLDV-SCNC: 20.4 MMOL/L — LOW (ref 22–26)
CO2 BLDV-SCNC: 27.7 MMOL/L — HIGH (ref 22–26)
CO2 BLDV-SCNC: 27.7 MMOL/L — HIGH (ref 22–26)
CO2 SERPL-SCNC: 20 MMOL/L — LOW (ref 22–31)
CO2 SERPL-SCNC: 20 MMOL/L — LOW (ref 22–31)
CREAT SERPL-MCNC: 0.58 MG/DL — SIGNIFICANT CHANGE UP (ref 0.5–1.3)
CREAT SERPL-MCNC: 0.58 MG/DL — SIGNIFICANT CHANGE UP (ref 0.5–1.3)
EGFR: 90 ML/MIN/1.73M2 — SIGNIFICANT CHANGE UP
EGFR: 90 ML/MIN/1.73M2 — SIGNIFICANT CHANGE UP
EOSINOPHIL # BLD AUTO: 0.2 K/UL — SIGNIFICANT CHANGE UP (ref 0–0.5)
EOSINOPHIL # BLD AUTO: 0.2 K/UL — SIGNIFICANT CHANGE UP (ref 0–0.5)
EOSINOPHIL NFR BLD AUTO: 1.3 % — SIGNIFICANT CHANGE UP (ref 0–6)
EOSINOPHIL NFR BLD AUTO: 1.3 % — SIGNIFICANT CHANGE UP (ref 0–6)
FLUAV AG NPH QL: SIGNIFICANT CHANGE UP
FLUAV AG NPH QL: SIGNIFICANT CHANGE UP
FLUBV AG NPH QL: SIGNIFICANT CHANGE UP
FLUBV AG NPH QL: SIGNIFICANT CHANGE UP
GAS PNL BLDV: 129 MMOL/L — LOW (ref 136–145)
GAS PNL BLDV: 129 MMOL/L — LOW (ref 136–145)
GAS PNL BLDV: 130 MMOL/L — LOW (ref 136–145)
GAS PNL BLDV: 130 MMOL/L — LOW (ref 136–145)
GLUCOSE BLDV-MCNC: 145 MG/DL — HIGH (ref 70–99)
GLUCOSE BLDV-MCNC: 145 MG/DL — HIGH (ref 70–99)
GLUCOSE BLDV-MCNC: 208 MG/DL — HIGH (ref 70–99)
GLUCOSE BLDV-MCNC: 208 MG/DL — HIGH (ref 70–99)
GLUCOSE SERPL-MCNC: 140 MG/DL — HIGH (ref 70–99)
GLUCOSE SERPL-MCNC: 140 MG/DL — HIGH (ref 70–99)
HCO3 BLDV-SCNC: 19 MMOL/L — LOW (ref 22–29)
HCO3 BLDV-SCNC: 19 MMOL/L — LOW (ref 22–29)
HCO3 BLDV-SCNC: 26 MMOL/L — SIGNIFICANT CHANGE UP (ref 22–29)
HCO3 BLDV-SCNC: 26 MMOL/L — SIGNIFICANT CHANGE UP (ref 22–29)
HCT VFR BLD CALC: 48.3 % — HIGH (ref 34.5–45)
HCT VFR BLD CALC: 48.3 % — HIGH (ref 34.5–45)
HCT VFR BLDA CALC: 41 % — SIGNIFICANT CHANGE UP (ref 34.5–46.5)
HCT VFR BLDA CALC: 41 % — SIGNIFICANT CHANGE UP (ref 34.5–46.5)
HCT VFR BLDA CALC: 47 % — HIGH (ref 34.5–46.5)
HCT VFR BLDA CALC: 47 % — HIGH (ref 34.5–46.5)
HGB BLD CALC-MCNC: 13.7 G/DL — SIGNIFICANT CHANGE UP (ref 11.7–16.1)
HGB BLD CALC-MCNC: 13.7 G/DL — SIGNIFICANT CHANGE UP (ref 11.7–16.1)
HGB BLD CALC-MCNC: 15.7 G/DL — SIGNIFICANT CHANGE UP (ref 11.7–16.1)
HGB BLD CALC-MCNC: 15.7 G/DL — SIGNIFICANT CHANGE UP (ref 11.7–16.1)
HGB BLD-MCNC: 15.6 G/DL — HIGH (ref 11.5–15.5)
HGB BLD-MCNC: 15.6 G/DL — HIGH (ref 11.5–15.5)
IANC: 12.95 K/UL — HIGH (ref 1.8–7.4)
IANC: 12.95 K/UL — HIGH (ref 1.8–7.4)
IMM GRANULOCYTES NFR BLD AUTO: 1.4 % — HIGH (ref 0–0.9)
IMM GRANULOCYTES NFR BLD AUTO: 1.4 % — HIGH (ref 0–0.9)
INR BLD: 1.26 RATIO — HIGH (ref 0.85–1.18)
INR BLD: 1.26 RATIO — HIGH (ref 0.85–1.18)
LACTATE BLDV-MCNC: 1.7 MMOL/L — SIGNIFICANT CHANGE UP (ref 0.5–2)
LACTATE BLDV-MCNC: 1.7 MMOL/L — SIGNIFICANT CHANGE UP (ref 0.5–2)
LACTATE BLDV-MCNC: 3.5 MMOL/L — HIGH (ref 0.5–2)
LACTATE BLDV-MCNC: 3.5 MMOL/L — HIGH (ref 0.5–2)
LYMPHOCYTES # BLD AUTO: 1.86 K/UL — SIGNIFICANT CHANGE UP (ref 1–3.3)
LYMPHOCYTES # BLD AUTO: 1.86 K/UL — SIGNIFICANT CHANGE UP (ref 1–3.3)
LYMPHOCYTES # BLD AUTO: 11.9 % — LOW (ref 13–44)
LYMPHOCYTES # BLD AUTO: 11.9 % — LOW (ref 13–44)
MCHC RBC-ENTMCNC: 30.2 PG — SIGNIFICANT CHANGE UP (ref 27–34)
MCHC RBC-ENTMCNC: 30.2 PG — SIGNIFICANT CHANGE UP (ref 27–34)
MCHC RBC-ENTMCNC: 32.3 GM/DL — SIGNIFICANT CHANGE UP (ref 32–36)
MCHC RBC-ENTMCNC: 32.3 GM/DL — SIGNIFICANT CHANGE UP (ref 32–36)
MCV RBC AUTO: 93.4 FL — SIGNIFICANT CHANGE UP (ref 80–100)
MCV RBC AUTO: 93.4 FL — SIGNIFICANT CHANGE UP (ref 80–100)
MONOCYTES # BLD AUTO: 0.37 K/UL — SIGNIFICANT CHANGE UP (ref 0–0.9)
MONOCYTES # BLD AUTO: 0.37 K/UL — SIGNIFICANT CHANGE UP (ref 0–0.9)
MONOCYTES NFR BLD AUTO: 2.4 % — SIGNIFICANT CHANGE UP (ref 2–14)
MONOCYTES NFR BLD AUTO: 2.4 % — SIGNIFICANT CHANGE UP (ref 2–14)
NEUTROPHILS # BLD AUTO: 12.95 K/UL — HIGH (ref 1.8–7.4)
NEUTROPHILS # BLD AUTO: 12.95 K/UL — HIGH (ref 1.8–7.4)
NEUTROPHILS NFR BLD AUTO: 82.5 % — HIGH (ref 43–77)
NEUTROPHILS NFR BLD AUTO: 82.5 % — HIGH (ref 43–77)
NRBC # BLD: 0 /100 WBCS — SIGNIFICANT CHANGE UP (ref 0–0)
NRBC # BLD: 0 /100 WBCS — SIGNIFICANT CHANGE UP (ref 0–0)
NRBC # FLD: 0 K/UL — SIGNIFICANT CHANGE UP (ref 0–0)
NRBC # FLD: 0 K/UL — SIGNIFICANT CHANGE UP (ref 0–0)
PCO2 BLDV: 32 MMHG — LOW (ref 39–52)
PCO2 BLDV: 32 MMHG — LOW (ref 39–52)
PCO2 BLDV: 63 MMHG — HIGH (ref 39–52)
PCO2 BLDV: 63 MMHG — HIGH (ref 39–52)
PH BLDV: 7.22 — LOW (ref 7.32–7.43)
PH BLDV: 7.22 — LOW (ref 7.32–7.43)
PH BLDV: 7.39 — SIGNIFICANT CHANGE UP (ref 7.32–7.43)
PH BLDV: 7.39 — SIGNIFICANT CHANGE UP (ref 7.32–7.43)
PLATELET # BLD AUTO: 392 K/UL — SIGNIFICANT CHANGE UP (ref 150–400)
PLATELET # BLD AUTO: 392 K/UL — SIGNIFICANT CHANGE UP (ref 150–400)
PO2 BLDV: 62 MMHG — HIGH (ref 25–45)
PO2 BLDV: 62 MMHG — HIGH (ref 25–45)
PO2 BLDV: <20 MMHG — LOW (ref 25–45)
PO2 BLDV: <20 MMHG — LOW (ref 25–45)
POTASSIUM BLDV-SCNC: 3.3 MMOL/L — LOW (ref 3.5–5.1)
POTASSIUM BLDV-SCNC: 3.3 MMOL/L — LOW (ref 3.5–5.1)
POTASSIUM BLDV-SCNC: 3.8 MMOL/L — SIGNIFICANT CHANGE UP (ref 3.5–5.1)
POTASSIUM BLDV-SCNC: 3.8 MMOL/L — SIGNIFICANT CHANGE UP (ref 3.5–5.1)
POTASSIUM SERPL-MCNC: 3.8 MMOL/L — SIGNIFICANT CHANGE UP (ref 3.5–5.3)
POTASSIUM SERPL-MCNC: 3.8 MMOL/L — SIGNIFICANT CHANGE UP (ref 3.5–5.3)
POTASSIUM SERPL-SCNC: 3.8 MMOL/L — SIGNIFICANT CHANGE UP (ref 3.5–5.3)
POTASSIUM SERPL-SCNC: 3.8 MMOL/L — SIGNIFICANT CHANGE UP (ref 3.5–5.3)
PROT SERPL-MCNC: 7.2 G/DL — SIGNIFICANT CHANGE UP (ref 6–8.3)
PROT SERPL-MCNC: 7.2 G/DL — SIGNIFICANT CHANGE UP (ref 6–8.3)
PROTHROM AB SERPL-ACNC: 14 SEC — HIGH (ref 9.5–13)
PROTHROM AB SERPL-ACNC: 14 SEC — HIGH (ref 9.5–13)
RBC # BLD: 5.17 M/UL — SIGNIFICANT CHANGE UP (ref 3.8–5.2)
RBC # BLD: 5.17 M/UL — SIGNIFICANT CHANGE UP (ref 3.8–5.2)
RBC # FLD: 13.8 % — SIGNIFICANT CHANGE UP (ref 10.3–14.5)
RBC # FLD: 13.8 % — SIGNIFICANT CHANGE UP (ref 10.3–14.5)
RSV RNA NPH QL NAA+NON-PROBE: SIGNIFICANT CHANGE UP
RSV RNA NPH QL NAA+NON-PROBE: SIGNIFICANT CHANGE UP
SAO2 % BLDV: 16.3 % — LOW (ref 67–88)
SAO2 % BLDV: 16.3 % — LOW (ref 67–88)
SAO2 % BLDV: 92.2 % — HIGH (ref 67–88)
SAO2 % BLDV: 92.2 % — HIGH (ref 67–88)
SARS-COV-2 RNA SPEC QL NAA+PROBE: SIGNIFICANT CHANGE UP
SARS-COV-2 RNA SPEC QL NAA+PROBE: SIGNIFICANT CHANGE UP
SODIUM SERPL-SCNC: 136 MMOL/L — SIGNIFICANT CHANGE UP (ref 135–145)
SODIUM SERPL-SCNC: 136 MMOL/L — SIGNIFICANT CHANGE UP (ref 135–145)
TROPONIN T, HIGH SENSITIVITY RESULT: 19 NG/L — SIGNIFICANT CHANGE UP
TROPONIN T, HIGH SENSITIVITY RESULT: 19 NG/L — SIGNIFICANT CHANGE UP
TROPONIN T, HIGH SENSITIVITY RESULT: 24 NG/L — SIGNIFICANT CHANGE UP
TROPONIN T, HIGH SENSITIVITY RESULT: 24 NG/L — SIGNIFICANT CHANGE UP
WBC # BLD: 15.68 K/UL — HIGH (ref 3.8–10.5)
WBC # BLD: 15.68 K/UL — HIGH (ref 3.8–10.5)
WBC # FLD AUTO: 15.68 K/UL — HIGH (ref 3.8–10.5)
WBC # FLD AUTO: 15.68 K/UL — HIGH (ref 3.8–10.5)

## 2024-01-08 PROCEDURE — 99497 ADVNCD CARE PLAN 30 MIN: CPT | Mod: 25

## 2024-01-08 PROCEDURE — 99215 OFFICE O/P EST HI 40 MIN: CPT

## 2024-01-08 PROCEDURE — 99285 EMERGENCY DEPT VISIT HI MDM: CPT | Mod: GC

## 2024-01-08 PROCEDURE — 71045 X-RAY EXAM CHEST 1 VIEW: CPT | Mod: 26

## 2024-01-08 PROCEDURE — 99223 1ST HOSP IP/OBS HIGH 75: CPT

## 2024-01-08 RX ORDER — ATORVASTATIN CALCIUM 80 MG/1
40 TABLET, FILM COATED ORAL AT BEDTIME
Refills: 0 | Status: DISCONTINUED | OUTPATIENT
Start: 2024-01-08 | End: 2024-01-14

## 2024-01-08 RX ORDER — DILTIAZEM HCL 120 MG
20 CAPSULE, EXT RELEASE 24 HR ORAL ONCE
Refills: 0 | Status: COMPLETED | OUTPATIENT
Start: 2024-01-08 | End: 2024-01-08

## 2024-01-08 RX ORDER — DIPHENHYDRAMINE HCL 50 MG
50 CAPSULE ORAL ONCE
Refills: 0 | Status: COMPLETED | OUTPATIENT
Start: 2024-01-08 | End: 2024-01-08

## 2024-01-08 RX ORDER — ONDANSETRON 8 MG/1
4 TABLET, FILM COATED ORAL EVERY 8 HOURS
Refills: 0 | Status: DISCONTINUED | OUTPATIENT
Start: 2024-01-08 | End: 2024-01-14

## 2024-01-08 RX ORDER — HEPARIN SODIUM 5000 [USP'U]/ML
4500 INJECTION INTRAVENOUS; SUBCUTANEOUS EVERY 6 HOURS
Refills: 0 | Status: DISCONTINUED | OUTPATIENT
Start: 2024-01-08 | End: 2024-01-09

## 2024-01-08 RX ORDER — LISINOPRIL 2.5 MG/1
20 TABLET ORAL
Refills: 0 | Status: DISCONTINUED | OUTPATIENT
Start: 2024-01-09 | End: 2024-01-14

## 2024-01-08 RX ORDER — DILTIAZEM HCL 120 MG
60 CAPSULE, EXT RELEASE 24 HR ORAL ONCE
Refills: 0 | Status: COMPLETED | OUTPATIENT
Start: 2024-01-08 | End: 2024-01-08

## 2024-01-08 RX ORDER — FAMOTIDINE 10 MG/ML
20 INJECTION INTRAVENOUS ONCE
Refills: 0 | Status: COMPLETED | OUTPATIENT
Start: 2024-01-08 | End: 2024-01-08

## 2024-01-08 RX ORDER — RIVAROXABAN 15 MG-20MG
1 KIT ORAL
Refills: 0 | DISCHARGE

## 2024-01-08 RX ORDER — HEPARIN SODIUM 5000 [USP'U]/ML
INJECTION INTRAVENOUS; SUBCUTANEOUS
Qty: 25000 | Refills: 0 | Status: DISCONTINUED | OUTPATIENT
Start: 2024-01-08 | End: 2024-01-09

## 2024-01-08 RX ORDER — ACETAMINOPHEN 500 MG
650 TABLET ORAL EVERY 6 HOURS
Refills: 0 | Status: DISCONTINUED | OUTPATIENT
Start: 2024-01-08 | End: 2024-01-14

## 2024-01-08 RX ORDER — ROSUVASTATIN CALCIUM 5 MG/1
1 TABLET ORAL
Refills: 0 | DISCHARGE

## 2024-01-08 RX ORDER — EPINEPHRINE 0.3 MG/.3ML
0.3 INJECTION INTRAMUSCULAR; SUBCUTANEOUS ONCE
Refills: 0 | Status: COMPLETED | OUTPATIENT
Start: 2024-01-08 | End: 2024-01-08

## 2024-01-08 RX ORDER — IPRATROPIUM/ALBUTEROL SULFATE 18-103MCG
3 AEROSOL WITH ADAPTER (GRAM) INHALATION
Refills: 0 | Status: COMPLETED | OUTPATIENT
Start: 2024-01-08 | End: 2024-01-08

## 2024-01-08 RX ORDER — HEPARIN SODIUM 5000 [USP'U]/ML
4500 INJECTION INTRAVENOUS; SUBCUTANEOUS ONCE
Refills: 0 | Status: COMPLETED | OUTPATIENT
Start: 2024-01-08 | End: 2024-01-08

## 2024-01-08 RX ORDER — CEFTRIAXONE 500 MG/1
1000 INJECTION, POWDER, FOR SOLUTION INTRAMUSCULAR; INTRAVENOUS ONCE
Refills: 0 | Status: COMPLETED | OUTPATIENT
Start: 2024-01-08 | End: 2024-01-08

## 2024-01-08 RX ORDER — ASPIRIN/CALCIUM CARB/MAGNESIUM 324 MG
1 TABLET ORAL
Refills: 0 | DISCHARGE

## 2024-01-08 RX ORDER — LANOLIN ALCOHOL/MO/W.PET/CERES
3 CREAM (GRAM) TOPICAL AT BEDTIME
Refills: 0 | Status: DISCONTINUED | OUTPATIENT
Start: 2024-01-08 | End: 2024-01-14

## 2024-01-08 RX ORDER — HEPARIN SODIUM 5000 [USP'U]/ML
2000 INJECTION INTRAVENOUS; SUBCUTANEOUS EVERY 6 HOURS
Refills: 0 | Status: DISCONTINUED | OUTPATIENT
Start: 2024-01-08 | End: 2024-01-09

## 2024-01-08 RX ORDER — METOPROLOL TARTRATE 50 MG
50 TABLET ORAL
Refills: 0 | Status: DISCONTINUED | OUTPATIENT
Start: 2024-01-08 | End: 2024-01-08

## 2024-01-08 RX ORDER — METOPROLOL TARTRATE 50 MG
50 TABLET ORAL
Refills: 0 | Status: DISCONTINUED | OUTPATIENT
Start: 2024-01-09 | End: 2024-01-12

## 2024-01-08 RX ORDER — ESTRADIOL 4 UG/1
1 INSERT VAGINAL
Refills: 0 | Status: DISCONTINUED | OUTPATIENT
Start: 2024-01-08 | End: 2024-01-14

## 2024-01-08 RX ORDER — IPRATROPIUM/ALBUTEROL SULFATE 18-103MCG
3 AEROSOL WITH ADAPTER (GRAM) INHALATION EVERY 6 HOURS
Refills: 0 | Status: DISCONTINUED | OUTPATIENT
Start: 2024-01-08 | End: 2024-01-12

## 2024-01-08 RX ORDER — LEVOTHYROXINE SODIUM 125 MCG
75 TABLET ORAL DAILY
Refills: 0 | Status: DISCONTINUED | OUTPATIENT
Start: 2024-01-08 | End: 2024-01-14

## 2024-01-08 RX ORDER — SODIUM CHLORIDE 9 MG/ML
1000 INJECTION INTRAMUSCULAR; INTRAVENOUS; SUBCUTANEOUS ONCE
Refills: 0 | Status: COMPLETED | OUTPATIENT
Start: 2024-01-08 | End: 2024-01-08

## 2024-01-08 RX ORDER — AZITHROMYCIN 500 MG/1
500 TABLET, FILM COATED ORAL ONCE
Refills: 0 | Status: COMPLETED | OUTPATIENT
Start: 2024-01-08 | End: 2024-01-08

## 2024-01-08 RX ADMIN — SODIUM CHLORIDE 2000 MILLILITER(S): 9 INJECTION INTRAMUSCULAR; INTRAVENOUS; SUBCUTANEOUS at 17:43

## 2024-01-08 RX ADMIN — Medication 20 MILLIGRAM(S): at 18:48

## 2024-01-08 RX ADMIN — Medication 3 MILLILITER(S): at 18:11

## 2024-01-08 RX ADMIN — Medication 3 MILLILITER(S): at 17:50

## 2024-01-08 RX ADMIN — AZITHROMYCIN 255 MILLIGRAM(S): 500 TABLET, FILM COATED ORAL at 19:21

## 2024-01-08 RX ADMIN — Medication 50 MILLIGRAM(S): at 17:45

## 2024-01-08 RX ADMIN — EPINEPHRINE 0.3 MILLIGRAM(S): 0.3 INJECTION INTRAMUSCULAR; SUBCUTANEOUS at 17:44

## 2024-01-08 RX ADMIN — FAMOTIDINE 20 MILLIGRAM(S): 10 INJECTION INTRAVENOUS at 17:44

## 2024-01-08 RX ADMIN — Medication 60 MILLIGRAM(S): at 18:57

## 2024-01-08 RX ADMIN — CEFTRIAXONE 100 MILLIGRAM(S): 500 INJECTION, POWDER, FOR SOLUTION INTRAMUSCULAR; INTRAVENOUS at 21:49

## 2024-01-08 RX ADMIN — HEPARIN SODIUM 1100 UNIT(S)/HR: 5000 INJECTION INTRAVENOUS; SUBCUTANEOUS at 23:23

## 2024-01-08 RX ADMIN — HEPARIN SODIUM 4500 UNIT(S): 5000 INJECTION INTRAVENOUS; SUBCUTANEOUS at 23:24

## 2024-01-08 RX ADMIN — Medication 3 MILLILITER(S): at 18:06

## 2024-01-08 NOTE — H&P ADULT - HISTORY OF PRESENT ILLNESS
83yr old female with a pmh of HTN, Dyslipidemia, CAD s/p PCI, COPD, Hashimoto's Thyroiditis, Recurrent UTIs, Spinal Stenosis s/p Laminectomy, GEORGIE, TIAs, paroxysmal afib on xarelto who presents    presents to ED for eval of 'allergic reaction'. Patient recieving iron infusion which she's recieved before and suddenly felt SOB and inc WOB. Never had a severe anapylactic rxn - has no itching, rash, abd pain, nausea/diarrhea. Denies smoking or lung dx but COPD listed on chart. Recieved IM epi and solumedrol unknown dose at infusion center at 4:30pm before arrival. EMS reports seeing afib on monitor initially.  83yr old female with a pmh of HTN, Dyslipidemia, CAD s/p PCI, COPD, Hashimoto's Thyroiditis, Recurrent UTIs, Spinal Stenosis s/p Laminectomy, GEORGIE, TIAs, paroxysmal afib on xarelto who presents with concern for an allergic reaction while receiving an iron infusion today. This is her second iron infusion (no rxn with 1st). She had increased WOB and SOB. She received epi/solumedrol (16:30) and after was "shaking" for a few minutes. Found to be in afib with RVR.   Denies  headache, dizziness, chest pain, palpitations, abdominal pain, joint pain, diarrhea/constipation, urinary symptoms.   Vitals: T 100.3, HR 95 (150s), /69, RR 18b satting 97% on 3L venturi mask.

## 2024-01-08 NOTE — H&P ADULT - PROBLEM SELECTOR PLAN 5
Chronic stable  /69  s/p diltiazem 60mg PO x1,  diltiazem 20mg IV x1  Continue home metoprolol tartrate 50mg BID, lisinopril 20mg BID  Monitor

## 2024-01-08 NOTE — ED PROVIDER NOTE - OBJECTIVE STATEMENT
82 y/o F with PMH of HTN, Dyslipidemia, CAD s/p PCI, COPD, Hashimoto's Thyroiditis, Recurrent UTIs, Spinal Stenosis s/p Laminectomy, GEORGIE, TIAs presents to ED for eval of 'allergic reaction'. Patient recieving iron infusion which she's recieved before and suddenly felt SOB and inc WOB. Never had a severe anapylactic rxn - has no itching, rash, abd pain, nausea/diarrhea. Denies smoking or lung dx but COPD listed on chart. Recieved IM epi and solumedrol unknown dose at infusion center at 4:30pm before arrival. EMS reports seeing afib on monitor initially. 84 y/o F with PMH of HTN, Dyslipidemia, CAD s/p PCI, COPD, Hashimoto's Thyroiditis, Recurrent UTIs, Spinal Stenosis s/p Laminectomy, GEORGIE, TIAs presents to ED for eval of 'allergic reaction'. Patient recieving iron infusion which she's recieved before and suddenly felt SOB and inc WOB. Never had a severe anapylactic rxn - has no itching, rash, abd pain, nausea/diarrhea. Denies smoking or lung dx but COPD listed on chart. Recieved IM epi and solumedrol unknown dose at infusion center at 4:30pm before arrival. EMS reports seeing afib on monitor initially.     #: cell [phone] , home phone [phone] 84 y/o F with PMH of HTN, Dyslipidemia, CAD s/p PCI, COPD, Hashimoto's Thyroiditis, Recurrent UTIs, Spinal Stenosis s/p Laminectomy, GEORGIE, TIAs presents to ED for eval of 'allergic reaction'. Patient recieving iron infusion which she's recieved before and suddenly felt SOB and inc WOB. Never had a severe anapylactic rxn - has no itching, rash, abd pain, nausea/diarrhea. Denies smoking or lung dx but COPD listed on chart. Recieved IM epi and solumedrol unknown dose at infusion center at 4:30pm before arrival. EMS reports seeing afib on monitor initially.     #: cell [phone] , home phone [phone]

## 2024-01-08 NOTE — H&P ADULT - ASSESSMENT
Constricted 83 yr old female presenting with afib with rvr, acute hypoxic respiratory failure 2/2 copd/asthma

## 2024-01-08 NOTE — H&P ADULT - NSHPPHYSICALEXAM_GEN_ALL_CORE
PHYSICAL EXAM:  GENERAL: NAD, comfortable at bedside   HEAD:  Atraumatic, Normocephalic  EYES: EOMI, PERRL, conjunctiva and sclera clear  NECK: Supple, No JVD  CHEST/LUNG: Clear to auscultation bilaterally; No wheezes, rales or rhonchi  HEART:irregularly irregular; No murmurs, rubs, or gallops, (+)S1, S2  ABDOMEN: Soft, Nontender, Nondistended; Normal Bowel sounds   EXTREMITIES:  2+ Peripheral Pulses, No clubbing, cyanosis, or edema  PSYCH: normal mood and affect  NEUROLOGY: AAOx3,moving all extremities spontaneously   SKIN: No rashes or lesions, dressing and bruising over left chest wall from where she had loop recorder placed

## 2024-01-08 NOTE — H&P ADULT - NSICDXPASTMEDICALHX_GEN_ALL_CORE_FT
PAST MEDICAL HISTORY:  Acute bronchitis     Hashimoto's thyroiditis     Hypertension     Hypothyroidism     Interstitial cystitis     Cortes syndrome     Osteopenia     Paroxysmal atrial fibrillation     Spinal stenosis     Torn meniscus     Vaginal atrophy

## 2024-01-08 NOTE — ED ADULT NURSE REASSESSMENT NOTE - NS ED NURSE REASSESS COMMENT FT1
Received report on pt, resting comfortably in bed, respirations are even and unlabored, tolerating simple mask well, a fib on cardiac monitor. Pt noted to have low grade fever MD Nagy notified, pending interventioms. Pt awaiting US

## 2024-01-08 NOTE — ED ADULT TRIAGE NOTE - NS ED NURSE AMBULANCES
[facility]

## 2024-01-08 NOTE — ED ADULT NURSE NOTE - OBJECTIVE STATEMENT
JIA RN: pt. received direct to room 4 as a notification for anaphylaxis. Pt. endorses receiving an iron infusion at an outpatient infusion center PTA, which she has received before, when she suddenly became SOB with an increased WOB. pt. noted to be tachypneic at this time, increased work of breathing noted. pt. not speaking in full sentences, needing to gasp for air in bewteen words, inspiratory wheezing noted. As per EMS, pt. received IM Epi and IM Solumedrol at the infusion center, unsure of dose. pt. arrives with 22g IV in the R hand with a 250cc bolus of NS running. 18g IV placed in the L AC. labs drawn and sent. due medications given. pt. assisted to change, bruising noted on the L breast radiating to the mid-sternal area, arrives with a patch of an unknown medication in the mid-sternal area. comfort measures provided, safety precautions maintained.

## 2024-01-08 NOTE — ED ADULT NURSE NOTE - NSFALLUNIVINTERV_ED_ALL_ED
Bed/Stretcher in lowest position, wheels locked, appropriate side rails in place/Call bell, personal items and telephone in reach/Instruct patient to call for assistance before getting out of bed/chair/stretcher/Non-slip footwear applied when patient is off stretcher/[place] to call system/Physically safe environment - no spills, clutter or unnecessary equipment/Purposeful proactive rounding/Room/bathroom lighting operational, light cord in reach Bed/Stretcher in lowest position, wheels locked, appropriate side rails in place/Call bell, personal items and telephone in reach/Instruct patient to call for assistance before getting out of bed/chair/stretcher/Non-slip footwear applied when patient is off stretcher/[place] to call system/Physically safe environment - no spills, clutter or unnecessary equipment/Purposeful proactive rounding/Room/bathroom lighting operational, light cord in reach

## 2024-01-08 NOTE — H&P ADULT - PROBLEM SELECTOR PLAN 1
New  afib with RVR with HR in 150s s/p epi  s/p diltiazem 60mg PO x1,  diltiazem 20mg IV x1  Continue home metoprolol tartrate 50mg BID  Continue heparin drip   Cardiology consult in AM/loop recorder interrogation in AM     * of note pt was to possibly have an ablation outpatient pending loop recorder findings New  afib with RVR with HR in 150s s/p epi  [id number] 6  s/p diltiazem 60mg PO x1,  diltiazem 20mg IV x1  Continue home metoprolol tartrate 50mg BID  Continue heparin drip (holding Xarelto)  Cardiology consult in AM/loop recorder interrogation in AM     * of note pt was to possibly have an ablation outpatient pending loop recorder findings New  afib with RVR with HR in 150s s/p epi  [id number] 6  s/p diltiazem 60mg PO x1,  diltiazem 20mg IV x1  Continue home metoprolol tartrate 50mg BID  Continue heparin drip (holding Xarelto)  Cardiology consult in AM/loop recorder interrogation in AM     * of note pt was to possibly have an ablation outpatient pending loop recorder findings New  afib with RVR with HR in 150s s/p epi  [id number] 6  s/p diltiazem 60mg PO x1,  diltiazem 20mg IV x1  Continue home metoprolol tartrate 50mg BID  Continue heparin drip (holding Xarelto)  Cardiology consult in AM/loop recorder interrogation in AM   ECHO ordered    * of note pt was to possibly have an ablation outpatient pending loop recorder findings New  afib with RVR with HR in 150s s/p epi  [id number] 6  s/p diltiazem 60mg PO x1,  diltiazem 20mg IV x1  Continue home metoprolol tartrate 50mg BID  Continue heparin drip (holding Xarelto)  Cardiology consult in AM/loop recorder interrogation in AM   ECHO ordered    * of note pt was to possibly have an ablation outpatient pending loop recorder findings

## 2024-01-08 NOTE — ED PROVIDER NOTE - CLINICAL SUMMARY MEDICAL DECISION MAKING FREE TEXT BOX
Yakov PGY3: 84 y/o F with PMH of HTN, Dyslipidemia, CAD s/p PCI, COPD, Hashimoto's Thyroiditis, Recurrent UTIs, Spinal Stenosis s/p Laminectomy, GEORGIE, TIAs presents to ED for eval of 'allergic reaction' with inc SOB/WOB after iron infusion but no rash or gi sxs or swelling. Differential Diagnosis includes but not limited to afib vs copd exacerbation vs ards with fluid overload. active wheezing on exam, tachypnea with inc WOB requiring O2. Desat at low 80s at [facility] so brought on NRB. Plan for labs, cxr, more epi/benadryl/pepcid for possible reaction but continue to eval for other sources.

## 2024-01-08 NOTE — ED PROVIDER NOTE - PHYSICAL EXAMINATION
CONSTITUTIONAL: NAD  SKIN:  no rash  HEAD: NCAT  EYES: NL inspection  ENT: MMM  NECK: Supple  CARD: RRR  RESP: diffuse wheezing, tachypnea, inc WOB with use of accessory muscles but able to speak to us in short scentences   ABD: S/NT no R/G  EXT: no LE edema  NEURO: Grossly unremarkable  PSYCH: Cooperative, appropriate.

## 2024-01-08 NOTE — H&P ADULT - NSHPLABSRESULTS_GEN_ALL_CORE
15.6   15.68 )-----------( 392      ( 08 Jan 2024 17:40 )             48.3     136  |  98  |  12  ----------------------------<  140<H>     01-08  3.8   |  20<L>  |  0.58    Ca    9.0      08 Jan 2024 17:40    TPro  7.2  /  Alb  3.6  /  TBili  0.8  /  DBili  x   /  AST  109<H>  /  ALT  52<H>  /  AlkPhos  224<H>  01-08    PT/INR: 14.0/1.26 (01-08-24 @ 21:22)  PTT: 29.0 (01-08-24 @ 21:22)      21:22 - VBG - pH: 7.39  | pCO2: 32    | pO2: 62    | Lactate: 1.7    17:40 - VBG - pH: 7.22  | pCO2: 63    | pO2: <20   | Lactate: 3.5            hs Troponin, T - 24 ng/L (01-08-24 @ 21:22)  hs Troponin, T - 19 ng/L (01-08-24 @ 17:40)      Pro-BNP: 412 (01-08-24 @ 17:40)    EKG interpreted by myself: afib with RVR  CXR interpreted by radiology:  Bilateral perihilar interstitial opacities possibly secondary to mild pulmonary edema.  Small left pleural effusion.  Left chest wall loop recorder.

## 2024-01-08 NOTE — ED ADULT TRIAGE NOTE - CHIEF COMPLAINT QUOTE
Pt brought in by EMS as an allergic reaction notification after receiving iron infusion, + shortness of breath, presents on non-rebreather spo2 88%, audible wheezing, tachycardic, pt brought back directly to rm 4.

## 2024-01-08 NOTE — ED PROCEDURE NOTE - ATTENDING CONTRIBUTION TO CARE
I (Cale) agree with above, I performed a history and physical. Counseled polly medical staff, physician assistant, and/or medical student on medical decision making as documented. Medical decisions and treatment interventions were made in real time during the patient encounter. Additionally and/or with the following exceptions:

## 2024-01-08 NOTE — H&P ADULT - BIRTH SEX
Chief Complaint   Patient presents with   • Eye Problem     right eye goopy started last night, now both eye.  patient exposed to pink eye at        Mario is a 4 year old male who presents to [facility] with complains of eye symptoms.  patient gives a 2 day history of redness, drainage and crusting inthe left eye.  This morning his right also began getting red.  Denies photophobia or severe pain.   Denies visual disturbances. Contacts: No.  Known exposure to conjunctivitis - No. Associated symptoms: none.    ROS  Negative    OBJECTIVE  General appearance: alert & oriented, pleasant and comfortable  Eyes: conjunctivae/corneas - conjunctival injection BOTH EYES, no corneal epithelial defect, no ant chamber abnormalities.   Ears: negative, External ears normal.  Canals clear.  TM's normal.  Nose: negative, Nares normal. Septum midline. Mucosa normal. No drainage or sinus tenderness.  Throat: pink and moist without edema, erythema or exudates  Neck: no lymphadenopathy or thyromegaly    ASSESSMENT  Conjunctivitis     PLAN  medications as ordered  Advised of highly contagious nature of this condition.  Recommend hot compresses as much as possible.  @[email or web address]@  Return to [facility] as needed for persistence, worsening, or appearance of new symptoms.  
Female

## 2024-01-08 NOTE — H&P ADULT - PROBLEM SELECTOR PLAN 2
New  T 100.3, HR 95 (150s), /69, RR 18b satting 97% on 3L venturi mask.  CXR: Bilateral perihilar interstitial opacities possibly secondary to mild pulmonary edema. Small left pleural effusion.  s/p duoneb, epi/benadryl/famotidine   ED reported wheezing on exam, pt with hx of bronchitis, no smoking hx but lived with father who smoked when younger  Continue duonebs and prednisone 40mg daily   Wean O2 as tolerated New  T 100.3, HR 95 (150s), /69, RR 18b satting 97% on 3L venturi mask.  CXR: Bilateral perihilar interstitial opacities possibly secondary to mild pulmonary edema. Small left pleural effusion.  s/p duoneb, epi/benadryl/famotidine, rocephin/azithromycin   ED reported wheezing on exam, pt with hx of bronchitis, no smoking hx but lived with father who smoked when younger  Continue duonebs and prednisone 40mg daily   Wean O2 as tolerated  if signs/symptoms of fluid overload develop can consider small dose of lasix

## 2024-01-08 NOTE — ED PROVIDER NOTE - ATTENDING CONTRIBUTION TO CARE
I (Cale) agree with above, I performed a history and physical. Counseled polly medical staff, physician assistant, and/or medical student on medical decision making as documented. Medical decisions and treatment interventions were made in real time during the patient encounter. Additionally and/or with the following exceptions: 83-year-old female past medical history of COPD, paroxysmal A-fib, Hashimoto thyroiditis, recurrent UTIs, TIA presents emergency department with wheezing and increased work of breathing.  Patient had supraclavicular retractions speaking in one-word sentences, diffuse wheezing.  She is status post epinephrine and Solu-Medrol at her iron infusion clinic and was transferred over for possible anaphylaxis.  She had continued wheezing so repeat dose of epinephrine was given, although no other clear organ system involvement.  However she was still symptomatic.  Also noted to be in A-fib with RVR.  After DuoNebs finished, she still had tachycardia so she was given diltiazem with good effect.  Hemoglobin 15.6, also has leukocytosis to 15.7, will give anticoagulation for stroke prophylaxis.  At time of writing pending APTT result.  Patient was also covered with antibiotics for COPD exacerbation.  CMP nonactionable.  Blood gas with lactate of 3.5 will repeat

## 2024-01-08 NOTE — ED PROVIDER NOTE - NSICDXPASTMEDICALHX_GEN_ALL_CORE_FT
PAST MEDICAL HISTORY:  Acute bronchitis     Acute UTI     Hashimoto's thyroiditis     Hypertension     Hypertension     Hypothyroidism     Interstitial cystitis     Cortes syndrome     Osteopenia     Spinal stenosis     Torn meniscus     Vaginal atrophy

## 2024-01-08 NOTE — H&P ADULT - PROBLEM SELECTOR PLAN 4
Chronic stable  receiving iron infusions- today was the second infusion  h/h stable- continue to monitor

## 2024-01-09 ENCOUNTER — TRANSCRIPTION ENCOUNTER (OUTPATIENT)
Age: 84
End: 2024-01-09

## 2024-01-09 DIAGNOSIS — R11.2 NAUSEA WITH VOMITING, UNSPECIFIED: ICD-10-CM

## 2024-01-09 LAB
A1C WITH ESTIMATED AVERAGE GLUCOSE RESULT: 5.6 % — SIGNIFICANT CHANGE UP (ref 4–5.6)
A1C WITH ESTIMATED AVERAGE GLUCOSE RESULT: 5.6 % — SIGNIFICANT CHANGE UP (ref 4–5.6)
ANION GAP SERPL CALC-SCNC: 17 MMOL/L — HIGH (ref 7–14)
ANION GAP SERPL CALC-SCNC: 17 MMOL/L — HIGH (ref 7–14)
APPEARANCE UR: CLEAR — SIGNIFICANT CHANGE UP
APPEARANCE UR: CLEAR — SIGNIFICANT CHANGE UP
APTT BLD: 48.6 SEC — HIGH (ref 24.5–35.6)
APTT BLD: 48.6 SEC — HIGH (ref 24.5–35.6)
B PERT DNA SPEC QL NAA+PROBE: SIGNIFICANT CHANGE UP
B PERT DNA SPEC QL NAA+PROBE: SIGNIFICANT CHANGE UP
B PERT+PARAPERT DNA PNL SPEC NAA+PROBE: SIGNIFICANT CHANGE UP
B PERT+PARAPERT DNA PNL SPEC NAA+PROBE: SIGNIFICANT CHANGE UP
BACTERIA # UR AUTO: ABNORMAL /HPF
BACTERIA # UR AUTO: ABNORMAL /HPF
BASOPHILS # BLD AUTO: 0.02 K/UL — SIGNIFICANT CHANGE UP (ref 0–0.2)
BASOPHILS # BLD AUTO: 0.02 K/UL — SIGNIFICANT CHANGE UP (ref 0–0.2)
BASOPHILS NFR BLD AUTO: 0.2 % — SIGNIFICANT CHANGE UP (ref 0–2)
BASOPHILS NFR BLD AUTO: 0.2 % — SIGNIFICANT CHANGE UP (ref 0–2)
BILIRUB UR-MCNC: NEGATIVE — SIGNIFICANT CHANGE UP
BILIRUB UR-MCNC: NEGATIVE — SIGNIFICANT CHANGE UP
BORDETELLA PARAPERTUSSIS (RAPRVP): SIGNIFICANT CHANGE UP
BORDETELLA PARAPERTUSSIS (RAPRVP): SIGNIFICANT CHANGE UP
BUN SERPL-MCNC: 10 MG/DL — SIGNIFICANT CHANGE UP (ref 7–23)
BUN SERPL-MCNC: 10 MG/DL — SIGNIFICANT CHANGE UP (ref 7–23)
C PNEUM DNA SPEC QL NAA+PROBE: SIGNIFICANT CHANGE UP
C PNEUM DNA SPEC QL NAA+PROBE: SIGNIFICANT CHANGE UP
CALCIUM SERPL-MCNC: 9 MG/DL — SIGNIFICANT CHANGE UP (ref 8.4–10.5)
CALCIUM SERPL-MCNC: 9 MG/DL — SIGNIFICANT CHANGE UP (ref 8.4–10.5)
CAST: 2 /LPF — SIGNIFICANT CHANGE UP (ref 0–4)
CAST: 2 /LPF — SIGNIFICANT CHANGE UP (ref 0–4)
CHLORIDE SERPL-SCNC: 100 MMOL/L — SIGNIFICANT CHANGE UP (ref 98–107)
CHLORIDE SERPL-SCNC: 100 MMOL/L — SIGNIFICANT CHANGE UP (ref 98–107)
CHOLEST SERPL-MCNC: 141 MG/DL — SIGNIFICANT CHANGE UP
CHOLEST SERPL-MCNC: 141 MG/DL — SIGNIFICANT CHANGE UP
CO2 SERPL-SCNC: 20 MMOL/L — LOW (ref 22–31)
CO2 SERPL-SCNC: 20 MMOL/L — LOW (ref 22–31)
COLOR SPEC: SIGNIFICANT CHANGE UP
COLOR SPEC: SIGNIFICANT CHANGE UP
CREAT SERPL-MCNC: 0.44 MG/DL — LOW (ref 0.5–1.3)
CREAT SERPL-MCNC: 0.44 MG/DL — LOW (ref 0.5–1.3)
DIFF PNL FLD: NEGATIVE — SIGNIFICANT CHANGE UP
DIFF PNL FLD: NEGATIVE — SIGNIFICANT CHANGE UP
EGFR: 96 ML/MIN/1.73M2 — SIGNIFICANT CHANGE UP
EGFR: 96 ML/MIN/1.73M2 — SIGNIFICANT CHANGE UP
EOSINOPHIL # BLD AUTO: 0 K/UL — SIGNIFICANT CHANGE UP (ref 0–0.5)
EOSINOPHIL # BLD AUTO: 0 K/UL — SIGNIFICANT CHANGE UP (ref 0–0.5)
EOSINOPHIL NFR BLD AUTO: 0 % — SIGNIFICANT CHANGE UP (ref 0–6)
EOSINOPHIL NFR BLD AUTO: 0 % — SIGNIFICANT CHANGE UP (ref 0–6)
ESTIMATED AVERAGE GLUCOSE: 114 — SIGNIFICANT CHANGE UP
ESTIMATED AVERAGE GLUCOSE: 114 — SIGNIFICANT CHANGE UP
FLUAV SUBTYP SPEC NAA+PROBE: SIGNIFICANT CHANGE UP
FLUAV SUBTYP SPEC NAA+PROBE: SIGNIFICANT CHANGE UP
FLUBV RNA SPEC QL NAA+PROBE: SIGNIFICANT CHANGE UP
FLUBV RNA SPEC QL NAA+PROBE: SIGNIFICANT CHANGE UP
GLUCOSE SERPL-MCNC: 175 MG/DL — HIGH (ref 70–99)
GLUCOSE SERPL-MCNC: 175 MG/DL — HIGH (ref 70–99)
GLUCOSE UR QL: NEGATIVE MG/DL — SIGNIFICANT CHANGE UP
GLUCOSE UR QL: NEGATIVE MG/DL — SIGNIFICANT CHANGE UP
HADV DNA SPEC QL NAA+PROBE: SIGNIFICANT CHANGE UP
HADV DNA SPEC QL NAA+PROBE: SIGNIFICANT CHANGE UP
HCOV 229E RNA SPEC QL NAA+PROBE: SIGNIFICANT CHANGE UP
HCOV 229E RNA SPEC QL NAA+PROBE: SIGNIFICANT CHANGE UP
HCOV HKU1 RNA SPEC QL NAA+PROBE: SIGNIFICANT CHANGE UP
HCOV HKU1 RNA SPEC QL NAA+PROBE: SIGNIFICANT CHANGE UP
HCOV NL63 RNA SPEC QL NAA+PROBE: SIGNIFICANT CHANGE UP
HCOV NL63 RNA SPEC QL NAA+PROBE: SIGNIFICANT CHANGE UP
HCOV OC43 RNA SPEC QL NAA+PROBE: SIGNIFICANT CHANGE UP
HCOV OC43 RNA SPEC QL NAA+PROBE: SIGNIFICANT CHANGE UP
HCT VFR BLD CALC: 40.6 % — SIGNIFICANT CHANGE UP (ref 34.5–45)
HCT VFR BLD CALC: 40.6 % — SIGNIFICANT CHANGE UP (ref 34.5–45)
HCT VFR BLD CALC: 40.8 % — SIGNIFICANT CHANGE UP (ref 34.5–45)
HCT VFR BLD CALC: 40.8 % — SIGNIFICANT CHANGE UP (ref 34.5–45)
HDLC SERPL-MCNC: 83 MG/DL — SIGNIFICANT CHANGE UP
HDLC SERPL-MCNC: 83 MG/DL — SIGNIFICANT CHANGE UP
HGB BLD-MCNC: 13.3 G/DL — SIGNIFICANT CHANGE UP (ref 11.5–15.5)
HMPV RNA SPEC QL NAA+PROBE: SIGNIFICANT CHANGE UP
HMPV RNA SPEC QL NAA+PROBE: SIGNIFICANT CHANGE UP
HPIV1 RNA SPEC QL NAA+PROBE: SIGNIFICANT CHANGE UP
HPIV1 RNA SPEC QL NAA+PROBE: SIGNIFICANT CHANGE UP
HPIV2 RNA SPEC QL NAA+PROBE: SIGNIFICANT CHANGE UP
HPIV2 RNA SPEC QL NAA+PROBE: SIGNIFICANT CHANGE UP
HPIV3 RNA SPEC QL NAA+PROBE: SIGNIFICANT CHANGE UP
HPIV3 RNA SPEC QL NAA+PROBE: SIGNIFICANT CHANGE UP
HPIV4 RNA SPEC QL NAA+PROBE: SIGNIFICANT CHANGE UP
HPIV4 RNA SPEC QL NAA+PROBE: SIGNIFICANT CHANGE UP
IANC: 11.71 K/UL — HIGH (ref 1.8–7.4)
IANC: 11.71 K/UL — HIGH (ref 1.8–7.4)
IMM GRANULOCYTES NFR BLD AUTO: 0.9 % — SIGNIFICANT CHANGE UP (ref 0–0.9)
IMM GRANULOCYTES NFR BLD AUTO: 0.9 % — SIGNIFICANT CHANGE UP (ref 0–0.9)
KETONES UR-MCNC: ABNORMAL MG/DL
KETONES UR-MCNC: ABNORMAL MG/DL
LACTATE SERPL-SCNC: 5.3 MMOL/L — CRITICAL HIGH (ref 0.5–2)
LACTATE SERPL-SCNC: 5.3 MMOL/L — CRITICAL HIGH (ref 0.5–2)
LEUKOCYTE ESTERASE UR-ACNC: ABNORMAL
LEUKOCYTE ESTERASE UR-ACNC: ABNORMAL
LIPID PNL WITH DIRECT LDL SERPL: 45 MG/DL — SIGNIFICANT CHANGE UP
LIPID PNL WITH DIRECT LDL SERPL: 45 MG/DL — SIGNIFICANT CHANGE UP
LYMPHOCYTES # BLD AUTO: 0.52 K/UL — LOW (ref 1–3.3)
LYMPHOCYTES # BLD AUTO: 0.52 K/UL — LOW (ref 1–3.3)
LYMPHOCYTES # BLD AUTO: 4.1 % — LOW (ref 13–44)
LYMPHOCYTES # BLD AUTO: 4.1 % — LOW (ref 13–44)
M PNEUMO DNA SPEC QL NAA+PROBE: SIGNIFICANT CHANGE UP
M PNEUMO DNA SPEC QL NAA+PROBE: SIGNIFICANT CHANGE UP
MCHC RBC-ENTMCNC: 30 PG — SIGNIFICANT CHANGE UP (ref 27–34)
MCHC RBC-ENTMCNC: 30 PG — SIGNIFICANT CHANGE UP (ref 27–34)
MCHC RBC-ENTMCNC: 30.1 PG — SIGNIFICANT CHANGE UP (ref 27–34)
MCHC RBC-ENTMCNC: 30.1 PG — SIGNIFICANT CHANGE UP (ref 27–34)
MCHC RBC-ENTMCNC: 32.6 GM/DL — SIGNIFICANT CHANGE UP (ref 32–36)
MCHC RBC-ENTMCNC: 32.6 GM/DL — SIGNIFICANT CHANGE UP (ref 32–36)
MCHC RBC-ENTMCNC: 32.8 GM/DL — SIGNIFICANT CHANGE UP (ref 32–36)
MCHC RBC-ENTMCNC: 32.8 GM/DL — SIGNIFICANT CHANGE UP (ref 32–36)
MCV RBC AUTO: 91.9 FL — SIGNIFICANT CHANGE UP (ref 80–100)
MCV RBC AUTO: 91.9 FL — SIGNIFICANT CHANGE UP (ref 80–100)
MCV RBC AUTO: 92.1 FL — SIGNIFICANT CHANGE UP (ref 80–100)
MCV RBC AUTO: 92.1 FL — SIGNIFICANT CHANGE UP (ref 80–100)
MONOCYTES # BLD AUTO: 0.46 K/UL — SIGNIFICANT CHANGE UP (ref 0–0.9)
MONOCYTES # BLD AUTO: 0.46 K/UL — SIGNIFICANT CHANGE UP (ref 0–0.9)
MONOCYTES NFR BLD AUTO: 3.6 % — SIGNIFICANT CHANGE UP (ref 2–14)
MONOCYTES NFR BLD AUTO: 3.6 % — SIGNIFICANT CHANGE UP (ref 2–14)
NEUTROPHILS # BLD AUTO: 11.71 K/UL — HIGH (ref 1.8–7.4)
NEUTROPHILS # BLD AUTO: 11.71 K/UL — HIGH (ref 1.8–7.4)
NEUTROPHILS NFR BLD AUTO: 91.2 % — HIGH (ref 43–77)
NEUTROPHILS NFR BLD AUTO: 91.2 % — HIGH (ref 43–77)
NITRITE UR-MCNC: NEGATIVE — SIGNIFICANT CHANGE UP
NITRITE UR-MCNC: NEGATIVE — SIGNIFICANT CHANGE UP
NON HDL CHOLESTEROL: 58 MG/DL — SIGNIFICANT CHANGE UP
NON HDL CHOLESTEROL: 58 MG/DL — SIGNIFICANT CHANGE UP
NRBC # BLD: 0 /100 WBCS — SIGNIFICANT CHANGE UP (ref 0–0)
NRBC # FLD: 0 K/UL — SIGNIFICANT CHANGE UP (ref 0–0)
PH UR: 6 — SIGNIFICANT CHANGE UP (ref 5–8)
PH UR: 6 — SIGNIFICANT CHANGE UP (ref 5–8)
PLATELET # BLD AUTO: 283 K/UL — SIGNIFICANT CHANGE UP (ref 150–400)
PLATELET # BLD AUTO: 283 K/UL — SIGNIFICANT CHANGE UP (ref 150–400)
PLATELET # BLD AUTO: 287 K/UL — SIGNIFICANT CHANGE UP (ref 150–400)
PLATELET # BLD AUTO: 287 K/UL — SIGNIFICANT CHANGE UP (ref 150–400)
POTASSIUM SERPL-MCNC: 3.8 MMOL/L — SIGNIFICANT CHANGE UP (ref 3.5–5.3)
POTASSIUM SERPL-MCNC: 3.8 MMOL/L — SIGNIFICANT CHANGE UP (ref 3.5–5.3)
POTASSIUM SERPL-SCNC: 3.8 MMOL/L — SIGNIFICANT CHANGE UP (ref 3.5–5.3)
POTASSIUM SERPL-SCNC: 3.8 MMOL/L — SIGNIFICANT CHANGE UP (ref 3.5–5.3)
PROT UR-MCNC: 100 MG/DL
PROT UR-MCNC: 100 MG/DL
RAPID RVP RESULT: SIGNIFICANT CHANGE UP
RAPID RVP RESULT: SIGNIFICANT CHANGE UP
RBC # BLD: 4.42 M/UL — SIGNIFICANT CHANGE UP (ref 3.8–5.2)
RBC # BLD: 4.42 M/UL — SIGNIFICANT CHANGE UP (ref 3.8–5.2)
RBC # BLD: 4.43 M/UL — SIGNIFICANT CHANGE UP (ref 3.8–5.2)
RBC # BLD: 4.43 M/UL — SIGNIFICANT CHANGE UP (ref 3.8–5.2)
RBC # FLD: 13.6 % — SIGNIFICANT CHANGE UP (ref 10.3–14.5)
RBC CASTS # UR COMP ASSIST: 2 /HPF — SIGNIFICANT CHANGE UP (ref 0–4)
RBC CASTS # UR COMP ASSIST: 2 /HPF — SIGNIFICANT CHANGE UP (ref 0–4)
RSV RNA SPEC QL NAA+PROBE: SIGNIFICANT CHANGE UP
RSV RNA SPEC QL NAA+PROBE: SIGNIFICANT CHANGE UP
RV+EV RNA SPEC QL NAA+PROBE: SIGNIFICANT CHANGE UP
RV+EV RNA SPEC QL NAA+PROBE: SIGNIFICANT CHANGE UP
SARS-COV-2 RNA SPEC QL NAA+PROBE: SIGNIFICANT CHANGE UP
SARS-COV-2 RNA SPEC QL NAA+PROBE: SIGNIFICANT CHANGE UP
SODIUM SERPL-SCNC: 137 MMOL/L — SIGNIFICANT CHANGE UP (ref 135–145)
SODIUM SERPL-SCNC: 137 MMOL/L — SIGNIFICANT CHANGE UP (ref 135–145)
SP GR SPEC: 1.02 — SIGNIFICANT CHANGE UP (ref 1–1.03)
SP GR SPEC: 1.02 — SIGNIFICANT CHANGE UP (ref 1–1.03)
SQUAMOUS # UR AUTO: 5 /HPF — SIGNIFICANT CHANGE UP (ref 0–5)
SQUAMOUS # UR AUTO: 5 /HPF — SIGNIFICANT CHANGE UP (ref 0–5)
TRIGL SERPL-MCNC: 64 MG/DL — SIGNIFICANT CHANGE UP
TRIGL SERPL-MCNC: 64 MG/DL — SIGNIFICANT CHANGE UP
UROBILINOGEN FLD QL: 1 MG/DL — SIGNIFICANT CHANGE UP (ref 0.2–1)
UROBILINOGEN FLD QL: 1 MG/DL — SIGNIFICANT CHANGE UP (ref 0.2–1)
WBC # BLD: 12.68 K/UL — HIGH (ref 3.8–10.5)
WBC # BLD: 12.68 K/UL — HIGH (ref 3.8–10.5)
WBC # BLD: 12.82 K/UL — HIGH (ref 3.8–10.5)
WBC # BLD: 12.82 K/UL — HIGH (ref 3.8–10.5)
WBC # FLD AUTO: 12.68 K/UL — HIGH (ref 3.8–10.5)
WBC # FLD AUTO: 12.68 K/UL — HIGH (ref 3.8–10.5)
WBC # FLD AUTO: 12.82 K/UL — HIGH (ref 3.8–10.5)
WBC # FLD AUTO: 12.82 K/UL — HIGH (ref 3.8–10.5)
WBC UR QL: 2 /HPF — SIGNIFICANT CHANGE UP (ref 0–5)
WBC UR QL: 2 /HPF — SIGNIFICANT CHANGE UP (ref 0–5)

## 2024-01-09 PROCEDURE — 99239 HOSP IP/OBS DSCHRG MGMT >30: CPT

## 2024-01-09 RX ORDER — LEVOTHYROXINE SODIUM 125 MCG
1 TABLET ORAL
Qty: 0 | Refills: 0 | DISCHARGE
Start: 2024-01-09

## 2024-01-09 RX ORDER — METOPROLOL TARTRATE 50 MG
1 TABLET ORAL
Qty: 0 | Refills: 0 | DISCHARGE
Start: 2024-01-09

## 2024-01-09 RX ORDER — RIVAROXABAN 15 MG-20MG
15 KIT ORAL
Refills: 0 | Status: DISCONTINUED | OUTPATIENT
Start: 2024-01-09 | End: 2024-01-14

## 2024-01-09 RX ORDER — IPRATROPIUM/ALBUTEROL SULFATE 18-103MCG
3 AEROSOL WITH ADAPTER (GRAM) INHALATION
Qty: 0 | Refills: 0 | DISCHARGE
Start: 2024-01-09

## 2024-01-09 RX ORDER — METOPROLOL TARTRATE 50 MG
1 TABLET ORAL
Refills: 0 | DISCHARGE

## 2024-01-09 RX ORDER — SODIUM CHLORIDE 9 MG/ML
500 INJECTION INTRAMUSCULAR; INTRAVENOUS; SUBCUTANEOUS
Refills: 0 | Status: DISCONTINUED | OUTPATIENT
Start: 2024-01-09 | End: 2024-01-12

## 2024-01-09 RX ORDER — LANOLIN ALCOHOL/MO/W.PET/CERES
1 CREAM (GRAM) TOPICAL
Qty: 0 | Refills: 0 | DISCHARGE
Start: 2024-01-09

## 2024-01-09 RX ADMIN — Medication 650 MILLIGRAM(S): at 23:48

## 2024-01-09 RX ADMIN — Medication 100 MILLIGRAM(S): at 03:04

## 2024-01-09 RX ADMIN — RIVAROXABAN 15 MILLIGRAM(S): KIT at 14:23

## 2024-01-09 RX ADMIN — LISINOPRIL 20 MILLIGRAM(S): 2.5 TABLET ORAL at 05:57

## 2024-01-09 RX ADMIN — LISINOPRIL 20 MILLIGRAM(S): 2.5 TABLET ORAL at 18:12

## 2024-01-09 RX ADMIN — Medication 50 MILLIGRAM(S): at 18:12

## 2024-01-09 RX ADMIN — Medication 50 MILLIGRAM(S): at 05:57

## 2024-01-09 RX ADMIN — Medication 75 MICROGRAM(S): at 05:57

## 2024-01-09 RX ADMIN — HEPARIN SODIUM 1200 UNIT(S)/HR: 5000 INJECTION INTRAVENOUS; SUBCUTANEOUS at 08:44

## 2024-01-09 RX ADMIN — Medication 40 MILLIGRAM(S): at 05:57

## 2024-01-09 RX ADMIN — Medication 650 MILLIGRAM(S): at 00:56

## 2024-01-09 RX ADMIN — HEPARIN SODIUM 2000 UNIT(S): 5000 INJECTION INTRAVENOUS; SUBCUTANEOUS at 07:40

## 2024-01-09 RX ADMIN — HEPARIN SODIUM 1200 UNIT(S)/HR: 5000 INJECTION INTRAVENOUS; SUBCUTANEOUS at 07:39

## 2024-01-09 RX ADMIN — ESTRADIOL 1 GRAM(S): 4 INSERT VAGINAL at 09:26

## 2024-01-09 RX ADMIN — ESTRADIOL 1 GRAM(S): 4 INSERT VAGINAL at 20:05

## 2024-01-09 NOTE — DISCHARGE NOTE NURSING/CASE MANAGEMENT/SOCIAL WORK - PATIENT PORTAL LINK FT
You can access the FollowMyHealth Patient Portal offered by [facility] by registering at the following website: [email or web address]. By joining [email or web address]’s FollowMyHealth portal, you will also be able to view your health information using other applications (apps) compatible with our system. You can access the FollowMyHealth Patient Portal offered by [facility] by registering at the following website: [email or web address]. By joining [employer]’s FollowMyHealth portal, you will also be able to view your health information using other applications (apps) compatible with our system.

## 2024-01-09 NOTE — PHYSICAL THERAPY INITIAL EVALUATION ADULT - ADDITIONAL COMMENTS
Patient lives in a house with her , +1 flight. Pt ambulates independently but has cane and rolling walker. Patient actively going to outpatient PT.     Patient left semi-supine in no apparent distress, +lines intact.

## 2024-01-09 NOTE — DISCHARGE NOTE PROVIDER - NSDCCPCAREPLAN_GEN_ALL_CORE_FT
PRINCIPAL DISCHARGE DIAGNOSIS  Diagnosis: Atrial fibrillation with rapid ventricular response  Assessment and Plan of Treatment: you HR is elevated , we restarted your home meds , your cardiologist will see you at [facility] , please follow up their recomendation.     PRINCIPAL DISCHARGE DIAGNOSIS  Diagnosis: Atrial fibrillation with rapid ventricular response  Assessment and Plan of Treatment: you HR is elevated , we restarted your home meds , your cardiologist will see you at [facility] , please follow up their recomendation.     PRINCIPAL DISCHARGE DIAGNOSIS  Diagnosis: Atrial fibrillation with rapid ventricular response  Assessment and Plan of Treatment: you HR is elevated , we restarted your home meds , your cardiologist will see you at [facility] , please follow up their recomendation.     PRINCIPAL DISCHARGE DIAGNOSIS  Diagnosis: Atrial fibrillation with rapid ventricular response  Assessment and Plan of Treatment: Please continue your medications as directed and follow-up with your primary provider/cardiologist to further manage your care. Please reach out to your [facility] cardiologist for ablation.  Monitor for signs/symptoms of uncontrolled atrial fibrillation, such as, increased heart rate, palpitations, chest pain, dizziness, or shortness of breath - Return to emergency room if these signs/symptoms are present.      SECONDARY DISCHARGE DIAGNOSES  Diagnosis: Hyperlipidemia  Assessment and Plan of Treatment: Low fat diet, exercise daily and continue current medications. Follow up with primary care physician and cardiologist for management.    Diagnosis: Hypothyroidism  Assessment and Plan of Treatment: Please continue your thyroid medication, Synthroid, as prescribed and recommended. Follow up with your primary care physician for continual thyroid function testing within 4-6 weeks for routine labs.    Diagnosis: Hypertension  Assessment and Plan of Treatment: Low sodium and fat diet, continue anti-hypertensive medications, and follow up with primary care physician.    Diagnosis: History of COPD  Assessment and Plan of Treatment: Continue current medications as prescribed. Follow up with your primary care physician.    Diagnosis: Iron deficiency anemia  Assessment and Plan of Treatment: You have gotten iron infusions during your hospitalizaiton. Follow up your routine physician's appointments.  If you notice any bleeding, please notify your doctor immediately or go to the nearest emergency room.     PRINCIPAL DISCHARGE DIAGNOSIS  Diagnosis: Atrial fibrillation with rapid ventricular response  Assessment and Plan of Treatment: Please continue your medications as directed and follow-up with your primary provider/cardiologist to further manage your care. Please reach out to your [facility] cardiologist for ablation.  Monitor for signs/symptoms of uncontrolled atrial fibrillation, such as, increased heart rate, palpitations, chest pain, dizziness, or shortness of breath - Return to emergency room if these signs/symptoms are present.      SECONDARY DISCHARGE DIAGNOSES  Diagnosis: Hyperlipidemia  Assessment and Plan of Treatment: Low fat diet, exercise daily and continue current medications. Follow up with primary care physician and cardiologist for management.    Diagnosis: Hypothyroidism  Assessment and Plan of Treatment: Please continue your thyroid medication, Synthroid, as prescribed and recommended. Follow up with your primary care physician for continual thyroid function testing within 4-6 weeks for routine labs.    Diagnosis: Hypertension  Assessment and Plan of Treatment: Low sodium and fat diet, continue anti-hypertensive medications, and follow up with primary care physician.    Diagnosis: History of COPD  Assessment and Plan of Treatment: Continue current medications as prescribed. Follow up with your primary care physician.    Diagnosis: Iron deficiency anemia  Assessment and Plan of Treatment: You have gotten iron infusions during your hospitalizaiton. Follow up your routine physician's appointments.  If you notice any bleeding, please notify your doctor immediately or go to the nearest emergency room.     PRINCIPAL DISCHARGE DIAGNOSIS  Diagnosis: Atrial fibrillation with rapid ventricular response  Assessment and Plan of Treatment: Please continue your medications as directed and follow-up with your primary provider/cardiologist to further manage your care. Please reach out to your [facility] cardiologist for ablation.  Monitor for signs/symptoms of uncontrolled atrial fibrillation, such as, increased heart rate, palpitations, chest pain, dizziness, or shortness of breath - Return to emergency room if these signs/symptoms are present.      SECONDARY DISCHARGE DIAGNOSES  Diagnosis: Hyperlipidemia  Assessment and Plan of Treatment: Low fat diet, exercise daily and continue current medications. Follow up with primary care physician and cardiologist for management.    Diagnosis: Hypothyroidism  Assessment and Plan of Treatment: Please continue your thyroid medication, Synthroid, as prescribed and recommended. Follow up with your primary care physician for continual thyroid function testing within 4-6 weeks for routine labs.    Diagnosis: Hypertension  Assessment and Plan of Treatment: Low sodium and fat diet, continue anti-hypertensive medications, and follow up with primary care physician.    Diagnosis: History of COPD  Assessment and Plan of Treatment: Continue current medications as prescribed. Follow up with your primary care physician.    Diagnosis: Iron deficiency anemia  Assessment and Plan of Treatment: You have gotten iron infusions during your hospitalizaiton. Follow up your routine physician's appointments.  If you notice any bleeding, please notify your doctor immediately or go to the nearest emergency room.

## 2024-01-09 NOTE — PATIENT PROFILE ADULT - FUNCTIONAL ASSESSMENT - BASIC MOBILITY 6.
4-calculated by average/Not able to assess (calculate score using [facility] averaging method)  4-calculated by average/Not able to assess (calculate score using [facility] averaging method) 4 = No assist / stand by assistance

## 2024-01-09 NOTE — DISCHARGE NOTE NURSING/CASE MANAGEMENT/SOCIAL WORK - NSSCNAMETXT_GEN_ALL_CORE
[facility] [phone]. A nurse will call prior to home visit   [facility] [phone]. A nurse will call prior to home visit

## 2024-01-09 NOTE — DISCHARGE NOTE PROVIDER - ATTENDING DISCHARGE PHYSICAL EXAMINATION:
.  VITAL SIGNS:  T(C): 36.6 (01-09-24 @ 05:55), Max: 38.1 (01-09-24 @ 00:51)  T(F): 97.9 (01-09-24 @ 05:55), Max: 100.6 (01-09-24 @ 00:51)  HR: 98 (01-09-24 @ 05:55) (62 - 160)  BP: 117/94 (01-09-24 @ 05:55) (117/94 - 175/60)  BP(mean): --  RR: 18 (01-09-24 @ 05:55) (15 - 19)  SpO2: 96% (01-09-24 @ 05:55) (88% - 100%)  Wt(kg): --    PHYSICAL EXAM:    Constitutional: WDWN resting comfortably in bed; NAD, anxious   Head: NC/AT  Eyes: PERRL, EOMI, clear conjunctiva  ENT: no nasal discharge; uvula midline, no oropharyngeal erythema or exudates;   Neck: supple; no JVD or thyromegaly  Respiratory: CTA B/L; no W/R/R, no retractions  Cardiac: irregularly irregular rhythm/ fast rate   Gastrointestinal: soft, NT/ND; no rebound or guarding; +BSx4  Genitourinary: normal external genitalia  Back: spine midline, no bony tenderness or step-offs; no CVAT B/L  Extremities: WWP, no clubbing or cyanosis; no peripheral edema  Musculoskeletal: NROM x4; no joint swelling, tenderness or erythema  Neurologic: AAOx3; CNII-XII grossly intact; no focal deficits  Psychiatric: affect and characteristics of appearance, verbalizations, behaviors are appropriate

## 2024-01-09 NOTE — PATIENT PROFILE ADULT - FALL HARM RISK - HARM RISK INTERVENTIONS
Assistance with ambulation/Assistance OOB with selected safe patient handling equipment/Communicate Risk of Fall with Harm to all staff/Reinforce activity limits and safety measures with patient and family/Review medications for side effects contributing to fall risk/Sit up slowly, dangle for a short time, stand at bedside before walking/Tailored Fall Risk Interventions/Toileting schedule using arm’s reach rule for commode and bathroom/Visual Cue: Yellow wristband and red socks/Bed in lowest position, wheels locked, appropriate side rails in place/Call bell, personal items and telephone in reach/Instruct patient to call for assistance before getting out of bed or chair/Non-slip footwear when patient is out of bed/[place] to call system/Physically safe environment - no spills, clutter or unnecessary equipment/Purposeful Proactive Rounding/Room/bathroom lighting operational, light cord in reach Assistance with ambulation/Assistance OOB with selected safe patient handling equipment/Communicate Risk of Fall with Harm to all staff/Reinforce activity limits and safety measures with patient and family/Review medications for side effects contributing to fall risk/Sit up slowly, dangle for a short time, stand at bedside before walking/Tailored Fall Risk Interventions/Toileting schedule using arm’s reach rule for commode and bathroom/Visual Cue: Yellow wristband and red socks/Bed in lowest position, wheels locked, appropriate side rails in place/Call bell, personal items and telephone in reach/Instruct patient to call for assistance before getting out of bed or chair/Non-slip footwear when patient is out of bed/[employer] to call system/Physically safe environment - no spills, clutter or unnecessary equipment/Purposeful Proactive Rounding/Room/bathroom lighting operational, light cord in reach

## 2024-01-09 NOTE — DISCHARGE NOTE PROVIDER - DISCHARGE SERVICE FOR PATIENT
on the discharge service for the patient. I have reviewed and made amendments to the documentation where necessary. Tazorac Counseling:  Patient advised that medication is irritating and drying.  Patient may need to apply sparingly and wash off after an hour before eventually leaving it on overnight.  The patient verbalized understanding of the proper use and possible adverse effects of tazorac.  All of the patient's questions and concerns were addressed.

## 2024-01-09 NOTE — DISCHARGE NOTE PROVIDER - CARE PROVIDER_API CALL
Pt arrived calm denies pain or nausea report received from CRNA and RN circulator port removal Primary Care Doctor,   Phone: (   )    -  Fax: (   )    -  Follow Up Time:    Primary Care Doctor,   Phone: (   )    -  Fax: (   )    -  Follow Up Time:     Your cardiologist Dr. Guzman,   [facility] Cardiologist  Phone: (   )    -  Fax: (   )    -  Follow Up Time: 1 week   Primary Care Doctor,   Phone: (   )    -  Fax: (   )    -  Follow Up Time:     Your cardiologist Dr. Guzman,   [facility] Cardiologist  Phone: (   )    -  Fax: (   )    -  Follow Up Time: 1 week   Primary Care Doctor,   Phone: (   )    -  Fax: (   )    -  Follow Up Time:     Your cardiologist Dr. Guzman,   [facility] Cardiologist  Phone: (   )    -  Fax: (   )    -  Follow Up Time: 1 week

## 2024-01-09 NOTE — PROVIDER CONTACT NOTE (OTHER) - ACTION/TREATMENT ORDERED:
As per provider erasmo figueroa (#[id number]), recheck BP and heart rate at 1900. As per provider erasmo figueroa (#[id number]), recheck BP and heart rate at 1900.

## 2024-01-09 NOTE — PHYSICAL THERAPY INITIAL EVALUATION ADULT - GENERAL OBSERVATIONS, REHAB EVAL
Patient received semi-supine on ED stretcher, in no apparent distress, +telemetry, +IV, agreeable to participate. HR 77-78 BPM, 100% oxygen saturation on room air.

## 2024-01-09 NOTE — DISCHARGE NOTE PROVIDER - HOSPITAL COURSE
83yr old female with a pmh of HTN, Dyslipidemia, CAD s/p PCI, COPD, Hashimoto's Thyroiditis, Recurrent UTIs, Spinal Stenosis s/p Laminectomy, GEORGIE, TIAs, paroxysmal afib on xarelto who presents with concern for an allergic reaction while receiving an iron infusion today. This is her second iron infusion (no rxn with 1st). She had increased WOB and SOB. She received epi/solumedrol (16:30) and after was "shaking" for a few minutes. Found to be in afib with RVR.   Denies  headache, dizziness, chest pain, palpitations, abdominal pain, joint pain, diarrhea/constipation, urinary symptoms.   Vitals: T 100.3, HR 95 (150s), /69, RR 18b satting 97% on 3L venturi mask.  Patient now on RA, clinically improved , except A fib w/ RVR, d/w EP at [place], accepted the patient , now pending transfer.  Will restart Xarelto, Metoprolol  and rest of home meds for DC. Patient has ILR, EP will follow up at [facility]. 83yr old female with a pmh of HTN, Dyslipidemia, CAD s/p PCI, COPD, Hashimoto's Thyroiditis, Recurrent UTIs, Spinal Stenosis s/p Laminectomy, GEORGIE, TIAs, paroxysmal afib on xarelto who presents with concern for an allergic reaction while receiving an iron infusion today. This is her second iron infusion (no rxn with 1st). She had increased WOB and SOB. She received epi/solumedrol (16:30) and after was "shaking" for a few minutes. Found to be in afib with RVR.   Denies  headache, dizziness, chest pain, palpitations, abdominal pain, joint pain, diarrhea/constipation, urinary symptoms.   Vitals: T 100.3, HR 95 (150s), /69, RR 18b satting 97% on 3L venturi mask.  Patient now on RA, clinically improved , except A fib w/ RVR, d/w EP at [place], accepted the patient , now pending transfer.  Will restart Xarelto, Metoprolol  and rest of home meds for DC. Patient has ILR, EP will follow up at [facility]. 83yr old female with a pmh of HTN, Dyslipidemia, CAD s/p PCI, COPD, Hashimoto's Thyroiditis, Recurrent UTIs, Spinal Stenosis s/p Laminectomy, GEORGIE, TIAs, paroxysmal afib on xarelto who presents with concern for an allergic reaction while receiving an iron infusion today. This is her second iron infusion (no rxn with 1st). She had increased WOB and SOB. She received epi/solumedrol (16:30) and after was "shaking" for a few minutes. Found to be in afib with RVR.   Denies  headache, dizziness, chest pain, palpitations, abdominal pain, joint pain, diarrhea/constipation, urinary symptoms.   Vitals: T 100.3, HR 95 (150s), /69, RR 18b satting 97% on 3L venturi mask.  Patient now on RA, clinically improved , except A fib w/ RVR, d/w EP at [place], accepted the patient , now pending transfer.  Will restart Xarelto, Metoprolol  and rest of home meds for DC. Patient has ILR, EP will follow up at [facility]. 82 YO F with PMHx of HTN, HLD, CAD s/p PCI, Hashimoto Thyroiditis, COPD (no smoking hx but lived with father of whom smoked), Recurrent UTIs, Spinal Stenosis s/p Laminectomy, GEORGIE, TIA, and PAFIB s/p failed cadioversion (12/2023) on xarelto who presented with concern for an allergic reaction while receiving second iron infusion on 1/8 s/p epi/benadryl/pepcid with course complicated by COPD exacerbation s/p duonebs and started on steroids with improvement. Course complicated by questionable fever (non documented) however BCx, RVP, COVID and MRSA PCR negative. UCx with 10/49 E.Faecalis and PSA. CAP ABX given x 1 dose however ABX held for now. Course further complicated by AFIB RVR s/p cardizem and resumed on home lopressor and titrating up for better control.     Atrial fibrillation with rapid ventricular response.   - [id number] 6  - s/p diltiazem 60mg PO x1,  diltiazem 20mg IV x1  - Continue home metoprolol tartrate 50mg BID, uptitrate as tolerated  - continue Xarelto   - ECHO ordered  - tele monitoring     Acute respiratory failure with hypoxia.   - CXR: Bilateral perihilar interstitial opacities possibly secondary to mild pulmonary edema. Small left pleural effusion.  - s/p duoneb, epi/benadryl/famotidine, rocephin/azithromycin   - ED reported wheezing on exam, pt with hx of bronchitis, no smoking hx but lived with father who smoked when younger  - Continue duonebs and prednisone 40mg daily   - Wean O2 as tolerated  - if signs/symptoms of fluid overload develop can consider small dose of lasix.    Acute asthma exacerbation.   - As per problem #2.    Iron deficiency anemia.   - receiving iron infusions- today was the second infusion  - h/h stable- continue to monitor, consider stopping iron transfusions.    Benign essential HTN.   - s/p diltiazem 60mg PO x1,  diltiazem 20mg IV x1  - Continue home metoprolol tartrate 50mg BID, lisinopril 20mg BID  - Monitor.    Hypothyroidism.   - continue levothyroxine 75mcg daily.    HLD (hyperlipidemia).   - Continue crestor 10mg daily formulary.    Patient has ILR, EP will follow up at [facility]. On 01/13/2024, case was discussed with Dr. Dr. Light, patient is medically cleared and optimized for discharge today. All medications were reviewed with attending, and sent to mutually agreed upon pharmacy. 82 YO F with PMHx of HTN, HLD, CAD s/p PCI, Hashimoto Thyroiditis, COPD (no smoking hx but lived with father of whom smoked), Recurrent UTIs, Spinal Stenosis s/p Laminectomy, GEORGIE, TIA, and PAFIB s/p failed cadioversion (12/2023) on xarelto who presented with concern for an allergic reaction while receiving second iron infusion on 1/8 s/p epi/benadryl/pepcid with course complicated by COPD exacerbation s/p duonebs and started on steroids with improvement. Course complicated by questionable fever (non documented) however BCx, RVP, COVID and MRSA PCR negative. UCx with 10/49 E.Faecalis and PSA. CAP ABX given x 1 dose however ABX held for now. Course further complicated by AFIB RVR s/p cardizem and resumed on home lopressor and titrating up for better control.     Atrial fibrillation with rapid ventricular response.   - [id number] 6  - s/p diltiazem 60mg PO x1,  diltiazem 20mg IV x1  - Continue home metoprolol tartrate 50mg BID, uptitrate as tolerated  - continue Xarelto   - ECHO ordered  - tele monitoring     Acute respiratory failure with hypoxia.   - CXR: Bilateral perihilar interstitial opacities possibly secondary to mild pulmonary edema. Small left pleural effusion.  - s/p duoneb, epi/benadryl/famotidine, rocephin/azithromycin   - ED reported wheezing on exam, pt with hx of bronchitis, no smoking hx but lived with father who smoked when younger  - Continue duonebs and prednisone 40mg daily   - Wean O2 as tolerated  - if signs/symptoms of fluid overload develop can consider small dose of lasix.    Acute asthma exacerbation.   - As per problem #2.    Iron deficiency anemia.   - receiving iron infusions- today was the second infusion  - h/h stable- continue to monitor, consider stopping iron transfusions.    Benign essential HTN.   - s/p diltiazem 60mg PO x1,  diltiazem 20mg IV x1  - Continue home metoprolol tartrate 50mg BID, lisinopril 20mg BID  - Monitor.    Hypothyroidism.   - continue levothyroxine 75mcg daily.    HLD (hyperlipidemia).   - Continue crestor 10mg daily formulary.    Patient has ILR, EP will follow up at [facility]. On 01/13/2024, case was discussed with Dr. Dr. Light, patient is medically cleared and optimized for discharge today. All medications were reviewed with attending, and sent to mutually agreed upon pharmacy. 82 YO F with PMHx of HTN, HLD, CAD s/p PCI, Hashimoto Thyroiditis, COPD (no smoking hx but lived with father of whom smoked), Recurrent UTIs, Spinal Stenosis s/p Laminectomy, GEORGIE, TIA, and PAFIB s/p failed cadioversion (12/2023) on xarelto who presented with concern for an allergic reaction while receiving second iron infusion on 1/8 s/p epi/benadryl/pepcid with course complicated by COPD exacerbation s/p duonebs and started on steroids with improvement. Course complicated by questionable fever (non documented) however BCx, RVP, COVID and MRSA PCR negative. UCx with 10/49 E.Faecalis and PSA. CAP ABX given x 1 dose however ABX held for now. Course further complicated by AFIB RVR s/p cardizem and resumed on home lopressor and titrating up for better control.     Atrial fibrillation with rapid ventricular response.   - [id number] 6  - s/p diltiazem 60mg PO x1,  diltiazem 20mg IV x1  - Continue home metoprolol tartrate 50mg BID, uptitrate as tolerated  - continue Xarelto   - ECHO ordered  - tele monitoring     Acute respiratory failure with hypoxia.   - CXR: Bilateral perihilar interstitial opacities possibly secondary to mild pulmonary edema. Small left pleural effusion.  - s/p duoneb, epi/benadryl/famotidine, rocephin/azithromycin   - ED reported wheezing on exam, pt with hx of bronchitis, no smoking hx but lived with father who smoked when younger  - Continue duonebs and prednisone 40mg daily   - Wean O2 as tolerated  - if signs/symptoms of fluid overload develop can consider small dose of lasix.    Acute asthma exacerbation.   - As per problem #2.    Iron deficiency anemia.   - receiving iron infusions- today was the second infusion  - h/h stable- continue to monitor, consider stopping iron transfusions.    Benign essential HTN.   - s/p diltiazem 60mg PO x1,  diltiazem 20mg IV x1  - Continue home metoprolol tartrate 50mg BID, lisinopril 20mg BID  - Monitor.    Hypothyroidism.   - continue levothyroxine 75mcg daily.    HLD (hyperlipidemia).   - Continue crestor 10mg daily formulary.    Patient has ILR, EP will follow up at [facility]. On 01/13/2024, case was discussed with Dr. Dr. Light, patient is medically cleared and optimized for discharge today. All medications were reviewed with attending, and sent to mutually agreed upon pharmacy. 84 YO F with PMHx of HTN, HLD, CAD s/p PCI, Hashimoto Thyroiditis, COPD (no smoking hx but lived with father of whom smoked), Recurrent UTIs, Spinal Stenosis s/p Laminectomy, GEORGIE, TIA, and PAFIB s/p failed cadioversion (12/2023) on xarelto who presented with concern for an allergic reaction while receiving second iron infusion on 1/8 s/p epi/benadryl/pepcid with course complicated by COPD exacerbation s/p duonebs and started on steroids with improvement. Course complicated by questionable fever (non documented) however BCx, RVP, COVID and MRSA PCR negative. UCx with 10/49 E.Faecalis and PSA. CAP ABX given x 1 dose however ABX held for now. Course further complicated by AFIB RVR s/p cardizem and resumed on home lopressor and titrating up for better control.     Atrial fibrillation with rapid ventricular response.   - [id number] 6  - s/p diltiazem 60mg PO x1,  diltiazem 20mg IV x1  - Continue home metoprolol tartrate 50mg BID, uptitrate as tolerated  - continue Xarelto   - ECHO ordered  - tele monitoring     Acute respiratory failure with hypoxia.   - CXR: Bilateral perihilar interstitial opacities possibly secondary to mild pulmonary edema. Small left pleural effusion.  - s/p duoneb, epi/benadryl/famotidine, rocephin/azithromycin   - ED reported wheezing on exam, pt with hx of bronchitis, no smoking hx but lived with father who smoked when younger  - Continue duonebs and prednisone 40mg daily   - Wean O2 as tolerated  - if signs/symptoms of fluid overload develop can consider small dose of lasix.    Acute asthma exacerbation.   - As per problem #2.    Iron deficiency anemia.   - receiving iron infusions- today was the second infusion  - h/h stable- continue to monitor, consider stopping iron transfusions.    Benign essential HTN.   - s/p diltiazem 60mg PO x1,  diltiazem 20mg IV x1  - Continue home metoprolol tartrate 50mg BID, lisinopril 20mg BID  - Monitor.    Hypothyroidism.   - continue levothyroxine 75mcg daily.    HLD (hyperlipidemia).   - Continue crestor 10mg daily formulary.    Patient has ILR, EP will follow up at [facility]. On 01/14/2024, case was discussed with Dr. Dr. Light, patient is medically cleared and optimized for discharge today. All medications were reviewed with attending, and sent to mutually agreed upon pharmacy. 84 YO F with PMHx of HTN, HLD, CAD s/p PCI, Hashimoto Thyroiditis, COPD (no smoking hx but lived with father of whom smoked), Recurrent UTIs, Spinal Stenosis s/p Laminectomy, GEORGIE, TIA, and PAFIB s/p failed cadioversion (12/2023) on xarelto who presented with concern for an allergic reaction while receiving second iron infusion on 1/8 s/p epi/benadryl/pepcid with course complicated by COPD exacerbation s/p duonebs and started on steroids with improvement. Course complicated by questionable fever (non documented) however BCx, RVP, COVID and MRSA PCR negative. UCx with 10/49 E.Faecalis and PSA. CAP ABX given x 1 dose however ABX held for now. Course further complicated by AFIB RVR s/p cardizem and resumed on home lopressor and titrating up for better control.     Atrial fibrillation with rapid ventricular response.   - [id number] 6  - s/p diltiazem 60mg PO x1,  diltiazem 20mg IV x1  - Continue home metoprolol tartrate 50mg BID, uptitrate as tolerated  - continue Xarelto   - ECHO ordered  - tele monitoring     Acute respiratory failure with hypoxia.   - CXR: Bilateral perihilar interstitial opacities possibly secondary to mild pulmonary edema. Small left pleural effusion.  - s/p duoneb, epi/benadryl/famotidine, rocephin/azithromycin   - ED reported wheezing on exam, pt with hx of bronchitis, no smoking hx but lived with father who smoked when younger  - Continue duonebs and prednisone 40mg daily   - Wean O2 as tolerated  - if signs/symptoms of fluid overload develop can consider small dose of lasix.    Acute asthma exacerbation.   - As per problem #2.    Iron deficiency anemia.   - receiving iron infusions- today was the second infusion  - h/h stable- continue to monitor, consider stopping iron transfusions.    Benign essential HTN.   - s/p diltiazem 60mg PO x1,  diltiazem 20mg IV x1  - Continue home metoprolol tartrate 50mg BID, lisinopril 20mg BID  - Monitor.    Hypothyroidism.   - continue levothyroxine 75mcg daily.    HLD (hyperlipidemia).   - Continue crestor 10mg daily formulary.    Patient has ILR, EP will follow up at [facility]. On 01/14/2024, case was discussed with Dr. Dr. Light, patient is medically cleared and optimized for discharge today. All medications were reviewed with attending, and sent to mutually agreed upon pharmacy. 84 YO F with PMHx of HTN, HLD, CAD s/p PCI, Hashimoto Thyroiditis, COPD (no smoking hx but lived with father of whom smoked), Recurrent UTIs, Spinal Stenosis s/p Laminectomy, GEORGIE, TIA, and PAFIB s/p failed cadioversion (12/2023) on xarelto who presented with concern for an allergic reaction while receiving second iron infusion on 1/8 s/p epi/benadryl/pepcid with course complicated by COPD exacerbation s/p duonebs and started on steroids with improvement. Course complicated by questionable fever (non documented) however BCx, RVP, COVID and MRSA PCR negative. UCx with 10/49 E.Faecalis and PSA. CAP ABX given x 1 dose however ABX held for now. Course further complicated by AFIB RVR s/p cardizem and resumed on home lopressor and titrating up for better control.     Atrial fibrillation with rapid ventricular response.   - [id number] 6  - s/p diltiazem 60mg PO x1,  diltiazem 20mg IV x1  - Continue home metoprolol tartrate 50mg BID, uptitrate as tolerated  - continue Xarelto   - ECHO ordered  - tele monitoring     Acute respiratory failure with hypoxia.   - CXR: Bilateral perihilar interstitial opacities possibly secondary to mild pulmonary edema. Small left pleural effusion.  - s/p duoneb, epi/benadryl/famotidine, rocephin/azithromycin   - ED reported wheezing on exam, pt with hx of bronchitis, no smoking hx but lived with father who smoked when younger  - Continue duonebs and prednisone 40mg daily   - Wean O2 as tolerated  - if signs/symptoms of fluid overload develop can consider small dose of lasix.    Acute asthma exacerbation.   - As per problem #2.    Iron deficiency anemia.   - receiving iron infusions- today was the second infusion  - h/h stable- continue to monitor, consider stopping iron transfusions.    Benign essential HTN.   - s/p diltiazem 60mg PO x1,  diltiazem 20mg IV x1  - Continue home metoprolol tartrate 50mg BID, lisinopril 20mg BID  - Monitor.    Hypothyroidism.   - continue levothyroxine 75mcg daily.    HLD (hyperlipidemia).   - Continue crestor 10mg daily formulary.    Patient has ILR, EP will follow up at [facility]. On 01/14/2024, case was discussed with Dr. Dr. Light, patient is medically cleared and optimized for discharge today. All medications were reviewed with attending, and sent to mutually agreed upon pharmacy.

## 2024-01-09 NOTE — PROVIDER CONTACT NOTE (OTHER) - ASSESSMENT
Patient is A&Ox4, Patient denies pain, chest pain, shortness of breath, nausea, vomiting or dizziness.

## 2024-01-09 NOTE — PROVIDER CONTACT NOTE (OTHER) - ACTION/TREATMENT ORDERED:
As per provider erasmo figueroa (#[id number]) no further interventions needed at this time. As per provider erasmo figueroa (#[id number]) no further interventions needed at this time.

## 2024-01-09 NOTE — PROVIDER CONTACT NOTE (OTHER) - RECOMMENDATIONS
As per provider erasmo figueroa (#[id number]) no further interventions needed at this time. As per provider erasmo figueroa (#[id number]) no further interventions needed at this time.

## 2024-01-09 NOTE — DISCHARGE NOTE NURSING/CASE MANAGEMENT/SOCIAL WORK - NSDCVIVACCINE_GEN_ALL_CORE_FT
Tdap; 26-Nov-2018 17:04; Phil Centeno (RN); [employer]; [id number] (Exp. Date: 12-Mar-2020); IntraMuscular; Deltoid Left.; 0.5 milliLiter(s); VIS (VIS Published: 09-May-2013, VIS Presented: 26-Nov-2018);    Tdap; 26-Nov-2018 17:04; Phil Cetneno (RN); [employer]; [id number] (Exp. Date: 12-Mar-2020); IntraMuscular; Deltoid Left.; 0.5 milliLiter(s); VIS (VIS Published: 09-May-2013, VIS Presented: 26-Nov-2018);

## 2024-01-09 NOTE — DISCHARGE NOTE NURSING/CASE MANAGEMENT/SOCIAL WORK - NSDCPEFALRISK_GEN_ALL_CORE
For information on Fall & Injury Prevention, visit: [email or web address] OR  [email or web address] OR  https://www.cdc.gov/steadi/patient.html For information on Fall & Injury Prevention, visit: [email or web address] OR  [email or web address] OR  https://www.cdc.gov/steadi/patient.html

## 2024-01-09 NOTE — DISCHARGE NOTE PROVIDER - NSDCMRMEDTOKEN_GEN_ALL_CORE_FT
aspirin 81 mg oral capsule: 1 cap(s) orally every other day  estradiol 0.1 mg/g vaginal cream: 1 gram(s) intravaginally 2 times a day  lisinopril 20 mg oral tablet: 1 tab(s) orally 2 times a day  metoprolol tartrate 50 mg oral tablet: 1 tab(s) orally 2 times a day  rosuvastatin 10 mg oral capsule: 1 cap(s) orally once a day  Synthroid 75 mcg (0.075 mg) oral tablet: 1 tab(s) orally once a day  Xarelto 15 mg oral tablet: 1 tab(s) orally once a day   aspirin 81 mg oral capsule: 1 cap(s) orally every other day  estradiol 0.1 mg/g vaginal cream: 1 gram(s) intravaginally 2 times a day  ipratropium-albuterol 0.5 mg-2.5 mg/3 mL inhalation solution: 3 milliliter(s) inhaled every 6 hours  levothyroxine 75 mcg (0.075 mg) oral tablet: 1 tab(s) orally once a day  lisinopril 20 mg oral tablet: 1 tab(s) orally 2 times a day  melatonin 3 mg oral tablet: 1 tab(s) orally once a day (at bedtime) As needed Insomnia  metoprolol tartrate 50 mg oral tablet: 1 tab(s) orally 2 times a day  predniSONE 20 mg oral tablet: 2 tab(s) orally once a day  rosuvastatin 10 mg oral capsule: 1 cap(s) orally once a day  Xarelto 15 mg oral tablet: 1 tab(s) orally once a day   aspirin 81 mg oral capsule: 1 cap(s) orally every other day  estradiol 0.1 mg/g vaginal cream: 1 gram(s) intravaginally 2 times a day  ipratropium-albuterol 0.5 mg-2.5 mg/3 mL inhalation solution: 3 milliliter(s) inhaled every 6 hours  levothyroxine 75 mcg (0.075 mg) oral tablet: 1 tab(s) orally once a day  lisinopril 20 mg oral tablet: 1 tab(s) orally 2 times a day  melatonin 3 mg oral tablet: 1 tab(s) orally once a day (at bedtime) As needed Insomnia  predniSONE 20 mg oral tablet: 2 tab(s) orally once a day  rosuvastatin 10 mg oral capsule: 1 cap(s) orally once a day  Xarelto 15 mg oral tablet: 1 tab(s) orally once a day   aspirin 81 mg oral capsule: 1 cap(s) orally every other day  estradiol 0.1 mg/g vaginal cream: 1 gram(s) intravaginally 2 times a day  ipratropium-albuterol 0.5 mg-2.5 mg/3 mL inhalation solution: 3 milliliter(s) inhaled every 6 hours  levothyroxine 75 mcg (0.075 mg) oral tablet: 1 tab(s) orally once a day  lisinopril 20 mg oral tablet: 1 tab(s) orally 2 times a day  melatonin 3 mg oral tablet: 1 tab(s) orally once a day (at bedtime) As needed Insomnia  outpatient physical therapy: as needed  predniSONE 20 mg oral tablet: 2 tab(s) orally once a day  rosuvastatin 10 mg oral capsule: 1 cap(s) orally once a day  Xarelto 15 mg oral tablet: 1 tab(s) orally once a day   aspirin 81 mg oral capsule: 1 cap(s) orally every other day  budesonide-formoterol 160 mcg-4.5 mcg/inh inhalation aerosol: 2 puff(s) inhaled 2 times a day  estradiol 0.1 mg/g vaginal cream: 1 gram(s) intravaginally 2 times a day  levothyroxine 75 mcg (0.075 mg) oral tablet: 1 tab(s) orally once a day  lisinopril 20 mg oral tablet: 1 tab(s) orally 2 times a day  melatonin 3 mg oral tablet: 1 tab(s) orally once a day (at bedtime) As needed Insomnia  metoprolol tartrate 75 mg oral tablet: 1 tab(s) orally 2 times a day Hold medicaton if SBP &lt; 100 and HR &lt; 60 MDD: 2  rosuvastatin 10 mg oral capsule: 1 cap(s) orally once a day  Xarelto 15 mg oral tablet: 1 tab(s) orally once a day

## 2024-01-09 NOTE — DISCHARGE NOTE PROVIDER - PROVIDER TOKENS
FREE:[LAST:[Primary Care Doctor],PHONE:[(   )    -],FAX:[(   )    -]] FREE:[LAST:[Primary Care Doctor],PHONE:[(   )    -],FAX:[(   )    -]],FREE:[LAST:[Your cardiologist Dr. Guzman],PHONE:[(   )    -],FAX:[(   )    -],ADDRESS:[[facility] Cardiologist],FOLLOWUP:[1 week]] FREE:[LAST:[Primary Care Doctor],PHONE:[(   )    -],FAX:[(   )    -]],FREE:[LAST:[Your cardiologist Dr. Guzman],PHONE:[(   )    -],FAX:[(   )    -],ADDRESS:[[facility] Cardiologist],FOLLOWUP:[1 week]] FREE:[LAST:[Primary Care Doctor],PHONE:[(   )    -],FAX:[(   )    -]],FREE:[LAST:[Your cardiologist Dr. Guzman],PHONE:[(   )    -],FAX:[(   )    -],ADDRESS:[[facility] Cardiologist],FOLLOWUP:[1 week]]

## 2024-01-09 NOTE — PROVIDER CONTACT NOTE (OTHER) - RECOMMENDATIONS
As per provider erasmo figueroa (#[id number]), recheck BP and heart rate at 1900. As per provider erasmo figueroa (#[id number]), recheck BP and heart rate at 1900.

## 2024-01-10 PROCEDURE — 99232 SBSQ HOSP IP/OBS MODERATE 35: CPT

## 2024-01-10 RX ORDER — HYDRALAZINE HCL 50 MG
25 TABLET ORAL ONCE
Refills: 0 | Status: COMPLETED | OUTPATIENT
Start: 2024-01-10 | End: 2024-01-10

## 2024-01-10 RX ORDER — HYDRALAZINE HCL 50 MG
5 TABLET ORAL ONCE
Refills: 0 | Status: COMPLETED | OUTPATIENT
Start: 2024-01-10 | End: 2024-01-10

## 2024-01-10 RX ORDER — METOPROLOL TARTRATE 50 MG
50 TABLET ORAL ONCE
Refills: 0 | Status: COMPLETED | OUTPATIENT
Start: 2024-01-10 | End: 2024-01-10

## 2024-01-10 RX ORDER — LISINOPRIL 2.5 MG/1
20 TABLET ORAL ONCE
Refills: 0 | Status: COMPLETED | OUTPATIENT
Start: 2024-01-10 | End: 2024-01-10

## 2024-01-10 RX ADMIN — Medication 75 MICROGRAM(S): at 15:55

## 2024-01-10 RX ADMIN — Medication 50 MILLIGRAM(S): at 17:06

## 2024-01-10 RX ADMIN — Medication 25 MILLIGRAM(S): at 20:13

## 2024-01-10 RX ADMIN — Medication 25 MILLIGRAM(S): at 15:56

## 2024-01-10 RX ADMIN — Medication 50 MILLIGRAM(S): at 11:40

## 2024-01-10 RX ADMIN — Medication 50 MILLIGRAM(S): at 04:43

## 2024-01-10 RX ADMIN — LISINOPRIL 20 MILLIGRAM(S): 2.5 TABLET ORAL at 11:40

## 2024-01-10 RX ADMIN — Medication 40 MILLIGRAM(S): at 04:43

## 2024-01-10 RX ADMIN — LISINOPRIL 20 MILLIGRAM(S): 2.5 TABLET ORAL at 04:43

## 2024-01-10 RX ADMIN — LISINOPRIL 20 MILLIGRAM(S): 2.5 TABLET ORAL at 17:06

## 2024-01-10 RX ADMIN — RIVAROXABAN 15 MILLIGRAM(S): KIT at 17:06

## 2024-01-10 NOTE — CHART NOTE - NSCHARTNOTEFT_GEN_A_CORE
Patient A&O x 3, demonstrates poor medical decision making, pt mental status seems to wax and wane, says she is in the bank at times. Patient noted to be wandering around emergency room by nursing staff, concern for possible elopement. For patient safety, 1:1 ordered for concern for elopement.    Discussed with Dr Sal Viera PA-C,   Internal Medicine ACP   In house pager #[phone] Patient A&O x 3, demonstrates poor medical decision making, pt mental status seems to wax and wane, says she is in the bank at times. Patient noted to be wandering around emergency room by nursing staff, concern for possible elopement. For patient safety, 1:1 ordered for concern for elopement.    Discussed with Dr Sal Viera PA-C,   Internal Medicine ACP   In house pager #[phone]

## 2024-01-10 NOTE — PROVIDER CONTACT NOTE (OTHER) - ASSESSMENT
patient refusing IVP hydralazine; patient is AOx4 and able to refuse medication if needed. Patient states she will not take any medication if it is not ordered by her cardiologist.

## 2024-01-10 NOTE — PROVIDER CONTACT NOTE (OTHER) - ASSESSMENT
Patient refusing AM labs; patient states 'only my cardiologist is allowed to draw my blood." Educated patient on my AM labs are crucial in maintaining patient's care. Patient refusing AM labs; patient states "only my cardiologist is allowed to draw my blood." Educated patient on my AM labs are crucial in maintaining patient's care.

## 2024-01-10 NOTE — PROGRESS NOTE ADULT - SUBJECTIVE AND OBJECTIVE BOX
Medicine Progress Note    Patient is a 83y old  Female who presents with a chief complaint of afib with rvr, acute hypoxic respiratory failure 2/2 copd/asthma (09 Jan 2024 13:57)      SUBJECTIVE / OVERNIGHT EVENTS: Patient non compliant with meds, pending transfer to [facility]   Does not trust us here     ADDITIONAL REVIEW OF SYSTEMS: negative     MEDICATIONS  (STANDING):  albuterol/ipratropium for Nebulization 3 milliLiter(s) Nebulizer every 6 hours  atorvastatin 40 milliGRAM(s) Oral at bedtime  estradiol 0.01% Vaginal Cream 1 Gram(s) Vaginal <User Schedule>  levothyroxine 75 MICROGram(s) Oral daily  lisinopril 20 milliGRAM(s) Oral two times a day  metoprolol tartrate 50 milliGRAM(s) Oral two times a day  predniSONE   Tablet 40 milliGRAM(s) Oral daily  rivaroxaban 15 milliGRAM(s) Oral with dinner  sodium chloride 0.9%. 500 milliLiter(s) (50 mL/Hr) IV Continuous <Continuous>    MEDICATIONS  (PRN):  acetaminophen     Tablet .. 650 milliGRAM(s) Oral every 6 hours PRN Temp greater or equal to 38C (100.4F), Mild Pain (1 - 3)  aluminum hydroxide/magnesium hydroxide/simethicone Suspension 30 milliLiter(s) Oral every 4 hours PRN Dyspepsia  guaiFENesin Oral Liquid (Sugar-Free) 100 milliGRAM(s) Oral every 6 hours PRN Cough  melatonin 3 milliGRAM(s) Oral at bedtime PRN Insomnia  ondansetron Injectable 4 milliGRAM(s) IV Push every 8 hours PRN Nausea and/or Vomiting    CAPILLARY BLOOD GLUCOSE        I&O's Summary      PHYSICAL EXAM:  Vital Signs Last 24 Hrs  T(C): 36.4 (10 Brayan 2024 14:30), Max: 37 (09 Jan 2024 20:38)  T(F): 97.6 (10 Brayan 2024 14:30), Max: 98.6 (09 Jan 2024 20:38)  HR: 112 (10 Brayan 2024 14:30) (99 - 123)  BP: 156/112 (10 Brayan 2024 14:30) (156/112 - 203/139)  BP(mean): 156 (10 Brayan 2024 11:30) (156 - 156)  RR: 21 (10 Brayan 2024 14:30) (20 - 26)  SpO2: 98% (10 Brayan 2024 14:30) (95% - 100%)    Parameters below as of 10 Brayan 2024 14:30  Patient On (Oxygen Delivery Method): room air      CONSTITUTIONAL: NAD,   ENMT: Moist oral mucosa, no pharyngeal injection or exudates;   RESPIRATORY: Normal respiratory effort; lungs are clear to auscultation bilaterally  CARDIOVASCULAR: irregularly irregular ate and rhythm, normal S1 and S2,; No lower extremity edema;  ABDOMEN: Nontender to palpation, normoactive bowel sounds, no rebound/guarding;   PSYCH: A+O to person, place, and time; anxious   NEUROLOGY: CN 2-12 are intact and symmetric; no gross sensory deficits   SKIN: chronic  rashes;     LABS:                        13.3   12.82 )-----------( 283      ( 09 Jan 2024 06:07 )             40.6     01-09    137  |  100  |  10  ----------------------------<  175<H>  3.8   |  20<L>  |  0.44<L>    Ca    9.0      09 Jan 2024 06:07    TPro  7.2  /  Alb  3.6  /  TBili  0.8  /  DBili  x   /  AST  109<H>  /  ALT  52<H>  /  AlkPhos  224<H>  01-08    PT/INR - ( 08 Jan 2024 21:22 )   PT: 14.0 sec;   INR: 1.26 ratio         PTT - ( 09 Jan 2024 06:07 )  PTT:48.6 sec      Urinalysis Basic - ( 09 Jan 2024 06:07 )    Color: x / Appearance: x / SG: x / pH: x  Gluc: 175 mg/dL / Ketone: x  / Bili: x / Urobili: x   Blood: x / Protein: x / Nitrite: x   Leuk Esterase: x / RBC: x / WBC x   Sq Epi: x / Non Sq Epi: x / Bacteria: x        Culture - Blood (collected 09 Jan 2024 02:15)  Source: .Blood Blood-Venous  Preliminary Report (10 Brayan 2024 09:02):    No growth at 24 hours    Culture - Blood (collected 09 Jan 2024 02:00)  Source: .Blood Blood-Peripheral  Preliminary Report (10 Brayan 2024 09:02):    No growth at 24 hours      SARS-CoV-2: Luna (09 Jan 2024 03:24)      RADIOLOGY & ADDITIONAL TESTS:  Imaging from Last 24 Hours:    Electrocardiogram/QTc Interval:    COORDINATION OF CARE:  Care Discussed with Consultants/Other Providers: ACP    Medicine Progress Note    Patient is a 83y old  Female who presents with a chief complaint of afib with rvr, acute hypoxic respiratory failure 2/2 copd/asthma (09 Jan 2024 13:57)      SUBJECTIVE / OVERNIGHT EVENTS: Patient non compliant with meds, pending transfer to [facility]   Does not trust us here     ADDITIONAL REVIEW OF SYSTEMS: negative     MEDICATIONS  (STANDING):  albuterol/ipratropium for Nebulization 3 milliLiter(s) Nebulizer every 6 hours  atorvastatin 40 milliGRAM(s) Oral at bedtime  estradiol 0.01% Vaginal Cream 1 Gram(s) Vaginal <User Schedule>  levothyroxine 75 MICROGram(s) Oral daily  lisinopril 20 milliGRAM(s) Oral two times a day  metoprolol tartrate 50 milliGRAM(s) Oral two times a day  predniSONE   Tablet 40 milliGRAM(s) Oral daily  rivaroxaban 15 milliGRAM(s) Oral with dinner  sodium chloride 0.9%. 500 milliLiter(s) (50 mL/Hr) IV Continuous <Continuous>    MEDICATIONS  (PRN):  acetaminophen     Tablet .. 650 milliGRAM(s) Oral every 6 hours PRN Temp greater or equal to 38C (100.4F), Mild Pain (1 - 3)  aluminum hydroxide/magnesium hydroxide/simethicone Suspension 30 milliLiter(s) Oral every 4 hours PRN Dyspepsia  guaiFENesin Oral Liquid (Sugar-Free) 100 milliGRAM(s) Oral every 6 hours PRN Cough  melatonin 3 milliGRAM(s) Oral at bedtime PRN Insomnia  ondansetron Injectable 4 milliGRAM(s) IV Push every 8 hours PRN Nausea and/or Vomiting    CAPILLARY BLOOD GLUCOSE        I&O's Summary      PHYSICAL EXAM:  Vital Signs Last 24 Hrs  T(C): 36.4 (10 Brayan 2024 14:30), Max: 37 (09 Jan 2024 20:38)  T(F): 97.6 (10 Brayan 2024 14:30), Max: 98.6 (09 Jan 2024 20:38)  HR: 112 (10 Brayan 2024 14:30) (99 - 123)  BP: 156/112 (10 Brayan 2024 14:30) (156/112 - 203/139)  BP(mean): 156 (10 Brayan 2024 11:30) (156 - 156)  RR: 21 (10 Brayan 2024 14:30) (20 - 26)  SpO2: 98% (10 Brayan 2024 14:30) (95% - 100%)    Parameters below as of 10 Brayan 2024 14:30  Patient On (Oxygen Delivery Method): room air      CONSTITUTIONAL: NAD,   ENMT: Moist oral mucosa, no pharyngeal injection or exudates;   RESPIRATORY: Normal respiratory effort; lungs are clear to auscultation bilaterally  CARDIOVASCULAR: irregularly irregular ate and rhythm, normal S1 and S2,; No lower extremity edema;  ABDOMEN: Nontender to palpation, normoactive bowel sounds, no rebound/guarding;   PSYCH: A+O to person, place, and time; anxious   NEUROLOGY: CN 2-12 are intact and symmetric; no gross sensory deficits   SKIN: chronic  rashes;     LABS:                        13.3   12.82 )-----------( 283      ( 09 Jan 2024 06:07 )             40.6     01-09    137  |  100  |  10  ----------------------------<  175<H>  3.8   |  20<L>  |  0.44<L>    Ca    9.0      09 Jan 2024 06:07    TPro  7.2  /  Alb  3.6  /  TBili  0.8  /  DBili  x   /  AST  109<H>  /  ALT  52<H>  /  AlkPhos  224<H>  01-08    PT/INR - ( 08 Jan 2024 21:22 )   PT: 14.0 sec;   INR: 1.26 ratio         PTT - ( 09 Jan 2024 06:07 )  PTT:48.6 sec      Urinalysis Basic - ( 09 Jan 2024 06:07 )    Color: x / Appearance: x / SG: x / pH: x  Gluc: 175 mg/dL / Ketone: x  / Bili: x / Urobili: x   Blood: x / Protein: x / Nitrite: x   Leuk Esterase: x / RBC: x / WBC x   Sq Epi: x / Non Sq Epi: x / Bacteria: x        Culture - Blood (collected 09 Jan 2024 02:15)  Source: .Blood Blood-Venous  Preliminary Report (10 Brayan 2024 09:02):    No growth at 24 hours    Culture - Blood (collected 09 Jan 2024 02:00)  Source: .Blood Blood-Peripheral  Preliminary Report (10 Brayan 2024 09:02):    No growth at 24 hours      SARS-CoV-2: Luna (09 Jan 2024 03:24)      RADIOLOGY & ADDITIONAL TESTS:  Imaging from Last 24 Hours:    Electrocardiogram/QTc Interval:    COORDINATION OF CARE:  Care Discussed with Consultants/Other Providers: ACP

## 2024-01-11 PROCEDURE — 99232 SBSQ HOSP IP/OBS MODERATE 35: CPT

## 2024-01-11 RX ORDER — HYDRALAZINE HCL 50 MG
25 TABLET ORAL ONCE
Refills: 0 | Status: COMPLETED | OUTPATIENT
Start: 2024-01-11 | End: 2024-01-11

## 2024-01-11 RX ORDER — CHLORHEXIDINE GLUCONATE 213 G/1000ML
1 SOLUTION TOPICAL DAILY
Refills: 0 | Status: DISCONTINUED | OUTPATIENT
Start: 2024-01-11 | End: 2024-01-14

## 2024-01-11 RX ORDER — METOPROLOL TARTRATE 50 MG
5 TABLET ORAL ONCE
Refills: 0 | Status: COMPLETED | OUTPATIENT
Start: 2024-01-11 | End: 2024-01-11

## 2024-01-11 RX ADMIN — Medication 75 MICROGRAM(S): at 06:26

## 2024-01-11 RX ADMIN — LISINOPRIL 20 MILLIGRAM(S): 2.5 TABLET ORAL at 17:14

## 2024-01-11 RX ADMIN — Medication 50 MILLIGRAM(S): at 17:14

## 2024-01-11 RX ADMIN — Medication 3 MILLILITER(S): at 15:39

## 2024-01-11 RX ADMIN — Medication 3 MILLILITER(S): at 09:55

## 2024-01-11 RX ADMIN — Medication 25 MILLIGRAM(S): at 06:26

## 2024-01-11 RX ADMIN — Medication 5 MILLIGRAM(S): at 11:38

## 2024-01-11 RX ADMIN — Medication 50 MILLIGRAM(S): at 04:36

## 2024-01-11 RX ADMIN — Medication 25 MILLIGRAM(S): at 03:29

## 2024-01-11 RX ADMIN — RIVAROXABAN 15 MILLIGRAM(S): KIT at 17:14

## 2024-01-11 RX ADMIN — LISINOPRIL 20 MILLIGRAM(S): 2.5 TABLET ORAL at 04:36

## 2024-01-11 RX ADMIN — CHLORHEXIDINE GLUCONATE 1 APPLICATION(S): 213 SOLUTION TOPICAL at 17:15

## 2024-01-11 RX ADMIN — ESTRADIOL 1 GRAM(S): 4 INSERT VAGINAL at 20:27

## 2024-01-11 NOTE — PROVIDER CONTACT NOTE (OTHER) - ASSESSMENT
Pt noted rapid Afib upto 120's non sustaining in tele monitoring. HR in between 100-120's. Not in any acute distress.

## 2024-01-11 NOTE — PROGRESS NOTE ADULT - SUBJECTIVE AND OBJECTIVE BOX
PROGRESS NOTE:     Patient is a 83y old  Female who presents with a chief complaint of afib with rvr, acute hypoxic respiratory failure 2/2 copd/asthma (10 Brayan 2024 16:14)      SUBJECTIVE / OVERNIGHT EVENTS: Requesting to be transferred to [facility]     ADDITIONAL REVIEW OF SYSTEMS:    MEDICATIONS  (STANDING):  albuterol/ipratropium for Nebulization 3 milliLiter(s) Nebulizer every 6 hours  atorvastatin 40 milliGRAM(s) Oral at bedtime  chlorhexidine 2% Cloths 1 Application(s) Topical daily  estradiol 0.01% Vaginal Cream 1 Gram(s) Vaginal <User Schedule>  levothyroxine 75 MICROGram(s) Oral daily  lisinopril 20 milliGRAM(s) Oral two times a day  metoprolol tartrate 50 milliGRAM(s) Oral two times a day  predniSONE   Tablet 40 milliGRAM(s) Oral daily  rivaroxaban 15 milliGRAM(s) Oral with dinner  sodium chloride 0.9%. 500 milliLiter(s) (50 mL/Hr) IV Continuous <Continuous>    MEDICATIONS  (PRN):  acetaminophen     Tablet .. 650 milliGRAM(s) Oral every 6 hours PRN Temp greater or equal to 38C (100.4F), Mild Pain (1 - 3)  aluminum hydroxide/magnesium hydroxide/simethicone Suspension 30 milliLiter(s) Oral every 4 hours PRN Dyspepsia  guaiFENesin Oral Liquid (Sugar-Free) 100 milliGRAM(s) Oral every 6 hours PRN Cough  melatonin 3 milliGRAM(s) Oral at bedtime PRN Insomnia  ondansetron Injectable 4 milliGRAM(s) IV Push every 8 hours PRN Nausea and/or Vomiting      CAPILLARY BLOOD GLUCOSE        I&O's Summary      PHYSICAL EXAM:  Vital Signs Last 24 Hrs  T(C): 37 (11 Jan 2024 17:10), Max: 37.5 (11 Jan 2024 04:27)  T(F): 98.6 (11 Jan 2024 17:10), Max: 99.5 (11 Jan 2024 04:27)  HR: 100 (11 Jan 2024 17:10) (79 - 154)  BP: 134/68 (11 Jan 2024 17:10) (134/68 - 198/113)  BP(mean): --  RR: 18 (11 Jan 2024 17:10) (18 - 20)  SpO2: 100% (11 Jan 2024 17:10) (94% - 100%)    Parameters below as of 11 Jan 2024 17:10  Patient On (Oxygen Delivery Method): room air        CONSTITUTIONAL: NAD, well-developed  RESPIRATORY: Normal respiratory effort; lungs are clear to auscultation bilaterally  CARDIOVASCULAR: Tachycardic, RR  ABDOMEN: Nontender to palpation, normoactive bowel sounds, no rebound/guarding; No hepatosplenomegaly  MUSCLOSKELETAL: no clubbing or cyanosis of digits; no joint swelling or tenderness to palpation    LABS:                    Culture - Blood (collected 09 Jan 2024 02:15)  Source: .Blood Blood-Venous  Preliminary Report (11 Jan 2024 09:01):    No growth at 48 Hours    Culture - Blood (collected 09 Jan 2024 02:00)  Source: .Blood Blood-Peripheral  Preliminary Report (11 Jan 2024 09:01):    No growth at 48 Hours    Culture - Urine (collected 09 Jan 2024 01:40)  Source: Clean Catch Clean Catch (Midstream)  Preliminary Report (11 Jan 2024 16:04):    10,000 - 49,000 CFU/mL Enterococcus faecalis    10,000 - 49,000 CFU/mL Pseudomonas aeruginosa        RADIOLOGY & ADDITIONAL TESTS:  Results Reviewed:   Imaging Personally Reviewed:  Electrocardiogram Personally Reviewed:    COORDINATION OF CARE:  Care Discussed with Consultants/Other Providers [Y/N]:  Prior or Outpatient Records Reviewed [Y/N]:   PROGRESS NOTE:     Patient is a 83y old  Female who presents with a chief complaint of afib with rvr, acute hypoxic respiratory failure 2/2 copd/asthma (10 Brayan 2024 16:14)      SUBJECTIVE / OVERNIGHT EVENTS: Requesting to be transferred to [facility]     ADDITIONAL REVIEW OF SYSTEMS:    MEDICATIONS  (STANDING):  albuterol/ipratropium for Nebulization 3 milliLiter(s) Nebulizer every 6 hours  atorvastatin 40 milliGRAM(s) Oral at bedtime  chlorhexidine 2% Cloths 1 Application(s) Topical daily  estradiol 0.01% Vaginal Cream 1 Gram(s) Vaginal <User Schedule>  levothyroxine 75 MICROGram(s) Oral daily  lisinopril 20 milliGRAM(s) Oral two times a day  metoprolol tartrate 50 milliGRAM(s) Oral two times a day  predniSONE   Tablet 40 milliGRAM(s) Oral daily  rivaroxaban 15 milliGRAM(s) Oral with dinner  sodium chloride 0.9%. 500 milliLiter(s) (50 mL/Hr) IV Continuous <Continuous>    MEDICATIONS  (PRN):  acetaminophen     Tablet .. 650 milliGRAM(s) Oral every 6 hours PRN Temp greater or equal to 38C (100.4F), Mild Pain (1 - 3)  aluminum hydroxide/magnesium hydroxide/simethicone Suspension 30 milliLiter(s) Oral every 4 hours PRN Dyspepsia  guaiFENesin Oral Liquid (Sugar-Free) 100 milliGRAM(s) Oral every 6 hours PRN Cough  melatonin 3 milliGRAM(s) Oral at bedtime PRN Insomnia  ondansetron Injectable 4 milliGRAM(s) IV Push every 8 hours PRN Nausea and/or Vomiting      CAPILLARY BLOOD GLUCOSE        I&O's Summary      PHYSICAL EXAM:  Vital Signs Last 24 Hrs  T(C): 37 (11 Jan 2024 17:10), Max: 37.5 (11 Jan 2024 04:27)  T(F): 98.6 (11 Jan 2024 17:10), Max: 99.5 (11 Jan 2024 04:27)  HR: 100 (11 Jan 2024 17:10) (79 - 154)  BP: 134/68 (11 Jan 2024 17:10) (134/68 - 198/113)  BP(mean): --  RR: 18 (11 Jan 2024 17:10) (18 - 20)  SpO2: 100% (11 Jan 2024 17:10) (94% - 100%)    Parameters below as of 11 Jan 2024 17:10  Patient On (Oxygen Delivery Method): room air        CONSTITUTIONAL: NAD, well-developed  RESPIRATORY: Normal respiratory effort; lungs are clear to auscultation bilaterally  CARDIOVASCULAR: Tachycardic, RR  ABDOMEN: Nontender to palpation, normoactive bowel sounds, no rebound/guarding; No hepatosplenomegaly  MUSCLOSKELETAL: no clubbing or cyanosis of digits; no joint swelling or tenderness to palpation    LABS:                    Culture - Blood (collected 09 Jan 2024 02:15)  Source: .Blood Blood-Venous  Preliminary Report (11 Jan 2024 09:01):    No growth at 48 Hours    Culture - Blood (collected 09 Jan 2024 02:00)  Source: .Blood Blood-Peripheral  Preliminary Report (11 Jan 2024 09:01):    No growth at 48 Hours    Culture - Urine (collected 09 Jan 2024 01:40)  Source: Clean Catch Clean Catch (Midstream)  Preliminary Report (11 Jan 2024 16:04):    10,000 - 49,000 CFU/mL Enterococcus faecalis    10,000 - 49,000 CFU/mL Pseudomonas aeruginosa        RADIOLOGY & ADDITIONAL TESTS:  Results Reviewed:   Imaging Personally Reviewed:  Electrocardiogram Personally Reviewed:    COORDINATION OF CARE:  Care Discussed with Consultants/Other Providers [Y/N]:  Prior or Outpatient Records Reviewed [Y/N]:

## 2024-01-11 NOTE — CHART NOTE - NSCHARTNOTEFT_GEN_A_CORE
Medicine PA Note    Called by Nurse for pt's HR sustaining in the 150s. She has a h/o afib w/rvr and took her metoprolol this morning. She is in Afib w/RVR but she has no complaints of palpitations and chest pain. Pt given IV metoprolol for now. Will monitor the HR. Pt is planned for an eventually DCCV at [facility].    Rommel Pandya PA-C  x [phone] Medicine PA Note    Called by Nurse for pt's HR sustaining in the 150s. She has a h/o afib w/rvr and took her metoprolol this morning. She is in Afib w/RVR but she has no complaints of palpitations and chest pain. Pt given IV metoprolol for now. Will monitor the HR. Pt is planned for an eventually DCCV at [facility].    Rommel Pandya PA-C  x [phone]

## 2024-01-11 NOTE — PROVIDER CONTACT NOTE (OTHER) - ACTION/TREATMENT ORDERED:
Provider made aware. Instructed to give metoprolol tartrate 50mg and lisinopril 20mg now. and recheck in 1 hr. Safety maintained.

## 2024-01-11 NOTE — PROVIDER CONTACT NOTE (OTHER) - ASSESSMENT
/86, despite the education and encouragement Patient refusing to repeat manual BP, Multiple attempts made. Not cooperative.

## 2024-01-11 NOTE — PROVIDER CONTACT NOTE (OTHER) - ASSESSMENT
Postop Day  1  s/p   C- Section    THERAPY:  [ X] Spinal morphine         Sedation Score:	  [ X] Alert	    [  ] Drowsy        [  ] Arousable	[  ] Asleep	[  ] Unresponsive    Side Effects:	  [ X] None	     [  ] Nausea        [  ] Pruritus        [  ] Weakness   [  ] Numbness        ASSESSMENT/ PLAN   Change to po prn pain meds 24 hours post Spinal Morphine.  [ x ]Documentation and Verification of current medications /102

## 2024-01-11 NOTE — PROGRESS NOTE ADULT - PROBLEM SELECTOR PLAN 1
[id number]  s/p diltiazem 60mg PO x1,  diltiazem 20mg IV x1  Continue home metoprolol tartrate 50mg BID  continue Xarelto   ECHO ordered  tele monitoring     *patient pending transfer to [facility], cardiologist accepted her, Dr. Guzman, awaiting bed [id number]  s/p diltiazem 60mg PO x1,  diltiazem 20mg IV x1  Continue home metoprolol tartrate 50mg BID  continue Xarelto   ECHO ordered  tele monitoring     *patient pending transfer to [facility], cardiologist accepted her, Dr. Guzman, awaiting bed

## 2024-01-12 LAB
-  AMIKACIN: SIGNIFICANT CHANGE UP
-  AMIKACIN: SIGNIFICANT CHANGE UP
-  AMPICILLIN: SIGNIFICANT CHANGE UP
-  AMPICILLIN: SIGNIFICANT CHANGE UP
-  AZTREONAM: SIGNIFICANT CHANGE UP
-  AZTREONAM: SIGNIFICANT CHANGE UP
-  CEFEPIME: SIGNIFICANT CHANGE UP
-  CEFEPIME: SIGNIFICANT CHANGE UP
-  CEFTAZIDIME: SIGNIFICANT CHANGE UP
-  CEFTAZIDIME: SIGNIFICANT CHANGE UP
-  CIPROFLOXACIN: SIGNIFICANT CHANGE UP
-  IMIPENEM: SIGNIFICANT CHANGE UP
-  IMIPENEM: SIGNIFICANT CHANGE UP
-  LEVOFLOXACIN: SIGNIFICANT CHANGE UP
-  MEROPENEM: SIGNIFICANT CHANGE UP
-  MEROPENEM: SIGNIFICANT CHANGE UP
-  NITROFURANTOIN: SIGNIFICANT CHANGE UP
-  NITROFURANTOIN: SIGNIFICANT CHANGE UP
-  PIPERACILLIN/TAZOBACTAM: SIGNIFICANT CHANGE UP
-  PIPERACILLIN/TAZOBACTAM: SIGNIFICANT CHANGE UP
-  TETRACYCLINE: SIGNIFICANT CHANGE UP
-  TETRACYCLINE: SIGNIFICANT CHANGE UP
-  VANCOMYCIN: SIGNIFICANT CHANGE UP
-  VANCOMYCIN: SIGNIFICANT CHANGE UP
ANION GAP SERPL CALC-SCNC: 16 MMOL/L — HIGH (ref 7–14)
ANION GAP SERPL CALC-SCNC: 16 MMOL/L — HIGH (ref 7–14)
BUN SERPL-MCNC: 19 MG/DL — SIGNIFICANT CHANGE UP (ref 7–23)
BUN SERPL-MCNC: 19 MG/DL — SIGNIFICANT CHANGE UP (ref 7–23)
CALCIUM SERPL-MCNC: 9.3 MG/DL — SIGNIFICANT CHANGE UP (ref 8.4–10.5)
CALCIUM SERPL-MCNC: 9.3 MG/DL — SIGNIFICANT CHANGE UP (ref 8.4–10.5)
CHLORIDE SERPL-SCNC: 100 MMOL/L — SIGNIFICANT CHANGE UP (ref 98–107)
CHLORIDE SERPL-SCNC: 100 MMOL/L — SIGNIFICANT CHANGE UP (ref 98–107)
CO2 SERPL-SCNC: 25 MMOL/L — SIGNIFICANT CHANGE UP (ref 22–31)
CO2 SERPL-SCNC: 25 MMOL/L — SIGNIFICANT CHANGE UP (ref 22–31)
CREAT SERPL-MCNC: 0.54 MG/DL — SIGNIFICANT CHANGE UP (ref 0.5–1.3)
CREAT SERPL-MCNC: 0.54 MG/DL — SIGNIFICANT CHANGE UP (ref 0.5–1.3)
CULTURE RESULTS: ABNORMAL
CULTURE RESULTS: ABNORMAL
EGFR: 91 ML/MIN/1.73M2 — SIGNIFICANT CHANGE UP
EGFR: 91 ML/MIN/1.73M2 — SIGNIFICANT CHANGE UP
GLUCOSE SERPL-MCNC: 124 MG/DL — HIGH (ref 70–99)
GLUCOSE SERPL-MCNC: 124 MG/DL — HIGH (ref 70–99)
MAGNESIUM SERPL-MCNC: 1.9 MG/DL — SIGNIFICANT CHANGE UP (ref 1.6–2.6)
MAGNESIUM SERPL-MCNC: 1.9 MG/DL — SIGNIFICANT CHANGE UP (ref 1.6–2.6)
METHOD TYPE: SIGNIFICANT CHANGE UP
ORGANISM # SPEC MICROSCOPIC CNT: ABNORMAL
PHOSPHATE SERPL-MCNC: 4 MG/DL — SIGNIFICANT CHANGE UP (ref 2.5–4.5)
PHOSPHATE SERPL-MCNC: 4 MG/DL — SIGNIFICANT CHANGE UP (ref 2.5–4.5)
POTASSIUM SERPL-MCNC: 3.8 MMOL/L — SIGNIFICANT CHANGE UP (ref 3.5–5.3)
POTASSIUM SERPL-MCNC: 3.8 MMOL/L — SIGNIFICANT CHANGE UP (ref 3.5–5.3)
POTASSIUM SERPL-SCNC: 3.8 MMOL/L — SIGNIFICANT CHANGE UP (ref 3.5–5.3)
POTASSIUM SERPL-SCNC: 3.8 MMOL/L — SIGNIFICANT CHANGE UP (ref 3.5–5.3)
SODIUM SERPL-SCNC: 141 MMOL/L — SIGNIFICANT CHANGE UP (ref 135–145)
SODIUM SERPL-SCNC: 141 MMOL/L — SIGNIFICANT CHANGE UP (ref 135–145)
SPECIMEN SOURCE: SIGNIFICANT CHANGE UP
SPECIMEN SOURCE: SIGNIFICANT CHANGE UP
T4 FREE SERPL-MCNC: 1.8 NG/DL — SIGNIFICANT CHANGE UP (ref 0.9–1.8)
T4 FREE SERPL-MCNC: 1.8 NG/DL — SIGNIFICANT CHANGE UP (ref 0.9–1.8)
TSH SERPL-MCNC: 1.39 UIU/ML — SIGNIFICANT CHANGE UP (ref 0.27–4.2)
TSH SERPL-MCNC: 1.39 UIU/ML — SIGNIFICANT CHANGE UP (ref 0.27–4.2)

## 2024-01-12 PROCEDURE — 99232 SBSQ HOSP IP/OBS MODERATE 35: CPT

## 2024-01-12 RX ORDER — METOPROLOL TARTRATE 50 MG
50 TABLET ORAL ONCE
Refills: 0 | Status: COMPLETED | OUTPATIENT
Start: 2024-01-12 | End: 2024-01-12

## 2024-01-12 RX ORDER — BUDESONIDE AND FORMOTEROL FUMARATE DIHYDRATE 160; 4.5 UG/1; UG/1
2 AEROSOL RESPIRATORY (INHALATION)
Refills: 0 | Status: DISCONTINUED | OUTPATIENT
Start: 2024-01-12 | End: 2024-01-14

## 2024-01-12 RX ORDER — METOPROLOL TARTRATE 50 MG
5 TABLET ORAL ONCE
Refills: 0 | Status: COMPLETED | OUTPATIENT
Start: 2024-01-12 | End: 2024-01-12

## 2024-01-12 RX ORDER — MAGNESIUM SULFATE 500 MG/ML
2 VIAL (ML) INJECTION ONCE
Refills: 0 | Status: COMPLETED | OUTPATIENT
Start: 2024-01-12 | End: 2024-01-12

## 2024-01-12 RX ORDER — IPRATROPIUM/ALBUTEROL SULFATE 18-103MCG
3 AEROSOL WITH ADAPTER (GRAM) INHALATION EVERY 6 HOURS
Refills: 0 | Status: DISCONTINUED | OUTPATIENT
Start: 2024-01-12 | End: 2024-01-14

## 2024-01-12 RX ORDER — SODIUM CHLORIDE 9 MG/ML
1000 INJECTION, SOLUTION INTRAVENOUS ONCE
Refills: 0 | Status: COMPLETED | OUTPATIENT
Start: 2024-01-12 | End: 2024-01-12

## 2024-01-12 RX ORDER — METOPROLOL TARTRATE 50 MG
100 TABLET ORAL
Refills: 0 | Status: DISCONTINUED | OUTPATIENT
Start: 2024-01-12 | End: 2024-01-14

## 2024-01-12 RX ORDER — POTASSIUM CHLORIDE 20 MEQ
20 PACKET (EA) ORAL ONCE
Refills: 0 | Status: COMPLETED | OUTPATIENT
Start: 2024-01-12 | End: 2024-01-12

## 2024-01-12 RX ADMIN — RIVAROXABAN 15 MILLIGRAM(S): KIT at 21:59

## 2024-01-12 RX ADMIN — BUDESONIDE AND FORMOTEROL FUMARATE DIHYDRATE 2 PUFF(S): 160; 4.5 AEROSOL RESPIRATORY (INHALATION) at 09:37

## 2024-01-12 RX ADMIN — BUDESONIDE AND FORMOTEROL FUMARATE DIHYDRATE 2 PUFF(S): 160; 4.5 AEROSOL RESPIRATORY (INHALATION) at 21:56

## 2024-01-12 RX ADMIN — SODIUM CHLORIDE 1000 MILLILITER(S): 9 INJECTION, SOLUTION INTRAVENOUS at 17:19

## 2024-01-12 RX ADMIN — Medication 75 MICROGRAM(S): at 05:29

## 2024-01-12 RX ADMIN — Medication 5 MILLIGRAM(S): at 15:31

## 2024-01-12 RX ADMIN — Medication 20 MILLIEQUIVALENT(S): at 17:26

## 2024-01-12 RX ADMIN — ESTRADIOL 1 GRAM(S): 4 INSERT VAGINAL at 09:19

## 2024-01-12 RX ADMIN — Medication 50 MILLIGRAM(S): at 15:32

## 2024-01-12 RX ADMIN — ESTRADIOL 1 GRAM(S): 4 INSERT VAGINAL at 21:55

## 2024-01-12 RX ADMIN — Medication 50 MILLIGRAM(S): at 05:30

## 2024-01-12 RX ADMIN — CHLORHEXIDINE GLUCONATE 1 APPLICATION(S): 213 SOLUTION TOPICAL at 13:03

## 2024-01-12 RX ADMIN — LISINOPRIL 20 MILLIGRAM(S): 2.5 TABLET ORAL at 21:57

## 2024-01-12 RX ADMIN — Medication 100 MILLIGRAM(S): at 22:00

## 2024-01-12 RX ADMIN — Medication 40 MILLIGRAM(S): at 05:28

## 2024-01-12 RX ADMIN — LISINOPRIL 20 MILLIGRAM(S): 2.5 TABLET ORAL at 05:28

## 2024-01-12 RX ADMIN — Medication 25 GRAM(S): at 17:26

## 2024-01-12 NOTE — PROGRESS NOTE ADULT - PROBLEM SELECTOR PLAN 1
[id number]  s/p diltiazem 60mg PO x1,  diltiazem 20mg IV x1  Continue home metoprolol tartrate 50mg BID, uptitrate as tolerated  continue Xarelto   ECHO ordered  tele monitoring     *patient pending transfer to [facility], cardiologist accepted her, Dr. Guzman, awaiting bed [id number]  s/p diltiazem 60mg PO x1,  diltiazem 20mg IV x1  Continue home metoprolol tartrate 50mg BID, uptitrate as tolerated  continue Xarelto   ECHO ordered  tele monitoring     *patient pending transfer to [facility], cardiologist accepted her, Dr. Guzman, awaiting bed

## 2024-01-12 NOTE — PROGRESS NOTE ADULT - SUBJECTIVE AND OBJECTIVE BOX
PROGRESS NOTE:     Patient is a 83y old  Female who presents with a chief complaint of afib with rvr, acute hypoxic respiratory failure 2/2 copd/asthma (11 Jan 2024 18:28)      SUBJECTIVE / OVERNIGHT EVENTS: awaiting transfer to [facility]    ADDITIONAL REVIEW OF SYSTEMS:    MEDICATIONS  (STANDING):  atorvastatin 40 milliGRAM(s) Oral at bedtime  budesonide 160 MICROgram(s)/formoterol 4.5 MICROgram(s) Inhaler 2 Puff(s) Inhalation two times a day  chlorhexidine 2% Cloths 1 Application(s) Topical daily  estradiol 0.01% Vaginal Cream 1 Gram(s) Vaginal <User Schedule>  levothyroxine 75 MICROGram(s) Oral daily  lisinopril 20 milliGRAM(s) Oral two times a day  metoprolol tartrate 100 milliGRAM(s) Oral two times a day  rivaroxaban 15 milliGRAM(s) Oral with dinner    MEDICATIONS  (PRN):  acetaminophen     Tablet .. 650 milliGRAM(s) Oral every 6 hours PRN Temp greater or equal to 38C (100.4F), Mild Pain (1 - 3)  albuterol/ipratropium for Nebulization 3 milliLiter(s) Nebulizer every 6 hours PRN Shortness of Breath and/or Wheezing  aluminum hydroxide/magnesium hydroxide/simethicone Suspension 30 milliLiter(s) Oral every 4 hours PRN Dyspepsia  guaiFENesin Oral Liquid (Sugar-Free) 100 milliGRAM(s) Oral every 6 hours PRN Cough  melatonin 3 milliGRAM(s) Oral at bedtime PRN Insomnia  ondansetron Injectable 4 milliGRAM(s) IV Push every 8 hours PRN Nausea and/or Vomiting      CAPILLARY BLOOD GLUCOSE        I&O's Summary      PHYSICAL EXAM:  Vital Signs Last 24 Hrs  T(C): 36.6 (12 Jan 2024 17:11), Max: 36.9 (12 Jan 2024 00:45)  T(F): 97.9 (12 Jan 2024 17:11), Max: 98.5 (12 Jan 2024 00:45)  HR: 78 (12 Jan 2024 17:11) (78 - 152)  BP: 106/75 (12 Jan 2024 17:11) (106/75 - 159/97)  BP(mean): --  RR: 18 (12 Jan 2024 17:11) (16 - 18)  SpO2: 99% (12 Jan 2024 17:11) (98% - 100%)    Parameters below as of 12 Jan 2024 17:11  Patient On (Oxygen Delivery Method): room air        CONSTITUTIONAL: NAD, well-developed  RESPIRATORY: Normal respiratory effort; lungs are clear to auscultation bilaterally  CARDIOVASCULAR: Irregularly irregular rate   ABDOMEN: Nontender to palpation, normoactive bowel sounds, no rebound/guarding; No hepatosplenomegaly  MUSCLOSKELETAL: no clubbing or cyanosis of digits; no joint swelling or tenderness to palpation  PSYCH: A+O to person, place, and time; affect appropriate    LABS:    01-12    141  |  100  |  19  ----------------------------<  124<H>  3.8   |  25  |  0.54    Ca    9.3      12 Jan 2024 15:40  Phos  4.0     01-12  Mg     1.90     01-12            Urinalysis Basic - ( 12 Jan 2024 15:40 )    Color: x / Appearance: x / SG: x / pH: x  Gluc: 124 mg/dL / Ketone: x  / Bili: x / Urobili: x   Blood: x / Protein: x / Nitrite: x   Leuk Esterase: x / RBC: x / WBC x   Sq Epi: x / Non Sq Epi: x / Bacteria: x          RADIOLOGY & ADDITIONAL TESTS:  Results Reviewed:   Imaging Personally Reviewed:  Electrocardiogram Personally Reviewed:    COORDINATION OF CARE:  Care Discussed with Consultants/Other Providers [Y/N]:  Prior or Outpatient Records Reviewed [Y/N]:   PROGRESS NOTE:     Patient is a 83y old  Female who presents with a chief complaint of afib with rvr, acute hypoxic respiratory failure 2/2 copd/asthma (11 Jan 2024 18:28)      SUBJECTIVE / OVERNIGHT EVENTS: awaiting transfer to [facility]    ADDITIONAL REVIEW OF SYSTEMS:    MEDICATIONS  (STANDING):  atorvastatin 40 milliGRAM(s) Oral at bedtime  budesonide 160 MICROgram(s)/formoterol 4.5 MICROgram(s) Inhaler 2 Puff(s) Inhalation two times a day  chlorhexidine 2% Cloths 1 Application(s) Topical daily  estradiol 0.01% Vaginal Cream 1 Gram(s) Vaginal <User Schedule>  levothyroxine 75 MICROGram(s) Oral daily  lisinopril 20 milliGRAM(s) Oral two times a day  metoprolol tartrate 100 milliGRAM(s) Oral two times a day  rivaroxaban 15 milliGRAM(s) Oral with dinner    MEDICATIONS  (PRN):  acetaminophen     Tablet .. 650 milliGRAM(s) Oral every 6 hours PRN Temp greater or equal to 38C (100.4F), Mild Pain (1 - 3)  albuterol/ipratropium for Nebulization 3 milliLiter(s) Nebulizer every 6 hours PRN Shortness of Breath and/or Wheezing  aluminum hydroxide/magnesium hydroxide/simethicone Suspension 30 milliLiter(s) Oral every 4 hours PRN Dyspepsia  guaiFENesin Oral Liquid (Sugar-Free) 100 milliGRAM(s) Oral every 6 hours PRN Cough  melatonin 3 milliGRAM(s) Oral at bedtime PRN Insomnia  ondansetron Injectable 4 milliGRAM(s) IV Push every 8 hours PRN Nausea and/or Vomiting      CAPILLARY BLOOD GLUCOSE        I&O's Summary      PHYSICAL EXAM:  Vital Signs Last 24 Hrs  T(C): 36.6 (12 Jan 2024 17:11), Max: 36.9 (12 Jan 2024 00:45)  T(F): 97.9 (12 Jan 2024 17:11), Max: 98.5 (12 Jan 2024 00:45)  HR: 78 (12 Jan 2024 17:11) (78 - 152)  BP: 106/75 (12 Jan 2024 17:11) (106/75 - 159/97)  BP(mean): --  RR: 18 (12 Jan 2024 17:11) (16 - 18)  SpO2: 99% (12 Jan 2024 17:11) (98% - 100%)    Parameters below as of 12 Jan 2024 17:11  Patient On (Oxygen Delivery Method): room air        CONSTITUTIONAL: NAD, well-developed  RESPIRATORY: Normal respiratory effort; lungs are clear to auscultation bilaterally  CARDIOVASCULAR: Irregularly irregular rate   ABDOMEN: Nontender to palpation, normoactive bowel sounds, no rebound/guarding; No hepatosplenomegaly  MUSCLOSKELETAL: no clubbing or cyanosis of digits; no joint swelling or tenderness to palpation  PSYCH: A+O to person, place, and time; affect appropriate    LABS:    01-12    141  |  100  |  19  ----------------------------<  124<H>  3.8   |  25  |  0.54    Ca    9.3      12 Jan 2024 15:40  Phos  4.0     01-12  Mg     1.90     01-12            Urinalysis Basic - ( 12 Jan 2024 15:40 )    Color: x / Appearance: x / SG: x / pH: x  Gluc: 124 mg/dL / Ketone: x  / Bili: x / Urobili: x   Blood: x / Protein: x / Nitrite: x   Leuk Esterase: x / RBC: x / WBC x   Sq Epi: x / Non Sq Epi: x / Bacteria: x          RADIOLOGY & ADDITIONAL TESTS:  Results Reviewed:   Imaging Personally Reviewed:  Electrocardiogram Personally Reviewed:    COORDINATION OF CARE:  Care Discussed with Consultants/Other Providers [Y/N]:  Prior or Outpatient Records Reviewed [Y/N]:

## 2024-01-12 NOTE — CHART NOTE - NSCHARTNOTEFT_GEN_A_CORE
MEDICINE PA NOTE     Called by 7S RN again for patient with persistent tachycardia. Seen by bedside on transfer to  with HR 1teens to 120s with intermittent 130s. Discussed with patient and noted with poor PO diet. Tenting of skin of hands. 1L LR bolus given and Mag 2G given. Will monitor HR. Pending transfer to [facility] for ablation.     SELVIN Murcia, PA-C  Department of Medicine/ RCU  In house RCU [place] [id number]  In house Medicine Beeper [id number]  Reachable via teams MEDICINE PA NOTE     Called by 7S RN again for patient with persistent tachycardia. Seen by bedside on transfer to  with HR 1teens to 120s with intermittent 130s. Discussed with patient and noted with poor PO diet. Tenting of skin of hands. 1L LR bolus given and Mag 2G given. Will monitor HR. Pending transfer to [facility] for ablation.     SELVIN Murcia, PA-C  Department of Medicine/ RCU  In house RCU [place] [phone]  In house Medicine Beeper [phone]  Reachable via teams

## 2024-01-12 NOTE — CHART NOTE - NSCHARTNOTEFT_GEN_A_CORE
MEDICINE PA NOTE     Called by RN for patient with AFIB RVR to 150 on monitor. Seen by bedside and asymptomatic with no complaints. No fever spike and eating well with little to no concern for dehydration. Lopressor 5mg IVP x 1 dose given and Lopressor increased to 100 BID. Refused AM labs this morning and discussed BMP and TFT of which patient is amendable for. Patient pending transfer to [facility] under the care of her Cardiologist for possible ablation when bed available.     SELVIN Murcia, PA-C  Department of Medicine/ RCU  In house RCU [place] [id number]  In house Medicine Beeper [phone]  Reachable via teams MEDICINE PA NOTE     Called by RN for patient with AFIB RVR to 150 on monitor. Seen by bedside and asymptomatic with no complaints. No fever spike and eating well with little to no concern for dehydration. Lopressor 5mg IVP x 1 dose given and Lopressor increased to 100 BID. Refused AM labs this morning and discussed BMP and TFT of which patient is amendable for. Patient pending transfer to [facility] under the care of her Cardiologist for possible ablation when bed available.     SELVIN Murcia, PA-C  Department of Medicine/ RCU  In house RCU [place] [id number]  In house Medicine Beeper [phone]  Reachable via teams MEDICINE PA NOTE     Called by RN for patient with AFIB RVR to 150-160 on monitor. Seen by bedside and asymptomatic with no complaints. No fever spike and eating well with little to no concern for dehydration. Lopressor 5mg IVP x 1 dose given and Lopressor increased to 100 BID with improvement to 1-teens to 120s. Refused AM labs this morning and discussed BMP and TFT of which patient is amendable for. Patient pending transfer to [facility] under the care of her Cardiologist for possible ablation when bed available.     SELVIN Murcia, PA-C  Department of Medicine/ RCU  In house RCU [place] [id number]  In house Medicine Beeper [phone]  Reachable via teams MEDICINE PA NOTE     Called by RN for patient with AFIB RVR to 150-160 on monitor. Seen by bedside and asymptomatic with no complaints. No fever spike and eating well with little to no concern for dehydration. Lopressor 5mg IVP x 1 dose given and Lopressor increased to 100 BID with improvement to 1-teens to 120s. Refused AM labs this morning and discussed BMP and TFT of which patient is amendable for. Patient pending transfer to [facility] under the care of her Cardiologist for possible ablation when bed available.     SELVIN Murcia, PA-C  Department of Medicine/ RCU  In house RCU [place] [id number]  In house Medicine Beeper [phone]  Reachable via teams

## 2024-01-13 LAB
ANION GAP SERPL CALC-SCNC: 13 MMOL/L — SIGNIFICANT CHANGE UP (ref 7–14)
ANION GAP SERPL CALC-SCNC: 13 MMOL/L — SIGNIFICANT CHANGE UP (ref 7–14)
BUN SERPL-MCNC: 16 MG/DL — SIGNIFICANT CHANGE UP (ref 7–23)
BUN SERPL-MCNC: 16 MG/DL — SIGNIFICANT CHANGE UP (ref 7–23)
CALCIUM SERPL-MCNC: 9.1 MG/DL — SIGNIFICANT CHANGE UP (ref 8.4–10.5)
CALCIUM SERPL-MCNC: 9.1 MG/DL — SIGNIFICANT CHANGE UP (ref 8.4–10.5)
CHLORIDE SERPL-SCNC: 103 MMOL/L — SIGNIFICANT CHANGE UP (ref 98–107)
CHLORIDE SERPL-SCNC: 103 MMOL/L — SIGNIFICANT CHANGE UP (ref 98–107)
CO2 SERPL-SCNC: 24 MMOL/L — SIGNIFICANT CHANGE UP (ref 22–31)
CO2 SERPL-SCNC: 24 MMOL/L — SIGNIFICANT CHANGE UP (ref 22–31)
CREAT SERPL-MCNC: 0.55 MG/DL — SIGNIFICANT CHANGE UP (ref 0.5–1.3)
CREAT SERPL-MCNC: 0.55 MG/DL — SIGNIFICANT CHANGE UP (ref 0.5–1.3)
EGFR: 91 ML/MIN/1.73M2 — SIGNIFICANT CHANGE UP
EGFR: 91 ML/MIN/1.73M2 — SIGNIFICANT CHANGE UP
GLUCOSE SERPL-MCNC: 96 MG/DL — SIGNIFICANT CHANGE UP (ref 70–99)
GLUCOSE SERPL-MCNC: 96 MG/DL — SIGNIFICANT CHANGE UP (ref 70–99)
MAGNESIUM SERPL-MCNC: 2.1 MG/DL — SIGNIFICANT CHANGE UP (ref 1.6–2.6)
MAGNESIUM SERPL-MCNC: 2.1 MG/DL — SIGNIFICANT CHANGE UP (ref 1.6–2.6)
PHOSPHATE SERPL-MCNC: 3 MG/DL — SIGNIFICANT CHANGE UP (ref 2.5–4.5)
PHOSPHATE SERPL-MCNC: 3 MG/DL — SIGNIFICANT CHANGE UP (ref 2.5–4.5)
POTASSIUM SERPL-MCNC: 3.7 MMOL/L — SIGNIFICANT CHANGE UP (ref 3.5–5.3)
POTASSIUM SERPL-MCNC: 3.7 MMOL/L — SIGNIFICANT CHANGE UP (ref 3.5–5.3)
POTASSIUM SERPL-SCNC: 3.7 MMOL/L — SIGNIFICANT CHANGE UP (ref 3.5–5.3)
POTASSIUM SERPL-SCNC: 3.7 MMOL/L — SIGNIFICANT CHANGE UP (ref 3.5–5.3)
SODIUM SERPL-SCNC: 140 MMOL/L — SIGNIFICANT CHANGE UP (ref 135–145)
SODIUM SERPL-SCNC: 140 MMOL/L — SIGNIFICANT CHANGE UP (ref 135–145)

## 2024-01-13 PROCEDURE — 99232 SBSQ HOSP IP/OBS MODERATE 35: CPT

## 2024-01-13 RX ORDER — CALAMINE AND ZINC OXIDE AND PHENOL 160; 10 MG/ML; MG/ML
1 LOTION TOPICAL ONCE
Refills: 0 | Status: COMPLETED | OUTPATIENT
Start: 2024-01-13 | End: 2024-01-13

## 2024-01-13 RX ORDER — CALAMINE AND ZINC OXIDE AND PHENOL 160; 10 MG/ML; MG/ML
1 LOTION TOPICAL ONCE
Refills: 0 | Status: DISCONTINUED | OUTPATIENT
Start: 2024-01-13 | End: 2024-01-14

## 2024-01-13 RX ORDER — POTASSIUM CHLORIDE 20 MEQ
40 PACKET (EA) ORAL ONCE
Refills: 0 | Status: COMPLETED | OUTPATIENT
Start: 2024-01-13 | End: 2024-01-13

## 2024-01-13 RX ORDER — LORATADINE 10 MG/1
10 TABLET ORAL ONCE
Refills: 0 | Status: COMPLETED | OUTPATIENT
Start: 2024-01-13 | End: 2024-01-13

## 2024-01-13 RX ORDER — DIPHENHYDRAMINE HCL 50 MG
25 CAPSULE ORAL ONCE
Refills: 0 | Status: COMPLETED | OUTPATIENT
Start: 2024-01-13 | End: 2024-01-13

## 2024-01-13 RX ADMIN — LORATADINE 10 MILLIGRAM(S): 10 TABLET ORAL at 10:56

## 2024-01-13 RX ADMIN — Medication 40 MILLIEQUIVALENT(S): at 10:57

## 2024-01-13 RX ADMIN — LISINOPRIL 20 MILLIGRAM(S): 2.5 TABLET ORAL at 10:56

## 2024-01-13 RX ADMIN — BUDESONIDE AND FORMOTEROL FUMARATE DIHYDRATE 2 PUFF(S): 160; 4.5 AEROSOL RESPIRATORY (INHALATION) at 09:04

## 2024-01-13 RX ADMIN — Medication 75 MICROGRAM(S): at 05:06

## 2024-01-13 RX ADMIN — ESTRADIOL 1 GRAM(S): 4 INSERT VAGINAL at 08:39

## 2024-01-13 RX ADMIN — CHLORHEXIDINE GLUCONATE 1 APPLICATION(S): 213 SOLUTION TOPICAL at 11:00

## 2024-01-13 RX ADMIN — Medication 25 MILLIGRAM(S): at 12:54

## 2024-01-13 RX ADMIN — LISINOPRIL 20 MILLIGRAM(S): 2.5 TABLET ORAL at 21:24

## 2024-01-13 RX ADMIN — CALAMINE AND ZINC OXIDE AND PHENOL 1 APPLICATION(S): 160; 10 LOTION TOPICAL at 05:40

## 2024-01-13 RX ADMIN — RIVAROXABAN 15 MILLIGRAM(S): KIT at 17:34

## 2024-01-13 RX ADMIN — Medication 100 MILLIGRAM(S): at 10:56

## 2024-01-13 RX ADMIN — Medication 100 MILLIGRAM(S): at 21:24

## 2024-01-13 RX ADMIN — BUDESONIDE AND FORMOTEROL FUMARATE DIHYDRATE 2 PUFF(S): 160; 4.5 AEROSOL RESPIRATORY (INHALATION) at 21:24

## 2024-01-13 RX ADMIN — ESTRADIOL 1 GRAM(S): 4 INSERT VAGINAL at 23:24

## 2024-01-13 NOTE — PROGRESS NOTE ADULT - SUBJECTIVE AND OBJECTIVE BOX
PROGRESS NOTE:     Patient is a 83y old  Female who presents with a chief complaint of afib with rvr, acute hypoxic respiratory failure 2/2 copd/asthma (13 Jan 2024 12:42)      SUBJECTIVE / OVERNIGHT EVENTS: ready to be discharged    ADDITIONAL REVIEW OF SYSTEMS:    MEDICATIONS  (STANDING):  atorvastatin 40 milliGRAM(s) Oral at bedtime  budesonide 160 MICROgram(s)/formoterol 4.5 MICROgram(s) Inhaler 2 Puff(s) Inhalation two times a day  chlorhexidine 2% Cloths 1 Application(s) Topical daily  estradiol 0.01% Vaginal Cream 1 Gram(s) Vaginal <User Schedule>  levothyroxine 75 MICROGram(s) Oral daily  lisinopril 20 milliGRAM(s) Oral two times a day  metoprolol tartrate 100 milliGRAM(s) Oral two times a day  rivaroxaban 15 milliGRAM(s) Oral with dinner    MEDICATIONS  (PRN):  acetaminophen     Tablet .. 650 milliGRAM(s) Oral every 6 hours PRN Temp greater or equal to 38C (100.4F), Mild Pain (1 - 3)  albuterol/ipratropium for Nebulization 3 milliLiter(s) Nebulizer every 6 hours PRN Shortness of Breath and/or Wheezing  aluminum hydroxide/magnesium hydroxide/simethicone Suspension 30 milliLiter(s) Oral every 4 hours PRN Dyspepsia  guaiFENesin Oral Liquid (Sugar-Free) 100 milliGRAM(s) Oral every 6 hours PRN Cough  melatonin 3 milliGRAM(s) Oral at bedtime PRN Insomnia  ondansetron Injectable 4 milliGRAM(s) IV Push every 8 hours PRN Nausea and/or Vomiting      CAPILLARY BLOOD GLUCOSE        I&O's Summary      PHYSICAL EXAM:  Vital Signs Last 24 Hrs  T(C): 36.6 (13 Jan 2024 10:55), Max: 36.8 (12 Jan 2024 15:49)  T(F): 97.9 (13 Jan 2024 10:55), Max: 98.3 (12 Jan 2024 21:11)  HR: 73 (13 Jan 2024 10:55) (67 - 152)  BP: 139/86 (13 Jan 2024 10:55) (106/75 - 159/98)  BP(mean): --  RR: 17 (13 Jan 2024 10:55) (16 - 18)  SpO2: 98% (13 Jan 2024 10:55) (98% - 100%)    Parameters below as of 13 Jan 2024 10:55  Patient On (Oxygen Delivery Method): room air        CONSTITUTIONAL: NAD, well-developed  RESPIRATORY: Normal respiratory effort; lungs are clear to auscultation bilaterally  CARDIOVASCULAR: IRregularly Irregular, tachycardic  ABDOMEN: Nontender to palpation, normoactive bowel sounds, no rebound/guarding; No hepatosplenomegaly  MUSCLOSKELETAL: no clubbing or cyanosis of digits; no joint swelling or tenderness to palpation  PSYCH: A+O to person, place, and time; affect appropriate    LABS:    01-13    140  |  103  |  16  ----------------------------<  96  3.7   |  24  |  0.55    Ca    9.1      13 Jan 2024 05:50  Phos  3.0     01-13  Mg     2.10     01-13            Urinalysis Basic - ( 13 Jan 2024 05:50 )    Color: x / Appearance: x / SG: x / pH: x  Gluc: 96 mg/dL / Ketone: x  / Bili: x / Urobili: x   Blood: x / Protein: x / Nitrite: x   Leuk Esterase: x / RBC: x / WBC x   Sq Epi: x / Non Sq Epi: x / Bacteria: x          RADIOLOGY & ADDITIONAL TESTS:  Results Reviewed:   Imaging Personally Reviewed:  Electrocardiogram Personally Reviewed:    COORDINATION OF CARE:  Care Discussed with Consultants/Other Providers [Y/N]:  Prior or Outpatient Records Reviewed [Y/N]:

## 2024-01-13 NOTE — PROGRESS NOTE ADULT - PROBLEM SELECTOR PLAN 1
[id number]  s/p diltiazem 60mg PO x1,  diltiazem 20mg IV x1  -continue Lopressor 100mg bid  continue Xarelto   ECHO ordered  tele monitoring     *patient pending transfer to [facility], cardiologist accepted her, Dr. Guzman, awaiting bed  -c/s EP  -if remains rate controlled < 100 for 24 hours and asymptomatic can be discharged to home with o/p follow up [id number]  s/p diltiazem 60mg PO x1,  diltiazem 20mg IV x1  -continue Lopressor 100mg bid  continue Xarelto   ECHO ordered  tele monitoring     *patient pending transfer to [facility], cardiologist accepted her, Dr. Guzman, awaiting bed  -c/s EP  -if remains rate controlled < 100 for 24 hours and asymptomatic can be discharged to home with o/p follow up

## 2024-01-13 NOTE — CONSULT NOTE ADULT - ASSESSMENT
83F with HTN, CAD s/p PCI, COPD, afib on xarelto presenting with iron infusion reaction. Patient with persistent afib w/ recent failed DCCV, already scheduled for ablation. Currently rate controlled, asymptomatic.     Recs:  -c/w xarelto, metop 100 BID  -no plan for DCCV from EP standpoint  -patient should f/u with EP at [place] for elective afib ablation    Note not final unless signed by attending  83F with HTN, CAD s/p PCI, COPD, afib on xarelto presenting with iron infusion reaction. Patient with persistent afib w/ recent failed DCCV, already scheduled for ablation. Currently rate controlled, asymptomatic.     Recs:  -c/w xarelto, metop 100 BID  -no plan for DCCV from EP standpoint  -patient should f/u with EP at [place] for elective afib ablation    Note not final unless signed by attending

## 2024-01-13 NOTE — CONSULT NOTE ADULT - SUBJECTIVE AND OBJECTIVE BOX
Emilio Munguia MD  Cardiology Fellow  [phone]  All Cardiology service information can be found 24/7 on [email or web address], password: [email or web address]    Patient seen and evaluated at bedside    HPI:   83yr old female with a pmh of HTN, Dyslipidemia, CAD s/p PCI, COPD, Hashimoto's Thyroiditis, Recurrent UTIs, Spinal Stenosis s/p Laminectomy, GEORGIE, TIAs, paroxysmal afib on xarelto who presents with concern for an allergic reaction while receiving an iron infusion. In regards to afib, she was hospitalized in December for afib, had an unsuccessful DCCV, was scheduled for an outpatient ablation at [place].     Initially had afib with RVR, rates now improved to 80s with metop 100 BID.     Cardiac Meds: xarelto, metop 100 BID    PMHx:   Spinal stenosis    Hashimoto's thyroiditis    Cortes syndrome    Interstitial cystitis    Osteopenia    Torn meniscus    Hypertension    Acute UTI    Acute bronchitis    Hypertension    Hypothyroidism    Vaginal atrophy    Paroxysmal atrial fibrillation        PSHx:   H/O laminectomy    Meniscus tear        Allergies:  morphine (Blisters)  codeine (Nausea)      Current Medications:   acetaminophen     Tablet .. 650 milliGRAM(s) Oral every 6 hours PRN  albuterol/ipratropium for Nebulization 3 milliLiter(s) Nebulizer every 6 hours PRN  aluminum hydroxide/magnesium hydroxide/simethicone Suspension 30 milliLiter(s) Oral every 4 hours PRN  atorvastatin 40 milliGRAM(s) Oral at bedtime  budesonide 160 MICROgram(s)/formoterol 4.5 MICROgram(s) Inhaler 2 Puff(s) Inhalation two times a day  chlorhexidine 2% Cloths 1 Application(s) Topical daily  diphenhydrAMINE 25 milliGRAM(s) Oral once  estradiol 0.01% Vaginal Cream 1 Gram(s) Vaginal <User Schedule>  guaiFENesin Oral Liquid (Sugar-Free) 100 milliGRAM(s) Oral every 6 hours PRN  levothyroxine 75 MICROGram(s) Oral daily  lisinopril 20 milliGRAM(s) Oral two times a day  melatonin 3 milliGRAM(s) Oral at bedtime PRN  metoprolol tartrate 100 milliGRAM(s) Oral two times a day  ondansetron Injectable 4 milliGRAM(s) IV Push every 8 hours PRN  rivaroxaban 15 milliGRAM(s) Oral with dinner      FAMILY HISTORY:  FHx: lung cancer (Father)          REVIEW OF SYSTEMS:  CONSTITUTIONAL: No weakness, fevers or chills  EYES/ENT: No visual changes;  No dysphagia  NECK: No pain or stiffness  RESPIRATORY: No cough, wheezing, hemoptysis; No shortness of breath  CARDIOVASCULAR: No chest pain or palpitations; No lower extremity edema  GASTROINTESTINAL: No abdominal or epigastric pain. No nausea, vomiting, or hematemesis; No diarrhea or constipation. No melena or hematochezia.  BACK: No back pain  GENITOURINARY: No dysuria, frequency or hematuria  NEUROLOGICAL: No numbness or weakness  SKIN: No itching, burning, rashes, or lesions   All other review of systems is negative unless indicated above.    Physical Exam:  T(F): 97.9 (01-13), Max: 98.3 (01-12)  HR: 73 (01-13) (67 - 152)  BP: 139/86 (01-13) (106/75 - 159/98)  RR: 17 (01-13)  SpO2: 98% (01-13)  GEN: NAD  HEENT: EOMI, clear sclera  PULM: CTA b/l, no wheeze  CV: irreg  ABD: S, NT, ND  EXT: WWP, no edema  PSYCH: normal affect  SKIN: No rash    CXR: Personally reviewed    Labs: Personally reviewed    01-13    140  |  103  |  16  ----------------------------<  96  3.7   |  24  |  0.55    Ca    9.1      13 Jan 2024 05:50  Phos  3.0     01-13  Mg     2.10     01-13          CARDIAC MARKERS ( 08 Jan 2024 21:22 )  24 ng/L / x     / x     / x     / x     / x      CARDIAC MARKERS ( 08 Jan 2024 17:40 )  19 ng/L / x     / x     / x     / x     / x                  Thyroid Stimulating Hormone, Serum: 1.39 uIU/mL (01-12 @ 15:40)     Emilio Munguia MD  Cardiology Fellow  [phone]  All Cardiology service information can be found 24/7 on [email or web address], password: [email or web address]    Patient seen and evaluated at bedside    HPI:   83yr old female with a pmh of HTN, Dyslipidemia, CAD s/p PCI, COPD, Hashimoto's Thyroiditis, Recurrent UTIs, Spinal Stenosis s/p Laminectomy, GEORGIE, TIAs, paroxysmal afib on xarelto who presents with concern for an allergic reaction while receiving an iron infusion. In regards to afib, she was hospitalized in December for afib, had an unsuccessful DCCV, was scheduled for an outpatient ablation at [place].     Initially had afib with RVR, rates now improved to 80s with metop 100 BID.     Cardiac Meds: xarelto, metop 100 BID    PMHx:   Spinal stenosis    Hashimoto's thyroiditis    Cortes syndrome    Interstitial cystitis    Osteopenia    Torn meniscus    Hypertension    Acute UTI    Acute bronchitis    Hypertension    Hypothyroidism    Vaginal atrophy    Paroxysmal atrial fibrillation        PSHx:   H/O laminectomy    Meniscus tear        Allergies:  morphine (Blisters)  codeine (Nausea)      Current Medications:   acetaminophen     Tablet .. 650 milliGRAM(s) Oral every 6 hours PRN  albuterol/ipratropium for Nebulization 3 milliLiter(s) Nebulizer every 6 hours PRN  aluminum hydroxide/magnesium hydroxide/simethicone Suspension 30 milliLiter(s) Oral every 4 hours PRN  atorvastatin 40 milliGRAM(s) Oral at bedtime  budesonide 160 MICROgram(s)/formoterol 4.5 MICROgram(s) Inhaler 2 Puff(s) Inhalation two times a day  chlorhexidine 2% Cloths 1 Application(s) Topical daily  diphenhydrAMINE 25 milliGRAM(s) Oral once  estradiol 0.01% Vaginal Cream 1 Gram(s) Vaginal <User Schedule>  guaiFENesin Oral Liquid (Sugar-Free) 100 milliGRAM(s) Oral every 6 hours PRN  levothyroxine 75 MICROGram(s) Oral daily  lisinopril 20 milliGRAM(s) Oral two times a day  melatonin 3 milliGRAM(s) Oral at bedtime PRN  metoprolol tartrate 100 milliGRAM(s) Oral two times a day  ondansetron Injectable 4 milliGRAM(s) IV Push every 8 hours PRN  rivaroxaban 15 milliGRAM(s) Oral with dinner      FAMILY HISTORY:  FHx: lung cancer (Father)          REVIEW OF SYSTEMS:  CONSTITUTIONAL: No weakness, fevers or chills  EYES/ENT: No visual changes;  No dysphagia  NECK: No pain or stiffness  RESPIRATORY: No cough, wheezing, hemoptysis; No shortness of breath  CARDIOVASCULAR: No chest pain or palpitations; No lower extremity edema  GASTROINTESTINAL: No abdominal or epigastric pain. No nausea, vomiting, or hematemesis; No diarrhea or constipation. No melena or hematochezia.  BACK: No back pain  GENITOURINARY: No dysuria, frequency or hematuria  NEUROLOGICAL: No numbness or weakness  SKIN: No itching, burning, rashes, or lesions   All other review of systems is negative unless indicated above.    Physical Exam:  T(F): 97.9 (01-13), Max: 98.3 (01-12)  HR: 73 (01-13) (67 - 152)  BP: 139/86 (01-13) (106/75 - 159/98)  RR: 17 (01-13)  SpO2: 98% (01-13)  GEN: NAD  HEENT: EOMI, clear sclera  PULM: CTA b/l, no wheeze  CV: irreg  ABD: S, NT, ND  EXT: WWP, no edema  PSYCH: normal affect  SKIN: No rash    CXR: Personally reviewed    Labs: Personally reviewed    01-13    140  |  103  |  16  ----------------------------<  96  3.7   |  24  |  0.55    Ca    9.1      13 Jan 2024 05:50  Phos  3.0     01-13  Mg     2.10     01-13          CARDIAC MARKERS ( 08 Jan 2024 21:22 )  24 ng/L / x     / x     / x     / x     / x      CARDIAC MARKERS ( 08 Jan 2024 17:40 )  19 ng/L / x     / x     / x     / x     / x                  Thyroid Stimulating Hormone, Serum: 1.39 uIU/mL (01-12 @ 15:40)

## 2024-01-14 VITALS
OXYGEN SATURATION: 100 % | HEART RATE: 79 BPM | TEMPERATURE: 98 F | DIASTOLIC BLOOD PRESSURE: 89 MMHG | RESPIRATION RATE: 18 BRPM | SYSTOLIC BLOOD PRESSURE: 143 MMHG

## 2024-01-14 LAB
ANION GAP SERPL CALC-SCNC: 13 MMOL/L — SIGNIFICANT CHANGE UP (ref 7–14)
ANION GAP SERPL CALC-SCNC: 13 MMOL/L — SIGNIFICANT CHANGE UP (ref 7–14)
BUN SERPL-MCNC: 14 MG/DL — SIGNIFICANT CHANGE UP (ref 7–23)
BUN SERPL-MCNC: 14 MG/DL — SIGNIFICANT CHANGE UP (ref 7–23)
CALCIUM SERPL-MCNC: 9.3 MG/DL — SIGNIFICANT CHANGE UP (ref 8.4–10.5)
CALCIUM SERPL-MCNC: 9.3 MG/DL — SIGNIFICANT CHANGE UP (ref 8.4–10.5)
CHLORIDE SERPL-SCNC: 104 MMOL/L — SIGNIFICANT CHANGE UP (ref 98–107)
CHLORIDE SERPL-SCNC: 104 MMOL/L — SIGNIFICANT CHANGE UP (ref 98–107)
CO2 SERPL-SCNC: 25 MMOL/L — SIGNIFICANT CHANGE UP (ref 22–31)
CO2 SERPL-SCNC: 25 MMOL/L — SIGNIFICANT CHANGE UP (ref 22–31)
CREAT SERPL-MCNC: 0.69 MG/DL — SIGNIFICANT CHANGE UP (ref 0.5–1.3)
CREAT SERPL-MCNC: 0.69 MG/DL — SIGNIFICANT CHANGE UP (ref 0.5–1.3)
CULTURE RESULTS: SIGNIFICANT CHANGE UP
EGFR: 86 ML/MIN/1.73M2 — SIGNIFICANT CHANGE UP
EGFR: 86 ML/MIN/1.73M2 — SIGNIFICANT CHANGE UP
GLUCOSE SERPL-MCNC: 65 MG/DL — LOW (ref 70–99)
GLUCOSE SERPL-MCNC: 65 MG/DL — LOW (ref 70–99)
MAGNESIUM SERPL-MCNC: 1.8 MG/DL — SIGNIFICANT CHANGE UP (ref 1.6–2.6)
MAGNESIUM SERPL-MCNC: 1.8 MG/DL — SIGNIFICANT CHANGE UP (ref 1.6–2.6)
PHOSPHATE SERPL-MCNC: 3.1 MG/DL — SIGNIFICANT CHANGE UP (ref 2.5–4.5)
PHOSPHATE SERPL-MCNC: 3.1 MG/DL — SIGNIFICANT CHANGE UP (ref 2.5–4.5)
POTASSIUM SERPL-MCNC: 3.5 MMOL/L — SIGNIFICANT CHANGE UP (ref 3.5–5.3)
POTASSIUM SERPL-MCNC: 3.5 MMOL/L — SIGNIFICANT CHANGE UP (ref 3.5–5.3)
POTASSIUM SERPL-SCNC: 3.5 MMOL/L — SIGNIFICANT CHANGE UP (ref 3.5–5.3)
POTASSIUM SERPL-SCNC: 3.5 MMOL/L — SIGNIFICANT CHANGE UP (ref 3.5–5.3)
SODIUM SERPL-SCNC: 142 MMOL/L — SIGNIFICANT CHANGE UP (ref 135–145)
SODIUM SERPL-SCNC: 142 MMOL/L — SIGNIFICANT CHANGE UP (ref 135–145)
SPECIMEN SOURCE: SIGNIFICANT CHANGE UP

## 2024-01-14 PROCEDURE — 99232 SBSQ HOSP IP/OBS MODERATE 35: CPT

## 2024-01-14 RX ORDER — MAGNESIUM SULFATE 500 MG/ML
2 VIAL (ML) INJECTION ONCE
Refills: 0 | Status: COMPLETED | OUTPATIENT
Start: 2024-01-14 | End: 2024-01-14

## 2024-01-14 RX ORDER — BUDESONIDE AND FORMOTEROL FUMARATE DIHYDRATE 160; 4.5 UG/1; UG/1
2 AEROSOL RESPIRATORY (INHALATION)
Qty: 1 | Refills: 0
Start: 2024-01-14 | End: 2024-02-12

## 2024-01-14 RX ORDER — LOPERAMIDE HCL 2 MG
2 TABLET ORAL ONCE
Refills: 0 | Status: COMPLETED | OUTPATIENT
Start: 2024-01-14 | End: 2024-01-14

## 2024-01-14 RX ORDER — POTASSIUM CHLORIDE 20 MEQ
40 PACKET (EA) ORAL ONCE
Refills: 0 | Status: COMPLETED | OUTPATIENT
Start: 2024-01-14 | End: 2024-01-14

## 2024-01-14 RX ORDER — METOPROLOL TARTRATE 50 MG
1 TABLET ORAL
Qty: 60 | Refills: 0
Start: 2024-01-14 | End: 2024-02-12

## 2024-01-14 RX ADMIN — Medication 75 MICROGRAM(S): at 05:13

## 2024-01-14 RX ADMIN — Medication 2 MILLIGRAM(S): at 06:15

## 2024-01-14 RX ADMIN — LISINOPRIL 20 MILLIGRAM(S): 2.5 TABLET ORAL at 05:13

## 2024-01-14 RX ADMIN — BUDESONIDE AND FORMOTEROL FUMARATE DIHYDRATE 2 PUFF(S): 160; 4.5 AEROSOL RESPIRATORY (INHALATION) at 08:19

## 2024-01-14 RX ADMIN — Medication 100 MILLIGRAM(S): at 05:13

## 2024-01-14 RX ADMIN — ESTRADIOL 1 GRAM(S): 4 INSERT VAGINAL at 08:20

## 2024-01-14 NOTE — PROGRESS NOTE ADULT - ASSESSMENT
83 yr old female presenting with afib with rvr, acute hypoxic respiratory failure 2/2 copd/asthma

## 2024-01-14 NOTE — PROGRESS NOTE ADULT - PROBLEM SELECTOR PLAN 7
Chronic stable  Continue crestor 10mg daily formulary
Chronic stable  Continue crestor 10mg daily formulary
Chronic stable  Continue crestor 10mg daily formulary  Lipid panel in AM, LDL 45
Chronic stable  Continue crestor 10mg daily formulary
Chronic stable  Continue crestor 10mg daily formulary  Lipid panel in AM, LDL 45

## 2024-01-14 NOTE — PROGRESS NOTE ADULT - SUBJECTIVE AND OBJECTIVE BOX
Patient is a 83y old  Female who presents with a chief complaint of afib with rvr, acute hypoxic respiratory failure 2/2 copd/asthma (13 Jan 2024 14:26)      SUBJECTIVE / OVERNIGHT EVENTS: Ready to go home    MEDICATIONS  (STANDING):  atorvastatin 40 milliGRAM(s) Oral at bedtime  budesonide 160 MICROgram(s)/formoterol 4.5 MICROgram(s) Inhaler 2 Puff(s) Inhalation two times a day  calamine/zinc oxide Lotion 1 Application(s) Topical once  chlorhexidine 2% Cloths 1 Application(s) Topical daily  estradiol 0.01% Vaginal Cream 1 Gram(s) Vaginal <User Schedule>  levothyroxine 75 MICROGram(s) Oral daily  lisinopril 20 milliGRAM(s) Oral two times a day  magnesium sulfate  IVPB 2 Gram(s) IV Intermittent once  metoprolol tartrate 100 milliGRAM(s) Oral two times a day  potassium chloride    Tablet ER 40 milliEquivalent(s) Oral once  rivaroxaban 15 milliGRAM(s) Oral with dinner    MEDICATIONS  (PRN):  acetaminophen     Tablet .. 650 milliGRAM(s) Oral every 6 hours PRN Temp greater or equal to 38C (100.4F), Mild Pain (1 - 3)  albuterol/ipratropium for Nebulization 3 milliLiter(s) Nebulizer every 6 hours PRN Shortness of Breath and/or Wheezing  aluminum hydroxide/magnesium hydroxide/simethicone Suspension 30 milliLiter(s) Oral every 4 hours PRN Dyspepsia  guaiFENesin Oral Liquid (Sugar-Free) 100 milliGRAM(s) Oral every 6 hours PRN Cough  melatonin 3 milliGRAM(s) Oral at bedtime PRN Insomnia  ondansetron Injectable 4 milliGRAM(s) IV Push every 8 hours PRN Nausea and/or Vomiting        CAPILLARY BLOOD GLUCOSE        I&O's Summary    14 Jan 2024 07:01  -  14 Jan 2024 11:57  --------------------------------------------------------  IN: 160 mL / OUT: 0 mL / NET: 160 mL        PHYSICAL EXAM:  GENERAL: NAD, well-developed  HEAD:  Atraumatic, Normocephalic  EYES: EOMI, PERRLA, conjunctiva and sclera clear  NECK: Supple, No JVD  CHEST/LUNG: Clear to auscultation bilaterally; No wheeze  HEART: irregularly irregular  ABDOMEN: Soft, Nontender, Nondistended; Bowel sounds present  EXTREMITIES:  2+ Peripheral Pulses, No clubbing, cyanosis, or edema  PSYCH: AAOx3  NEUROLOGY: non-focal  SKIN: No rashes or lesions    LABS:    01-14    142  |  104  |  14  ----------------------------<  65<L>  3.5   |  25  |  0.69    Ca    9.3      14 Jan 2024 06:37  Phos  3.1     01-14  Mg     1.80     01-14            Urinalysis Basic - ( 14 Jan 2024 06:37 )    Color: x / Appearance: x / SG: x / pH: x  Gluc: 65 mg/dL / Ketone: x  / Bili: x / Urobili: x   Blood: x / Protein: x / Nitrite: x   Leuk Esterase: x / RBC: x / WBC x   Sq Epi: x / Non Sq Epi: x / Bacteria: x        RADIOLOGY & ADDITIONAL TESTS:    Imaging Personally Reviewed:    Consultant(s) Notes Reviewed:      Care Discussed with Consultants/Other Providers:

## 2024-01-14 NOTE — PROVIDER CONTACT NOTE (OTHER) - DATE AND TIME:
10-Brayan-2024 07:07
10-Brayan-2024 07:44
10-Brayan-2024 20:28
11-Jan-2024 04:28
10-Brayan-2024 14:30
10-Brayan-2024 05:09
10-Brayan-2024 05:59
10-Brayan-2024 11:30
11-Jan-2024 10:09
11-Jan-2024 01:45
14-Jan-2024 12:12
09-Jan-2024 20:16
11-Jan-2024 04:14
11-Jan-2024 06:20
09-Jan-2024 16:15
09-Jan-2024 18:22
11-Jan-2024 11:32
11-Jan-2024 03:25

## 2024-01-14 NOTE — PROVIDER CONTACT NOTE (OTHER) - SITUATION
patient bp 200/138
/139
Patient refused magnesium and potassium, pending d/c home
Pt noted rapid Afib upto 120's non sustaining in tele monitoring. HR in between 100-120's
patient bp 198/113
/131
Patient refusing AM labs
Patient's BP is 167/93
patient bp 156/112
Patient's BP is 165/102 and heart rate is 117. Metoprolol 50mg and lisinopril 20mg administered as ordered.
/102
Manual /99
PT refused prednisone and estradiol cream
PT sustaining 154 /96 O2 95 on room air Patient sts asymptomatic
/86, despite the education and encouragement Patient refusing to repeat manual BP, Multiple attempts made. Not cooperative.
Patient's BP is 163/114 and heart rate is 114
patient refusing IVP hydralazine
Hr upto 130's rapid afib, /96 manual

## 2024-01-14 NOTE — PROGRESS NOTE ADULT - PROBLEM SELECTOR PLAN 6
Chronic stable  continue levothyroxine 75mcg daily

## 2024-01-14 NOTE — PROGRESS NOTE ADULT - PROBLEM SELECTOR PLAN 2
CXR: Bilateral perihilar interstitial opacities possibly secondary to mild pulmonary edema. Small left pleural effusion.  s/p duoneb, epi/benadryl/famotidine, rocephin/azithromycin   ED reported wheezing on exam, pt with hx of bronchitis, no smoking hx but lived with father who smoked when younger  Continue duonebs and prednisone 40mg daily   Wean O2 as tolerated  if signs/symptoms of fluid overload develop can consider small dose of lasix
New  T 100.3, HR 95 (150s), /69, RR 18b satting 97% on 3L venturi mask.  CXR: Bilateral perihilar interstitial opacities possibly secondary to mild pulmonary edema. Small left pleural effusion.  s/p duoneb, epi/benadryl/famotidine, rocephin/azithromycin   ED reported wheezing on exam, pt with hx of bronchitis, no smoking hx but lived with father who smoked when younger  Continue duonebs and prednisone 40mg daily   Wean O2 as tolerated  if signs/symptoms of fluid overload develop can consider small dose of lasix
CXR: Bilateral perihilar interstitial opacities possibly secondary to mild pulmonary edema. Small left pleural effusion.  s/p duoneb, epi/benadryl/famotidine, rocephin/azithromycin   now improved
CXR: Bilateral perihilar interstitial opacities possibly secondary to mild pulmonary edema. Small left pleural effusion.  s/p duoneb, epi/benadryl/famotidine, rocephin/azithromycin   ED reported wheezing on exam, pt with hx of bronchitis, no smoking hx but lived with father who smoked when younger  Continue duonebs and prednisone 40mg daily   Wean O2 as tolerated  if signs/symptoms of fluid overload develop can consider small dose of lasix
CXR: Bilateral perihilar interstitial opacities possibly secondary to mild pulmonary edema. Small left pleural effusion.  s/p duoneb, epi/benadryl/famotidine, rocephin/azithromycin   now improved

## 2024-01-14 NOTE — PROGRESS NOTE ADULT - PROBLEM SELECTOR PLAN 4
Chronic stable  receiving iron infusions- today was the second infusion  h/h stable- continue to monitor, consider stopping iron transfusions

## 2024-01-14 NOTE — PROVIDER CONTACT NOTE (OTHER) - BACKGROUND
Admitted for shortness of breath and A-Fib
83 year old female admitted for shortness of breath
83 year old female admitted for shortness of breath
Admitted for shortness of breath and A-Fib
83 year old female admitted for shortness of breath
84 YO F with PMHx of HTN, HLD, CAD s/p PCI, Hashimoto Thyroiditis, COPD (no smoking hx but lived with father of whom smoked), Recurrent UTIs, Spinal Stenosis s/p Laminectomy, GEORGIE, TIA, and PAFIB
Patient admitted with shortness of breath and HTN, CAD, COPD, recurrent UTI's
Admitted for shortness of breath and A-Fib
Patient admitted with shortness of breath and HTN, CAD, COPD, recurrent UTI's
SOB
Patient admitted with shortness of breath and HTN, CAD, COPD, recurrent UTI's
Patient admitted with shortness of breath and HTN, CAD, COPD, recurrent UTI's
Admitted for shortness of breath and A-Fib with RVR.
shortness of breath
Patient admitted with shortness of breath and HTN, CAD, COPD, recurrent UTI's

## 2024-01-14 NOTE — PROGRESS NOTE ADULT - REASON FOR ADMISSION
afib with rvr, acute hypoxic respiratory failure 2/2 copd/asthma

## 2024-01-14 NOTE — PROGRESS NOTE ADULT - PROBLEM SELECTOR PROBLEM 7
HLD (hyperlipidemia)
no

## 2024-01-14 NOTE — PROVIDER CONTACT NOTE (OTHER) - ASSESSMENT
Patient denies SOB, chest pain, and discomfort. No acute changes noted. Patient is currently resting comfortably in bed. Patient safety, comfort and fall risk maintained. Turned and repositioned Q2 hours. Bed alarm on and call bell within reach.

## 2024-01-14 NOTE — PROVIDER CONTACT NOTE (OTHER) - REASON
Pt noted rapid Afib upto 120's non sustaining in tele monitoring. HR in between 100-120's
patient refusing IVP hydralazine
/86
Patient's BP is 165/102 and heart rate is 117
/131
patient bp 198/113
patient bp 200/138
/139
patient bp 156/112
Hr upto 130's rapid afib, /96 manual
Manual /99
Patient's BP is 167/93
/102
Patient's BP is 163/114 and heart rate is 114
Patient refusing AM labs
PT sustaining 154 /96 O2 95 on room air Patient sts asymptomatic
Patient refused magnesium and potassium, pending d/c home
PT refused prednisone and estradiol cream

## 2024-01-14 NOTE — PROGRESS NOTE ADULT - PROBLEM SELECTOR PLAN 5
Chronic stable  /69  s/p diltiazem 60mg PO x1,  diltiazem 20mg IV x1  Continue home metoprolol tartrate 50mg BID, lisinopril 20mg BID  Monitor
Chronic stable  continue lisinopril
Chronic stable  /69  s/p diltiazem 60mg PO x1,  diltiazem 20mg IV x1  Continue home metoprolol tartrate 50mg BID, lisinopril 20mg BID  Monitor
Chronic stable  /69  s/p diltiazem 60mg PO x1,  diltiazem 20mg IV x1  Continue home metoprolol tartrate 50mg BID, lisinopril 20mg BID  Monitor
Chronic stable  continue lisinopril

## 2024-01-14 NOTE — PROGRESS NOTE ADULT - PROBLEM SELECTOR PLAN 1
now improved, some episodes of bradycardia overnight, asymptomatic  discharge on 75 mg Lopressor bid with o/p follow up  35 minutes on discharge plan of care including reviewing summary and discussing plan with patient

## 2024-01-14 NOTE — CHART NOTE - NSCHARTNOTEFT_GEN_A_CORE
Hx COPD. Emailed [email or web address] for pulmonary follow up as patient is planned for discharge today. Hx COPD. Emailed [email or web address] for pulmonary follow up as patient is planned for discharge today. Hx COPD. Emailed [email or web address] for pulmonary follow up as patient is planned for discharge today.    Sent metoprolol 75mg BID for home due to episodes of bradycardia overnight - HR currently in 60s bpm. Rate controlled. Discussed with Attending Dr. Light. Hx COPD. Emailed [email or web address] for pulmonary follow up as patient is planned for discharge today.    Sent metoprolol 75mg BID for home due to episodes of bradycardia overnight - HR currently in 60s bpm. Rate controlled. Discussed with Attending Dr. Light.

## 2024-01-16 ENCOUNTER — APPOINTMENT (OUTPATIENT)
Dept: PULMONOLOGY | Facility: CLINIC | Age: 84
End: 2024-01-16

## 2024-01-19 NOTE — PHYSICAL EXAM
[Normal] : affect appropriate [de-identified] : multiple ecchymoses on b/l LE and UE, also one on mid chest

## 2024-01-19 NOTE — HISTORY OF PRESENT ILLNESS
[de-identified] : 83yo F with HTN, HLD, hypothyroidism here for further management of iron deficiency anemia.   Pt reports that in March, she was hospitalized at [place] after a syncopal episode, found to have Na 120. She was taken off diuretics. She follows closely with cardiologist at [place] -notes that he called her in Sept b/c Hgb dropped. She was hospitalized again, this time at [place], in Sept for a TIA, started on Plavix in addition to ASA.  10/7/22 showed iron 17, TIBC 482, TSAT 4%, ferritin 7 She saw Dr. Santosh LÓPEZ who is planning to do an endoscopy. He prescribed pt iron supplements but she hasn't started them yet.   She gets cold easily but otherwise feels well.  12/15:   Patient is seen today in the tx rm accompanied by her . She feels well overall.  c/o upsetting stomach and constipation when on SlowFe once daily. We discussed on the phone to katty to IV iron. She was agreeable.  Feraheme x 2 is scheduled today and next Friday.  [de-identified] : After last visit on 10/27/22, pt started on iron supplement, tolerated it well. She had EDG done 1 wk ago no obvious bleeding per pt; she had a capsule study scheduled on 11/21 next week, she has been hold iron pill since last Monday. She felt cold dizzy as usual. She worried that holding iron pill may make her hgb drop, she called our office to check her CBC today.  Her BP is high today, she is on BP medication nacinopril 20mg metroprolol hydralzine 50mg BID, she is very anxious about Hgb dropping.   She had EDG and capsule study last Nov, GI Dr. Willis said she had inflammation in the stomach but no bleeding issues.  She was advised to be off iron supplement (Slow Fe once daily) for 1 months. if hgb going down then he will do colonoscopy. She has been off Slow Fe for 1 month now.   She had diarrhea in April. Had colonoscopy done on 4/24/23 which showed lymphocytic colitis. She was started on budesonide which has improved the diarrhea.  She is also complaining of increased bruising since Oct when she had a ? stroke. She is on ASA long term and they added Plavix after. It was stopped in Jan for the increased bruising but those have not subsided.  She is taking cranberry capsules and probiotics.   Her diarrhea came back the last couple of days.  She continues having bruising. Coags and plts all wnl.

## 2024-01-19 NOTE — ASSESSMENT
[FreeTextEntry1] : 81yo F with HTN, HLD, hypothyroidism here for further management of iron deficiency anemia.  Hgb 12.8 today. She is off iron supplementation -she notes that her GI doctor stopped it b/c she didn't need it, iron studies done 11/6/23 showed ferritin 69, iron sat 6%, iron serum 24, TIBC high, Dr. Ritter reviewed lab results w/pt and advised pt to restart iron supplement; Pt called our office c/o upsetting stomach/constipation when took SlowFe once daily, we discussed to switch to Feraheme. Benefit and side effect explained to pt w/ detail, a copy of medication information printed out and gave to pt. Informed consent obtained today. She verbalized understanding and agreed the plan. She had recent colonoscopy showing lymphocytic colitis, started on budesonide. Having diarrhea again so awaiting eval from GI All questions answered RTC in 5 months.  Case and management discussed with Dr. Ritter

## 2024-01-19 NOTE — REVIEW OF SYSTEMS
[Easy Bruising] : a tendency for easy bruising [Negative] : Allergic/Immunologic [Easy Bleeding] : no tendency for easy bleeding

## 2024-01-29 ENCOUNTER — TRANSCRIPTION ENCOUNTER (OUTPATIENT)
Age: 84
End: 2024-01-29

## 2024-01-30 ENCOUNTER — TRANSCRIPTION ENCOUNTER (OUTPATIENT)
Age: 84
End: 2024-01-30

## 2024-01-31 ENCOUNTER — APPOINTMENT (OUTPATIENT)
Dept: PULMONOLOGY | Facility: CLINIC | Age: 84
End: 2024-01-31

## 2024-01-31 ENCOUNTER — APPOINTMENT (OUTPATIENT)
Dept: PULMONOLOGY | Facility: CLINIC | Age: 84
End: 2024-01-31
Payer: MEDICARE

## 2024-01-31 ENCOUNTER — TRANSCRIPTION ENCOUNTER (OUTPATIENT)
Age: 84
End: 2024-01-31

## 2024-01-31 VITALS — HEART RATE: 56 BPM | SYSTOLIC BLOOD PRESSURE: 183 MMHG | OXYGEN SATURATION: 98 % | DIASTOLIC BLOOD PRESSURE: 87 MMHG

## 2024-01-31 DIAGNOSIS — J44.9 CHRONIC OBSTRUCTIVE PULMONARY DISEASE, UNSPECIFIED: ICD-10-CM

## 2024-01-31 PROBLEM — I48.0 PAROXYSMAL ATRIAL FIBRILLATION: Chronic | Status: ACTIVE | Noted: 2024-01-08

## 2024-01-31 PROCEDURE — 71046 X-RAY EXAM CHEST 2 VIEWS: CPT

## 2024-01-31 PROCEDURE — 94727 GAS DIL/WSHOT DETER LNG VOL: CPT

## 2024-01-31 PROCEDURE — 94729 DIFFUSING CAPACITY: CPT

## 2024-01-31 PROCEDURE — 99496 TRANSJ CARE MGMT HIGH F2F 7D: CPT

## 2024-01-31 PROCEDURE — 94010 BREATHING CAPACITY TEST: CPT

## 2024-01-31 NOTE — REASON FOR VISIT
[Follow-Up] : a follow-up visit [TextBox_44] : pleural effusion, pleurisy  [FreeTextEntry2] : OAD post Flu

## 2024-01-31 NOTE — PROCEDURE
[FreeTextEntry1] : PFT 1/31/24  mild reduction flow  rates  Mild OAD  TLC 78 %  DL:CO 65 %  mild combined restrictive and obstructive ventilatory impairment with mild  gas  exchange impairment HGB 12.8  CXR PA LAT 1/31/24  cardiomegaly  scoliosis  loop recorder  LLL no evidence pleural effusion or consolidation as noted reported  below  Most up to date imaging [facility] chest x-ray January 27, 2024 Left pleural effusion with left lower lobe airspace disease rule out pneumonia in appropriate clinical setting Follow-up CT chest angiogram January 22, 2024 Moderate left and small right pleural effusion Underlying pulmonary opacities attributed to atelectasis Small pericardial effusion Multivessel coronary artery disease Small hiatal hernia Negative adenopathy  /  PFT May 24, 2023 Mild restrictive ventilatory impairment Very mild obstructive component with FEV1 FVC ratio 76 No air trapping Diffusion with mild borderline moderate reduction 61% predicted loss functioning alveolar capillary units Hemoglobin 12.4 Data comparison to prior PFT September 7, 2022 there is decline at the TLC and minimal decline at the diffusion with stable FEV1 Clinical correlation Patient asymptomatic Follow-up 3 months if continued decline of pulmonary function testing evaluate CT chest  [place] No BD 1/9/23 mild reduction flow rates  ratio 66 Mild OAD  [place] No BD 10/7/22 severe reduction flow rates  pos decline HGB 7.0  X-ray PA lateral October 7, 2022 indication chest congestion Borderline cardiomegaly Mild calcification aortic knob No parenchymal infiltrates pleural effusions dominant pulmonary nodules Thinning vertebral  spine Mild increased retrosternal airspace on lateral view No gross interval change compared to chest x-ray of July 2022  PFT 9/7/2022 Moderate OAD Lung Volumes nl DLCO 66 % with mild loss fx  alveolar  capillary units  HGB 12.0 NIOX  8  ppb WNL 9/7/2022 stable pulmonary physiology  PFT June 8, 2022 Mild obstructive ventilatory impairment Significant 25% response to bronchodilator at the FEV1 Lung volumes normal Total lung capacity 89% predicted Diffusion low normal range 70% predicted Hemoglobin 10.7 Data comparison to April 22, 2022 demonstrates stable FVC with just greater than 200 cc interval improvement at the FEV1  Chest x-ray PA lateral june 8 2022 Borderline cardiomegaly Right lateral scoliosis Lung fields are clear No evidence of cephalization or congestion No interstitial changes Costophrenic angles are clear No recurrence of pleural effusion  Pulmonary exercise study 6/8/22 RA study  neg desaturation  Chest x-ray PA lateral May 6, 2022 Cardiomegaly Right lung is clear No evidence for a right pleural effusion Left costophrenic angle blunted with some component of atelectasis Comparison to chest x-ray of April 22, 2022 which demonstrated a very minimal scar atelectatic changes with possible left pleural effusion although differences in technique does suggest some level of improvement clinical correlate with exam with no crackles or auscultated at today's visit  Chest x-ray PA lateral April 22, 2022 Blunting left costophrenic angle Patient's x-ray was done with overlying draw Difficult to assess No clear parenchymal infiltrate or pleural effusion Rule out increase shortness of breath secondary to congestive heart failure The left pleural effusion is new Compared to a chest x-ray of January 13, 2021  NAHUM 4/22/22 Mod severe OAD  PFT 10/20/21 Flow rates nl  minimal OAD ratio 78 Lung Volumes nl  DLCO  78 % HGB 11.5  PFT 4/15/21 Mild  borderline  moderate  OAD Lung volumes normal range DLCO  83 % HGGB 10.4 NIOX  12  Nl range  4/15/21  PFT 1/13/21 mild  mod reduction flow rates Moderate OAD Normal lung volumes Normal DLCO 79 % pred noted mild decline pulmonary physiology  X-ray PA lateral projection January 13, 2021 Borderline cardiomegaly Right lateral scoliosis Lung fields otherwise clear without parenchymal infiltrate pleural effusion or dominant pulmonary nodules Soft tissue bony structures otherwise grossly unremarkable Impression clear lungs no gross evidence for rib fractures or infiltrates No interval change compared to chest x-ray July 15, 2020  Chest x-ray PA lateral July 15, 2020 Borderline cardiomegaly Hyperaeration Clear lungs No parenchymal infiltrate pleural effusions or dominant pulmonary nodules No interval change compared to chest x-ray of December 20, 2019  PFT Body BOX July 10 2020 mild OAD  No BD at FEV1 Normal lung volumes  sp conductance and resistance normal Diffusion 80 % pred normal  HGB 13.0   Spirometry March 9, 2020 Mild obstructive ventilatory impairment No bronchodilator response at FEV1 Stable FEV1  Chest x-ray PA lateral December 20, 2019 Normal cardiac size Mild right lateral scoliosis Suggestion of some bronchiectatic changes at right lower lung zone This is unchanged compared to the recent chest x-ray of December 16, 2019 No consolidation consistent with active pneumonia  States Flu vaccination up-to-date History of pneumococcal vaccination and Prevnar administered  PFT December 16, 2019 Mild obstructive ventilatory impairment No response to bronchodilator at the FEV1 Lung volumes are normal with total capacity 95% predicted. Diffusion relatively normal 78% of predicted. Hemoglobin 13.7  Chest x-ray PA lateral December 16 2019 Mild cardiomegaly Left lower lung zone minimal discoid linear atelectatic change No parenchymal infiltrates pleural effusions dominant pulmonary nodules Soft tissue bony structures grossly normal Impression no evidence of pneumonia  Data review 12/21/2019 Serum sodium 134 with serum chloride 93 normal renal function CBC White blood count 7.37 hemoglobin 14.3 hematocrit 43.4 Procalcitonin negative RVP positive influenza A  CT chest December 26, 2019 Right mid lower lung zone tree-in-bud with centrilobular groundglass nodular opacities most consistent with impacted distal airways  Blood draw  HD Flu vaccine 9/7/2022

## 2024-01-31 NOTE — CONSULT LETTER
[Dear  ___] : Dear  [unfilled], [Courtesy Letter:] : I had the pleasure of seeing your patient, [unfilled], in my office today. [Please see my note below.] : Please see my note below. [Sincerely,] : Sincerely, [FreeTextEntry3] : Bharat Arguelles D.O., JYOTI\par   of Medicine\par  [facility] [employer]\par

## 2024-01-31 NOTE — DISCUSSION/SUMMARY
[FreeTextEntry1] : Post hospital interval improvement effusion atlectasis pneumonia pleurisy  Abnl PFT with decline  at [facility]  f/u EP cardiology [facility]  r/o sec  anemia on Plavix and ASP anemia w/u possible HEME  GI   post hyponatremia per cardiology on daily lasix 40 mg "Impaction distal bronchi Obstructive airway disease Mild decline pulmonary physiology- restart Trelegy Elipita- sample dose 200 mcg dose Post TIA on Plavix management neurology follow-up cardiology Other history as noted above Vaccination and pneumococcal profile up to date Advised if needs to use should notify us for evaluation [facility] cancer screening F/U Héctor Frost Colonoscopyendoscopy up to  date Spinal disease   PFIZER  completed X 3 management of boosters including:  valent variant Flu  vaccine up to date  cardiology  management of blood pressure Based historically on the severe low sodium with syncope patient is hesitant to restart any diuretic even with the noted small left pleural effusion above Will renew Xanax Advised if still not feeling well report immediately to the emergency department at [place] or [place] for reevaluation Discussed with PMD  with holding of diuretic pos  decline of pulmonary fx off resp controller therapy  as noted above RS

## 2024-01-31 NOTE — HISTORY OF PRESENT ILLNESS
[Never] : never [Stable] : are stable [None] : ~He/She~ has no significant interval events [Difficulty Breathing During Exertion] : denies dyspnea on exertion [Feelings Of Weakness On Exertion] : denies exercise intolerance [Cough] : denies coughing [Wheezing] : denies wheezing [Regional Soft Tissue Swelling Both Lower Extremities] : denies lower extremity edema [Chest Pain Or Discomfort] : denies chest pain [Fever] : denies fever [TextBox_4] : Chart review subsequent [facility] transition of care D./C 1/27/24 told pleurisy and coronavirus tylenol drip completed prednisone po s/p fall slip at home fx ORTHO placed in splint but states no fx  Dec 15 2023 [facility] A FIB  followed  fever post procedure Fe infusion with RTX management Dr Ritter Admission [facility] Allergic reaction shortness of breath Chest x-ray January 9, 2024 Cardiomegaly Left pleural effusion Reported left hemidiaphragm obscured likely effusion/atelectasis Clinical indication wheezing Loop recorder identified Addendum Chart review Admission Atrial fibrillation regula rate control Acute hypoxemic respiratory failure Underlying history of COPD asthma 83-year-old female past medical hypertension hyperlipidemia coronary artery disease history of PCI COPD Hashimoto's thyroiditis spinal stenosis TIAs paroxysmal atrial fibrillation Raghav was concerned that she was having an allergic reaction receiving an iron infusion as per the second time She felt increased work of breathing and shortness of breath Received epi and Solu-Medrol with some resultant shaking for a few minutes Noted to be in atrial fibrillation Positive low-grade fever temperature 100.3 Hemodynamically stable blood pressure 121/69 O2 saturations 3 L Venturi mask 97% Respiratory exam did not report a cough wheeze but positive shortness of breath Reported clear lungs Cardiac S1-S2 irregularly irregular Laboratory data WBC 15.68 Hemoglobin hematocrit 48.3 platelets 392,000 Serum potassium 3.8 Creatinine 0.58 Serum calcium 9.0 Mild elevation liver enzymes  ALT 52 Alkaline phosphatase 224 Total bilirubin normal Venous blood gas pH is initially 7.22 follow-up 7.39 Lactate one 3.5 follow-up 1.7 Troponin 2019 and 24 normal range Noted in body of history physical chest x-ray bilateral perihilar interstitial opacities felt secondary to mild pulmonary edema with small left pleural effusion EKG atrial fibrillation Overall impression Atrial fibrillation rapid ventricular response new Treatment included diltiazem 60 mg by mouth 20 mg IV At home had been on metoprolol 50 mg twice daily Xarelto on hold Heparin drip Cardiology consultation and loop recorder interrogation integration Acute respiratory failure Rule out secondary to pulmonary edema Treatments included DuoNeb epinephrine Benadryl Pepcid Treatment protocol with antibiotic Rocephin Zithromax Emergency department did report wheezing on exam not noted in the body of the report reviewed Treatment including DuoNeb and 40 mg prednisone daily oxygen to be weaned Admission date January 8 discharged January 14, 2024 Patient was admitted with atrial fibrillation Acute hypoxemic respiratory tract failure secondary to underlying COPD asthma Patient will be discharged on 75 mg of Lopressor twice daily Acute respiratory failure with hypoxemia Chest x-ray demonstrated bilateral perihilar interstitial opacities most consistent with pulmonary edema small left effusion Treatment it did include DuoNeb Rocephin and Zithromax with interval improvement Asthma component as noted treatment above Iron deficiency anemia Hypertension Hypothyroidism on levothyroxine 75 mcg daily Hyperlipidemia Crestor 10 mg daily Chest x-ray January 8, 2024 Cardiomegaly with left pleural effusion  states cardiac off lasix sec low Na but at present OFF HCTZ Lisinpril 20 mg BID  Metoprol 25 BID  Norvasc 2.5  post visit Cardiology [facility] no edema  GI virus management with Dr Frost HEME noted completed EGD and capsule study  Anemia (285.9) (D64.9) 83yo F with HTN, HLD, hypothyroidism here for further management of iron deficiency anemia.   Noted Renal Dr Rico to manage hyponatremia notes off diuretic some inc SOB pos croupy cough pos mucous green no fever chills  noted per cardiac HGB 10.0 seen GI Héctor Frost recommended endoscopy but at present at [facility] ECHO at [facility]  9/26/2 EF > 75 % no reported valve disease or Pul HTN  Post hospitalization [facility] 10/30/2022 Get MRI told it was possible TIA a and was placed on Plavix Follow-up neurology cardiology renal noted that she was off her diuretic and the serum sodium was 135 in the hospital pos SOB DODGE HTN  rx meds Hydralazine lisinopril Metoprolol  [facility] review of imaging studies March 16, 2022 CT pelvis No fractures Lumbar disc disease Moderate to severe spinal stenosis CT cervical spine and head negative X-ray of hip March 14, 2022 no displaced fracture No chest x-ray reviewed available for review  Discharge medication reviewed Aspirin 81 mg Density 200 mg 1 deficit Zofran Pantoprazole 40 mg Hydralazine 50 mg 3 times daily As needed Xanax Gabapentin 600 mg 3 times daily Lisinopril 20 mg twice daily Metoprolol 25 mg twice daily Was was also treated with nitrofurantoin   completed PFIZER  COVID vaccine [Wt Gain ___ Lbs] : no recent weight gain [Wt Loss ___ Lbs] : no recent weight loss [Oxygen] : the patient uses no supplemental oxygen

## 2024-02-02 ENCOUNTER — APPOINTMENT (OUTPATIENT)
Dept: PULMONOLOGY | Facility: CLINIC | Age: 84
End: 2024-02-02
Payer: MEDICARE

## 2024-02-02 ENCOUNTER — NON-APPOINTMENT (OUTPATIENT)
Age: 84
End: 2024-02-02

## 2024-02-02 ENCOUNTER — LABORATORY RESULT (OUTPATIENT)
Age: 84
End: 2024-02-02

## 2024-02-02 VITALS — OXYGEN SATURATION: 98 % | HEART RATE: 84 BPM | SYSTOLIC BLOOD PRESSURE: 189 MMHG | DIASTOLIC BLOOD PRESSURE: 108 MMHG

## 2024-02-02 DIAGNOSIS — I25.10 ATHEROSCLEROTIC HEART DISEASE OF NATIVE CORONARY ARTERY W/OUT ANGINA PECTORIS: ICD-10-CM

## 2024-02-02 PROCEDURE — 99214 OFFICE O/P EST MOD 30 MIN: CPT

## 2024-02-02 PROCEDURE — 71046 X-RAY EXAM CHEST 2 VIEWS: CPT

## 2024-02-02 RX ORDER — PREDNISONE 10 MG/1
10 TABLET ORAL
Qty: 30 | Refills: 1 | Status: ACTIVE | COMMUNITY
Start: 2024-02-02 | End: 1900-01-01

## 2024-02-02 NOTE — PROCEDURE
[FreeTextEntry1] : Repeat chest x-ray February 2, 2024 1 view PA Cardiac size enlarged Scoliosis Loop recorder No pneumothorax no interval change No evidence for pleural effusion   PFT 1/31/24  mild reduction flow  rates  Mild OAD  TLC 78 %  DL:CO 65 %  mild combined restrictive and obstructive ventilatory impairment with mild  gas  exchange impairment HGB 12.8  CXR PA LAT 1/31/24  cardiomegaly  scoliosis  loop recorder  LLL no evidence pleural effusion or consolidation as noted reported  below  Most up to date imaging [facility] chest x-ray January 27, 2024 Left pleural effusion with left lower lobe airspace disease rule out pneumonia in appropriate clinical setting Follow-up CT chest angiogram January 22, 2024 Moderate left and small right pleural effusion Underlying pulmonary opacities attributed to atelectasis Small pericardial effusion Multivessel coronary artery disease Small hiatal hernia Negative adenopathy  /  PFT May 24, 2023 Mild restrictive ventilatory impairment Very mild obstructive component with FEV1 FVC ratio 76 No air trapping Diffusion with mild borderline moderate reduction 61% predicted loss functioning alveolar capillary units Hemoglobin 12.4 Data comparison to prior PFT September 7, 2022 there is decline at the TLC and minimal decline at the diffusion with stable FEV1 Clinical correlation Patient asymptomatic Follow-up 3 months if continued decline of pulmonary function testing evaluate CT chest  [place] No BD 1/9/23 mild reduction flow rates  ratio 66 Mild OAD  [place] No BD 10/7/22 severe reduction flow rates  pos decline HGB 7.0  X-ray PA lateral October 7, 2022 indication chest congestion Borderline cardiomegaly Mild calcification aortic knob No parenchymal infiltrates pleural effusions dominant pulmonary nodules Thinning vertebral  spine Mild increased retrosternal airspace on lateral view No gross interval change compared to chest x-ray of July 2022  PFT 9/7/2022 Moderate OAD Lung Volumes nl DLCO 66 % with mild loss fx  alveolar  capillary units  HGB 12.0 NIOX  8  ppb WNL 9/7/2022 stable pulmonary physiology  PFT June 8, 2022 Mild obstructive ventilatory impairment Significant 25% response to bronchodilator at the FEV1 Lung volumes normal Total lung capacity 89% predicted Diffusion low normal range 70% predicted Hemoglobin 10.7 Data comparison to April 22, 2022 demonstrates stable FVC with just greater than 200 cc interval improvement at the FEV1  Chest x-ray PA lateral june 8 2022 Borderline cardiomegaly Right lateral scoliosis Lung fields are clear No evidence of cephalization or congestion No interstitial changes Costophrenic angles are clear No recurrence of pleural effusion  Pulmonary exercise study 6/8/22 RA study  neg desaturation  Chest x-ray PA lateral May 6, 2022 Cardiomegaly Right lung is clear No evidence for a right pleural effusion Left costophrenic angle blunted with some component of atelectasis Comparison to chest x-ray of April 22, 2022 which demonstrated a very minimal scar atelectatic changes with possible left pleural effusion although differences in technique does suggest some level of improvement clinical correlate with exam with no crackles or auscultated at today's visit  Chest x-ray PA lateral April 22, 2022 Blunting left costophrenic angle Patient's x-ray was done with overlying draw Difficult to assess No clear parenchymal infiltrate or pleural effusion Rule out increase shortness of breath secondary to congestive heart failure The left pleural effusion is new Compared to a chest x-ray of January 13, 2021  MOUSTAPHA 4/22/22 Mod severe OAD  PFT 10/20/21 Flow rates nl  minimal OAD ratio 78 Lung Volumes nl  DLCO  78 % HGB 11.5  PFT 4/15/21 Mild  borderline  moderate  OAD Lung volumes normal range DLCO  83 % HGGB 10.4 NIOX  12  Nl range  4/15/21  PFT 1/13/21 mild  mod reduction flow rates Moderate OAD Normal lung volumes Normal DLCO 79 % pred noted mild decline pulmonary physiology  X-ray PA lateral projection January 13, 2021 Borderline cardiomegaly Right lateral scoliosis Lung fields otherwise clear without parenchymal infiltrate pleural effusion or dominant pulmonary nodules Soft tissue bony structures otherwise grossly unremarkable Impression clear lungs no gross evidence for rib fractures or infiltrates No interval change compared to chest x-ray July 15, 2020  Chest x-ray PA lateral July 15, 2020 Borderline cardiomegaly Hyperaeration Clear lungs No parenchymal infiltrate pleural effusions or dominant pulmonary nodules No interval change compared to chest x-ray of December 20, 2019  PFT Body BOX July 10 2020 mild OAD  No BD at FEV1 Normal lung volumes  sp conductance and resistance normal Diffusion 80 % pred normal  HGB 13.0   Spirometry March 9, 2020 Mild obstructive ventilatory impairment No bronchodilator response at FEV1 Stable FEV1  Chest x-ray PA lateral December 20, 2019 Normal cardiac size Mild right lateral scoliosis Suggestion of some bronchiectatic changes at right lower lung zone This is unchanged compared to the recent chest x-ray of December 16, 2019 No consolidation consistent with active pneumonia  States Flu vaccination up-to-date History of pneumococcal vaccination and Prevnar administered  PFT December 16, 2019 Mild obstructive ventilatory impairment No response to bronchodilator at the FEV1 Lung volumes are normal with total capacity 95% predicted. Diffusion relatively normal 78% of predicted. Hemoglobin 13.7  Chest x-ray PA lateral December 16 2019 Mild cardiomegaly Left lower lung zone minimal discoid linear atelectatic change No parenchymal infiltrates pleural effusions dominant pulmonary nodules Soft tissue bony structures grossly normal Impression no evidence of pneumonia  Data review 12/21/2019 Serum sodium 134 with serum chloride 93 normal renal function CBC White blood count 7.37 hemoglobin 14.3 hematocrit 43.4 Procalcitonin negative RVP positive influenza A  CT chest December 26, 2019 Right mid lower lung zone tree-in-bud with centrilobular groundglass nodular opacities most consistent with impacted distal airways  Blood draw  HD Flu vaccine 9/7/2022

## 2024-02-02 NOTE — PHYSICAL EXAM
[General Appearance - Well Developed] : well developed [Normal Appearance] : normal appearance [Well Groomed] : well groomed [General Appearance - Well Nourished] : well nourished [No Deformities] : no deformities [General Appearance - In No Acute Distress] : no acute distress [Normal Oropharynx] : normal oropharynx [I] : I [Neck Appearance] : the appearance of the neck was normal [Neck Cervical Mass (___cm)] : no neck mass was observed [Jugular Venous Distention Increased] : there was no jugular-venous distention [Thyroid Diffuse Enlargement] : the thyroid was not enlarged [Heart Rate And Rhythm] : heart rate and rhythm were normal [Heart Sounds] : normal S1 and S2 [Murmurs] : no murmurs present [Arterial Pulses Normal] : the arterial pulses were normal [Edema] : no peripheral edema present [Veins - Varicosity Changes] : no varicosital changes were noted in the lower extremities [Respiration, Rhythm And Depth] : normal respiratory rhythm and effort [Exaggerated Use Of Accessory Muscles For Inspiration] : no accessory muscle use [Chest Palpation] : palpation of the chest revealed no abnormalities [Lungs Percussion] : the lungs were normal to percussion [Bowel Sounds] : normal bowel sounds [Abdomen Soft] : soft [Abdomen Tenderness] : non-tender [Abdomen Mass (___ Cm)] : no abdominal mass palpated [Abnormal Walk] : normal gait [Nail Clubbing] : no clubbing of the fingernails [Cyanosis, Localized] : no localized cyanosis [Petechial Hemorrhages (___cm)] : no petechial hemorrhages [FreeTextEntry1] : varicose veins [Skin Color & Pigmentation] : normal skin color and pigmentation [No Venous Stasis] : no venous stasis [] : no rash [Skin Lesions] : no skin lesions [No Skin Ulcers] : no skin ulcer [No Xanthoma] : no  xanthoma was observed [Deep Tendon Reflexes (DTR)] : deep tendon reflexes were 2+ and symmetric [Sensation] : the sensory exam was normal to light touch and pinprick [No Focal Deficits] : no focal deficits [Oriented To Time, Place, And Person] : oriented to person, place, and time [Impaired Insight] : insight and judgment were intact [Affect] : the affect was normal [Mood] : the mood was normal

## 2024-02-02 NOTE — DISCUSSION/SUMMARY
[FreeTextEntry1] : Post hospital interval improvement effusion atlectasis pneumonia pleurisy  Abnl PFT with decline  at [facility]CO  f/u EP cardiology [facility] Still with pleuritic type of pain on anticoagulation Low clinical suspicion for PE Will check D-dimer for blood work for completeness Prednisone 40 mg with a 10 mg q. taper   r/o sec  anemia on Plavix and ASP anemia w/u possible HEME  GI   post hyponatremia per cardiology on daily lasix 40 mg "Impaction distal bronchi Obstructive airway disease Mild decline pulmonary physiology- restart Trelegy Elipita- sample dose 200 mcg dose Post TIA on Plavix management neurology follow-up cardiology Other history as noted above Vaccination and pneumococcal profile up to date Advised if needs to use should notify us for evaluation [facility] cancer screening F/U Héctor Frost Colonoscopyendoscopy up to  date Spinal disease   PFIZER  completed X 3 management of boosters including:  valent variant Flu  vaccine up to date  cardiology  management of blood pressure Based historically on the severe low sodium with syncope patient is hesitant to restart any diuretic even with the noted small left pleural effusion above Will renew Xanax Advised if still not feeling well report immediately to the emergency department at [place] or [place] for reevaluation Discussed with PMD  with holding of diuretic pos  decline of pulmonary fx off resp controller therapy  as noted above RS

## 2024-02-02 NOTE — REASON FOR VISIT
[TextBox_44] : pleural effusion, pleurisy  chest  wall pain [Follow-Up] : a follow-up visit [FreeTextEntry2] : OAD post Flu

## 2024-02-02 NOTE — CONSULT LETTER
[Dear  ___] : Dear  [unfilled], [Courtesy Letter:] : I had the pleasure of seeing your patient, [unfilled], in my office today. [Please see my note below.] : Please see my note below. [Sincerely,] : Sincerely, [FreeTextEntry3] : Bharat Arguelles D.O., JYOTI\par   of Medicine\par  [facility] [employer]\par

## 2024-02-03 ENCOUNTER — NON-APPOINTMENT (OUTPATIENT)
Age: 84
End: 2024-02-03

## 2024-02-03 LAB
ANION GAP SERPL CALC-SCNC: 15 MMOL/L
BASOPHILS # BLD AUTO: 0.11 K/UL
BASOPHILS NFR BLD AUTO: 0.8 %
BUN SERPL-MCNC: 12 MG/DL
CALCIUM SERPL-MCNC: 9.3 MG/DL
CHLORIDE SERPL-SCNC: 98 MMOL/L
CO2 SERPL-SCNC: 24 MMOL/L
CREAT SERPL-MCNC: 0.49 MG/DL
DEPRECATED D DIMER PPP IA-ACNC: 567 NG/ML DDU
EGFR: 93 ML/MIN/1.73M2
EOSINOPHIL # BLD AUTO: 0 K/UL
EOSINOPHIL NFR BLD AUTO: 0 %
GLUCOSE SERPL-MCNC: 115 MG/DL
HCT VFR BLD CALC: 45.1 %
HGB BLD-MCNC: 14.4 G/DL
LYMPHOCYTES # BLD AUTO: 1.4 K/UL
LYMPHOCYTES NFR BLD AUTO: 10.3 %
MAN DIFF?: NORMAL
MCHC RBC-ENTMCNC: 29.6 PG
MCHC RBC-ENTMCNC: 31.9 GM/DL
MCV RBC AUTO: 92.8 FL
MONOCYTES # BLD AUTO: 1.5 K/UL
MONOCYTES NFR BLD AUTO: 11.1 %
NEUTROPHILS # BLD AUTO: 10.54 K/UL
NEUTROPHILS NFR BLD AUTO: 77.8 %
PLATELET # BLD AUTO: 329 K/UL
POTASSIUM SERPL-SCNC: 4.4 MMOL/L
RBC # BLD: 4.86 M/UL
RBC # FLD: 14.9 %
SODIUM SERPL-SCNC: 137 MMOL/L
WBC # FLD AUTO: 13.55 K/UL

## 2024-02-08 PROBLEM — N39.0 URINARY TRACT INFECTION, SITE NOT SPECIFIED: Chronic | Status: INACTIVE | Noted: 2023-12-11 | Resolved: 2024-01-08

## 2024-02-12 ENCOUNTER — APPOINTMENT (OUTPATIENT)
Dept: SURGERY | Facility: CLINIC | Age: 84
End: 2024-02-12

## 2024-02-13 NOTE — HISTORY OF PRESENT ILLNESS
[de-identified] : Ms Bermeo here for C1D2 Feraheme IV iron infusion.  During observation period she began to rigor, audible wheezing, & nausea.  She also c/o difficulty breathing.  She denied allergies, hives/urticaria, chest pain, back pains.  PO2 low 80's% Lungs b/l diffuse wheeze, HR rapid.  We gave rescue medications, albuterol nebulizer, EpiPen and called EMS.  She stabilized, alert and oriented and was transported by ambulance to [facility]

## 2024-03-06 ENCOUNTER — APPOINTMENT (OUTPATIENT)
Dept: PULMONOLOGY | Facility: CLINIC | Age: 84
End: 2024-03-06

## 2024-03-19 NOTE — ED ADULT NURSE NOTE - NS ED NURSE LEVEL OF CONSCIOUSNESS ORIENTATION
Patient Education:  Post PCI, Risk Factors, Recovery, Prevention for Future, Lifestyle Changes     Met with patient and wife to review information relating to current diagnosis. Educational information packet given on following topics as related to patient to take home:     1. CAD & Coronary Stents- A&P, cardiac cath, equipment used, heart anatomy  2. HTN- what is blood pressure, normals, how to manage BP/lifestyle changes. Has a cuff, daily log provided.   3. HLD-cholesterol, types, where it comes from, nutrition counseling. Levels reviewed.  4. Diabetes Education- discussed cardiovascular effects, packet given for Hospital Education classes. Pt interested, referral faxed.  5. Smoking cessation- nicotine effects on body and stents, how to quit, tobacco treatment center brochure given. N/A former  6. Active lifestyle suggestions- why activity and exercise are recommended, suggestions to start, Cardiac Rehabilitation benefits. Plans to attend.   7. Healthy eating- tips to help with Blood pressure and cholesterol management, eating regular meals, alternative food choices.  8. Stress management techniques  9. Post hospital follow up visit with PCP and cardiology  10. Reviewed medications- aspirin, P2Y12, beta blockers, statins- what they are indicated for and importance of compliance. 7day AM/PM medication planner given to patient. Patient information card given to patient to keep in wallet or pocket for record keeping on medical history, doctor names and numbers, medication list.     Uninterrupted DAPT emphasized.     Handouts given in packet, some information discussed. Questions answered. Patient verbalized understanding. Encouraged patient to take information home to read and review. Office number left with patient and encouraged to call with questions he may have about information reviewed.     Time spent with patient 30 minutes.     SG Calero, CV-BC, RN  RN Navigator - Cath/PCI/STEMI  [facility] 
Pt very interested in our OP DM education classes, referral faxed to [facility] location per pt request.    ENRIQUE CaleroN, CV-BC, RN  RN Navigator - Cath/PCI/STEMI  [facility]    
Oriented - self; Oriented - place; Oriented - time

## 2024-03-27 ENCOUNTER — NON-APPOINTMENT (OUTPATIENT)
Age: 84
End: 2024-03-27

## 2024-04-03 ENCOUNTER — APPOINTMENT (OUTPATIENT)
Dept: PULMONOLOGY | Facility: CLINIC | Age: 84
End: 2024-04-03
Payer: MEDICARE

## 2024-04-03 VITALS
HEART RATE: 54 BPM | WEIGHT: 120 LBS | DIASTOLIC BLOOD PRESSURE: 98 MMHG | OXYGEN SATURATION: 99 % | RESPIRATION RATE: 15 BRPM | BODY MASS INDEX: 21.26 KG/M2 | TEMPERATURE: 98.5 F | SYSTOLIC BLOOD PRESSURE: 178 MMHG | HEIGHT: 63 IN

## 2024-04-03 DIAGNOSIS — T17.500A UNSPECIFIED FOREIGN BODY IN BRONCHUS CAUSING ASPHYXIATION, INITIAL ENCOUNTER: ICD-10-CM

## 2024-04-03 DIAGNOSIS — J40 BRONCHITIS, NOT SPECIFIED AS ACUTE OR CHRONIC: ICD-10-CM

## 2024-04-03 DIAGNOSIS — J18.9 PNEUMONIA, UNSPECIFIED ORGANISM: ICD-10-CM

## 2024-04-03 PROCEDURE — ZZZZZ: CPT

## 2024-04-03 PROCEDURE — 99214 OFFICE O/P EST MOD 30 MIN: CPT | Mod: 25

## 2024-04-03 PROCEDURE — 94729 DIFFUSING CAPACITY: CPT | Mod: 26

## 2024-04-03 PROCEDURE — 94729 DIFFUSING CAPACITY: CPT | Mod: TC

## 2024-04-03 PROCEDURE — 94727 GAS DIL/WSHOT DETER LNG VOL: CPT | Mod: TC

## 2024-04-03 PROCEDURE — 71046 X-RAY EXAM CHEST 2 VIEWS: CPT | Mod: 26

## 2024-04-03 PROCEDURE — 94010 BREATHING CAPACITY TEST: CPT | Mod: 26

## 2024-04-03 PROCEDURE — 71046 X-RAY EXAM CHEST 2 VIEWS: CPT

## 2024-04-03 PROCEDURE — 94010 BREATHING CAPACITY TEST: CPT | Mod: TC

## 2024-04-03 NOTE — HISTORY OF PRESENT ILLNESS
[Never] : never [Stable] : are stable [None] : ~He/She~ has no significant interval events [Difficulty Breathing During Exertion] : denies dyspnea on exertion [Feelings Of Weakness On Exertion] : denies exercise intolerance [Cough] : denies coughing [Wheezing] : denies wheezing [Regional Soft Tissue Swelling Both Lower Extremities] : denies lower extremity edema [Chest Pain Or Discomfort] : denies chest pain [Fever] : denies fever [TextBox_4] : post dx pneumonia  post emergency hernia  surgery    Chart review subsequent [facility] transition of [facility] 1/27/24 told pleurisy and coronavirus tylenol drip completed prednisone po s/p fall slip at home fx ORTHO placed in splint but states no fx  Dec 15 2023 [facility] A FIB  followed  fever post procedure Fe infusion with RTX management Dr Ritter Admission [facility] Allergic reaction shortness of breath Chest x-ray January 9, 2024 Cardiomegaly Left pleural effusion Reported left hemidiaphragm obscured likely effusion/atelectasis Clinical indication wheezing Loop recorder identified Addendum Chart review Admission Atrial fibrillation regula rate control Acute hypoxemic respiratory failure Underlying history of COPD asthma 83-year-old female past medical hypertension hyperlipidemia coronary artery disease history of PCI COPD Hashimoto's thyroiditis spinal stenosis TIAs paroxysmal atrial fibrillation Raghav was concerned that she was having an allergic reaction receiving an iron infusion as per the second time She felt increased work of breathing and shortness of breath Received epi and Solu-Medrol with some resultant shaking for a few minutes Noted to be in atrial fibrillation Positive low-grade fever temperature 100.3 Hemodynamically stable blood pressure 121/69 O2 saturations 3 L Venturi mask 97% Respiratory exam did not report a cough wheeze but positive shortness of breath Reported clear lungs Cardiac S1-S2 irregularly irregular Laboratory data WBC 15.68 Hemoglobin hematocrit 48.3 platelets 392,000 Serum potassium 3.8 Creatinine 0.58 Serum calcium 9.0 Mild elevation liver enzymes  ALT 52 Alkaline phosphatase 224 Total bilirubin normal Venous blood gas pH is initially 7.22 follow-up 7.39 Lactate one 3.5 follow-up 1.7 Troponin 2019 and 24 normal range Noted in body of history physical chest x-ray bilateral perihilar interstitial opacities felt secondary to mild pulmonary edema with small left pleural effusion EKG atrial fibrillation Overall impression Atrial fibrillation rapid ventricular response new Treatment included diltiazem 60 mg by mouth 20 mg IV At home had been on metoprolol 50 mg twice daily Xarelto on hold Heparin drip Cardiology consultation and loop recorder interrogation integration Acute respiratory failure Rule out secondary to pulmonary edema Treatments included DuoNeb epinephrine Benadryl Pepcid Treatment protocol with antibiotic Rocephin Zithromax Emergency department did report wheezing on exam not noted in the body of the report reviewed Treatment including DuoNeb and 40 mg prednisone daily oxygen to be weaned Admission date January 8 discharged January 14, 2024 Patient was admitted with atrial fibrillation Acute hypoxemic respiratory tract failure secondary to underlying COPD asthma Patient will be discharged on 75 mg of Lopressor twice daily Acute respiratory failure with hypoxemia Chest x-ray demonstrated bilateral perihilar interstitial opacities most consistent with pulmonary edema small left effusion Treatment it did include DuoNeb Rocephin and Zithromax with interval improvement Asthma component as noted treatment above Iron deficiency anemia Hypertension Hypothyroidism on levothyroxine 75 mcg daily Hyperlipidemia Crestor 10 mg daily Chest x-ray January 8, 2024 Cardiomegaly with left pleural effusion  states cardiac off lasix sec low Na but at present OFF HCTZ Lisinpril 20 mg BID  Metoprol 25 BID  Norvasc 2.5  post visit Cardiology [facility] no edema  GI virus management with Dr Frost HEME noted completed EGD and capsule study  Anemia (285.9) (D64.9) 81yo F with HTN, HLD, hypothyroidism here for further management of iron deficiency anemia.   Noted Renal Dr Rico to manage hyponatremia notes off diuretic some inc SOB pos croupy cough pos mucous green no fever chills  noted per cardiac HGB 10.0 seen GI Héctor Frost recommended endoscopy but at present at [facility] ECHO at [facility]  9/26/2 EF > 75 % no reported valve disease or Pul HTN  Post hospitalization [facility] 10/30/2022 Get MRI told it was possible TIA a and was placed on Plavix Follow-up neurology cardiology renal noted that she was off her diuretic and the serum sodium was 135 in the hospital pos SOB DODGE HTN  rx meds Hydralazine lisinopril Metoprolol  [facility] review of imaging studies March 16, 2022 CT pelvis No fractures Lumbar disc disease Moderate to severe spinal stenosis CT cervical spine and head negative X-ray of hip March 14, 2022 no displaced fracture No chest x-ray reviewed available for review  Discharge medication reviewed Aspirin 81 mg Density 200 mg 1 deficit Zofran Pantoprazole 40 mg Hydralazine 50 mg 3 times daily As needed Xanax Gabapentin 600 mg 3 times daily Lisinopril 20 mg twice daily Metoprolol 25 mg twice daily Was was also treated with nitrofurantoin   completed PFIZER  COVID vaccine [Wt Gain ___ Lbs] : no recent weight gain [Wt Loss ___ Lbs] : no recent weight loss [Oxygen] : the patient uses no supplemental oxygen

## 2024-04-03 NOTE — CONSULT LETTER
[Dear  ___] : Dear  [unfilled], [Courtesy Letter:] : I had the pleasure of seeing your patient, [unfilled], in my office today. [Please see my note below.] : Please see my note below. [Sincerely,] : Sincerely, [FreeTextEntry3] : Bharat Arguelles D.O., JYOTI\par   of Medicine\par  [facility] [employer]\par

## 2024-04-03 NOTE — PROCEDURE
[FreeTextEntry1] : Chest x-ray report reviewed March 22, 2024 Cardiomegaly Left lower lobe infiltrate pleural effusion Repeat chest x-ray reported March 27, 2024 persistent infiltrate left lower lobe  Hospital data reviewed Renal function normal BUN 18 creatinine 0.50 CBC WBC 6.52 hemoglobin 11.4 hematocrit 34.9  PFT April 3, 2024 Mild obstructive ventilatory impairment Lung labs are normal No air-trapping Diffusion normal range 76% predicted Hemoglobin 14.4 Data comparison to January 31, 2024 demonstrates significant interval improvement at the total point capacity and diffusion with stable FEV1  Chest x-ray PA lateral April 3, 2024 Cardiac size borderline enlarged Mild scoliosis There is no evidence for parenchymal infiltrates pleural effusion at the left lower lobe Right lung is clear Evidence of a loop recorder Overall impression no evidence for pneumonia or pleural effusion as previously reported Demonstrated resolution  Repeat chest x-ray February 2, 2024 1 view PA Cardiac size enlarged Scoliosis Loop recorder No pneumothorax no interval change No evidence for pleural effusion   PFT 1/31/24  mild reduction flow  rates  Mild OAD  TLC 78 %  DL:CO 65 %  mild combined restrictive and obstructive ventilatory impairment with mild  gas  exchange impairment HGB 12.8  CXR PA LAT 1/31/24  cardiomegaly  scoliosis  loop recorder  LLL no evidence pleural effusion or consolidation as noted reported  below  Most up to date imaging [facility] chest x-ray January 27, 2024 Left pleural effusion with left lower lobe airspace disease rule out pneumonia in appropriate clinical setting Follow-up CT chest angiogram January 22, 2024 Moderate left and small right pleural effusion Underlying pulmonary opacities attributed to atelectasis Small pericardial effusion Multivessel coronary artery disease Small hiatal hernia Negative adenopathy  /  PFT May 24, 2023 Mild restrictive ventilatory impairment Very mild obstructive component with FEV1 FVC ratio 76 No air trapping Diffusion with mild borderline moderate reduction 61% predicted loss functioning alveolar capillary units Hemoglobin 12.4 Data comparison to prior PFT September 7, 2022 there is decline at the TLC and minimal decline at the diffusion with stable FEV1 Clinical correlation Patient asymptomatic Follow-up 3 months if continued decline of pulmonary function testing evaluate CT chest  [place] No BD 1/9/23 mild reduction flow rates  ratio 66 Mild OAD  [place] No BD 10/7/22 severe reduction flow rates  pos decline HGB 7.0  X-ray PA lateral October 7, 2022 indication chest congestion Borderline cardiomegaly Mild calcification aortic knob No parenchymal infiltrates pleural effusions dominant pulmonary nodules Thinning vertebral  spine Mild increased retrosternal airspace on lateral view No gross interval change compared to chest x-ray of July 2022  PFT 9/7/2022 Moderate OAD Lung Volumes nl DLCO 66 % with mild loss fx  alveolar  capillary units  HGB 12.0 NIOX  8  ppb WNL 9/7/2022 stable pulmonary physiology  PFT June 8, 2022 Mild obstructive ventilatory impairment Significant 25% response to bronchodilator at the FEV1 Lung volumes normal Total lung capacity 89% predicted Diffusion low normal range 70% predicted Hemoglobin 10.7 Data comparison to April 22, 2022 demonstrates stable FVC with just greater than 200 cc interval improvement at the FEV1  Chest x-ray PA lateral june 8 2022 Borderline cardiomegaly Right lateral scoliosis Lung fields are clear No evidence of cephalization or congestion No interstitial changes Costophrenic angles are clear No recurrence of pleural effusion  Pulmonary exercise study 6/8/22 RA study  neg desaturation  Chest x-ray PA lateral May 6, 2022 Cardiomegaly Right lung is clear No evidence for a right pleural effusion Left costophrenic angle blunted with some component of atelectasis Comparison to chest x-ray of April 22, 2022 which demonstrated a very minimal scar atelectatic changes with possible left pleural effusion although differences in technique does suggest some level of improvement clinical correlate with exam with no crackles or auscultated at today's visit  Chest x-ray PA lateral April 22, 2022 Blunting left costophrenic angle Patient's x-ray was done with overlying draw Difficult to assess No clear parenchymal infiltrate or pleural effusion Rule out increase shortness of breath secondary to congestive heart failure The left pleural effusion is new Compared to a chest x-ray of January 13, 2021  NAHUM 4/22/22 Mod severe OAD  PFT 10/20/21 Flow rates nl  minimal OAD ratio 78 Lung Volumes nl  DLCO  78 % HGB 11.5  PFT 4/15/21 Mild  borderline  moderate  OAD Lung volumes normal range DLCO  83 % HGGB 10.4 NIOX  12  Nl range  4/15/21  PFT 1/13/21 mild  mod reduction flow rates Moderate OAD Normal lung volumes Normal DLCO 79 % pred noted mild decline pulmonary physiology  X-ray PA lateral projection January 13, 2021 Borderline cardiomegaly Right lateral scoliosis Lung fields otherwise clear without parenchymal infiltrate pleural effusion or dominant pulmonary nodules Soft tissue bony structures otherwise grossly unremarkable Impression clear lungs no gross evidence for rib fractures or infiltrates No interval change compared to chest x-ray July 15, 2020  Chest x-ray PA lateral July 15, 2020 Borderline cardiomegaly Hyperaeration Clear lungs No parenchymal infiltrate pleural effusions or dominant pulmonary nodules No interval change compared to chest x-ray of December 20, 2019  PFT Body BOX July 10 2020 mild OAD  No BD at FEV1 Normal lung volumes  sp conductance and resistance normal Diffusion 80 % pred normal  HGB 13.0   Spirometry March 9, 2020 Mild obstructive ventilatory impairment No bronchodilator response at FEV1 Stable FEV1  Chest x-ray PA lateral December 20, 2019 Normal cardiac size Mild right lateral scoliosis Suggestion of some bronchiectatic changes at right lower lung zone This is unchanged compared to the recent chest x-ray of December 16, 2019 No consolidation consistent with active pneumonia  States Flu vaccination up-to-date History of pneumococcal vaccination and Prevnar administered  PFT December 16, 2019 Mild obstructive ventilatory impairment No response to bronchodilator at the FEV1 Lung volumes are normal with total capacity 95% predicted. Diffusion relatively normal 78% of predicted. Hemoglobin 13.7  Chest x-ray PA lateral December 16 2019 Mild cardiomegaly Left lower lung zone minimal discoid linear atelectatic change No parenchymal infiltrates pleural effusions dominant pulmonary nodules Soft tissue bony structures grossly normal Impression no evidence of pneumonia  Data review 12/21/2019 Serum sodium 134 with serum chloride 93 normal renal function CBC White blood count 7.37 hemoglobin 14.3 hematocrit 43.4 Procalcitonin negative RVP positive influenza A  CT chest December 26, 2019 Right mid lower lung zone tree-in-bud with centrilobular groundglass nodular opacities most consistent with impacted distal airways  Blood draw  HD Flu vaccine 9/7/2022

## 2024-04-03 NOTE — PHYSICAL EXAM
[General Appearance - Well Developed] : well developed [Well Groomed] : well groomed [Normal Appearance] : normal appearance [General Appearance - Well Nourished] : well nourished [No Deformities] : no deformities [General Appearance - In No Acute Distress] : no acute distress [Normal Oropharynx] : normal oropharynx [I] : I [Neck Appearance] : the appearance of the neck was normal [Neck Cervical Mass (___cm)] : no neck mass was observed [Jugular Venous Distention Increased] : there was no jugular-venous distention [Thyroid Diffuse Enlargement] : the thyroid was not enlarged [Heart Rate And Rhythm] : heart rate and rhythm were normal [Heart Sounds] : normal S1 and S2 [Murmurs] : no murmurs present [Arterial Pulses Normal] : the arterial pulses were normal [Edema] : no peripheral edema present [Veins - Varicosity Changes] : no varicosital changes were noted in the lower extremities [Respiration, Rhythm And Depth] : normal respiratory rhythm and effort [Exaggerated Use Of Accessory Muscles For Inspiration] : no accessory muscle use [Chest Palpation] : palpation of the chest revealed no abnormalities [Lungs Percussion] : the lungs were normal to percussion [Bowel Sounds] : normal bowel sounds [Abdomen Soft] : soft [Abdomen Tenderness] : non-tender [Abdomen Mass (___ Cm)] : no abdominal mass palpated [Nail Clubbing] : no clubbing of the fingernails [Abnormal Walk] : normal gait [Cyanosis, Localized] : no localized cyanosis [Petechial Hemorrhages (___cm)] : no petechial hemorrhages [Skin Color & Pigmentation] : normal skin color and pigmentation [] : no rash [No Venous Stasis] : no venous stasis [Skin Lesions] : no skin lesions [No Skin Ulcers] : no skin ulcer [No Xanthoma] : no  xanthoma was observed [Sensation] : the sensory exam was normal to light touch and pinprick [Deep Tendon Reflexes (DTR)] : deep tendon reflexes were 2+ and symmetric [No Focal Deficits] : no focal deficits [Oriented To Time, Place, And Person] : oriented to person, place, and time [Impaired Insight] : insight and judgment were intact [Mood] : the mood was normal [Affect] : the affect was normal [FreeTextEntry1] : coarse BS

## 2024-04-03 NOTE — DISCUSSION/SUMMARY
[FreeTextEntry1] : Post hospital interval improvement effusion atlectasis pneumonia pleurisy  Noted to follow-up chest x-ray and pulmonary physiology is unremarkable There is no evidence for pneumonia or pleural effusion on the most up-to-date film  Abnl PFT with decline  at [facility]  f/u EP cardiology  Still with pleuritic type of pain on anticoagulation Low clinical suspicion for PE Will check D-dimer for blood work for completeness Prednisone 40 mg with a 10 mg q. taper   r/o sec  anemia on Plavix and ASP anemia w/u possible HEME  GI   post hyponatremia per cardiology on daily lasix 40 mg "Impaction distal bronchi Obstructive airway disease Mild decline pulmonary physiology- restart Trelegy Elipita- sample dose 200 mcg dose Post TIA on Plavix management neurology follow-up cardiology Other history as noted above Vaccination and pneumococcal profile up to date Advised if needs to use should notify us for evaluation [facility] cancer screening F/U Héctor Frost Colonoscopyendoscopy up to  date Spinal disease   PFIZER  completed X 3 management of boosters including:  valent variant Flu  vaccine up to date  cardiology  management of blood pressure Based historically on the severe low sodium with syncope patient is hesitant to restart any diuretic even with the noted small left pleural effusion above Will renew Xanax Advised if still not feeling well report immediately to the emergency department at [place] or [place] for reevaluation Discussed with PMD  with holding of diuretic pos  decline of pulmonary fx off resp controller therapy  as noted above RS

## 2024-04-23 NOTE — PHYSICAL THERAPY INITIAL EVALUATION ADULT - GAIT TRAINING, PT EVAL
Please advise parent that at this time our clinic is not using Therasis. If parent would like this testing, it can be requested through his primary care pediatrician.    Pt will ambulate with a RW for 100 ft with RW cgA x 1 3-5 days

## 2024-05-09 ENCOUNTER — OUTPATIENT (OUTPATIENT)
Dept: OUTPATIENT SERVICES | Facility: HOSPITAL | Age: 84
LOS: 1 days | Discharge: HOME | End: 2024-05-09

## 2024-05-09 DIAGNOSIS — D50.9 IRON DEFICIENCY ANEMIA, UNSPECIFIED: ICD-10-CM

## 2024-05-15 ENCOUNTER — APPOINTMENT (OUTPATIENT)
Dept: PULMONOLOGY | Facility: CLINIC | Age: 84
End: 2024-05-15
Payer: MEDICARE

## 2024-05-15 VITALS — SYSTOLIC BLOOD PRESSURE: 144 MMHG | OXYGEN SATURATION: 96 % | HEART RATE: 103 BPM | DIASTOLIC BLOOD PRESSURE: 82 MMHG

## 2024-05-15 DIAGNOSIS — R94.2 ABNORMAL RESULTS OF PULMONARY FUNCTION STUDIES: ICD-10-CM

## 2024-05-15 DIAGNOSIS — R07.89 OTHER CHEST PAIN: ICD-10-CM

## 2024-05-15 DIAGNOSIS — R09.1 PLEURISY: ICD-10-CM

## 2024-05-15 DIAGNOSIS — J90 PLEURAL EFFUSION, NOT ELSEWHERE CLASSIFIED: ICD-10-CM

## 2024-05-15 PROCEDURE — 99215 OFFICE O/P EST HI 40 MIN: CPT | Mod: 25

## 2024-05-15 PROCEDURE — 94010 BREATHING CAPACITY TEST: CPT

## 2024-05-15 PROCEDURE — 94729 DIFFUSING CAPACITY: CPT | Mod: 26

## 2024-05-15 PROCEDURE — ZZZZZ: CPT

## 2024-05-15 PROCEDURE — 71046 X-RAY EXAM CHEST 2 VIEWS: CPT | Mod: 26

## 2024-05-15 PROCEDURE — 94727 GAS DIL/WSHOT DETER LNG VOL: CPT | Mod: TC

## 2024-05-15 PROCEDURE — 36415 COLL VENOUS BLD VENIPUNCTURE: CPT

## 2024-05-16 ENCOUNTER — APPOINTMENT (OUTPATIENT)
Dept: HEMATOLOGY ONCOLOGY | Facility: CLINIC | Age: 84
End: 2024-05-16

## 2024-05-16 NOTE — CONSULT LETTER
[Dear  ___] : Dear  [unfilled], [Courtesy Letter:] : I had the pleasure of seeing your patient, [unfilled], in my office today. [Please see my note below.] : Please see my note below. [Sincerely,] : Sincerely, [FreeTextEntry3] : Bharat Arguelles D.O., JYOTI\par   of Medicine\par  [facility] [employer]\par

## 2024-05-16 NOTE — HISTORY OF PRESENT ILLNESS
[Never] : never [Stable] : are stable [None] : ~He/She~ has no significant interval events [Difficulty Breathing During Exertion] : denies dyspnea on exertion [Feelings Of Weakness On Exertion] : denies exercise intolerance [Cough] : denies coughing [Wheezing] : denies wheezing [Regional Soft Tissue Swelling Both Lower Extremities] : denies lower extremity edema [Chest Pain Or Discomfort] : denies chest pain [Fever] : denies fever [TextBox_4] : s/p [facility] 2 weeks  ago  PPM completed   told HB pause  post procedure  fatigue cough  deep breath at center of chest  was seen at f/u per  cardiology did not feel it was  cardiac pain Was placed on O2 sec SOB at Rehab Told O2 sats " OK "  this  am upon awakening RA 85 % at Rehab on Nebulizer QID noted late Feb hernia surgery noted pos Pseud bladder and placed on Meropenem   post dx pneumonia  post emergency hernia  surgery    Chart review subsequent [facility] 1/27/24 told pleurisy and coronavirus tylenol drip completed prednisone po s/p fall slip at home fx ORTHO placed in splint but states no fx  Dec 15 2023 SF A FIB  followed  fever post procedure Fe infusion with RTX management Dr Ritter Admission [facility] Allergic reaction shortness of breath Chest x-ray January 9, 2024 Cardiomegaly Left pleural effusion Reported left hemidiaphragm obscured likely effusion/atelectasis Clinical indication wheezing Loop recorder identified Addendum Chart review Admission Atrial fibrillation regula rate control Acute hypoxemic respiratory failure Underlying history of COPD asthma 83-year-old female past medical hypertension hyperlipidemia coronary artery disease history of PCI COPD Hashimoto's thyroiditis spinal stenosis TIAs paroxysmal atrial fibrillation [place] was concerned that she was having an allergic reaction receiving an iron infusion as per the second time She felt increased work of breathing and shortness of breath Received epi and Solu-Medrol with some resultant shaking for a few minutes Noted to be in atrial fibrillation Positive low-grade fever temperature 100.3 Hemodynamically stable blood pressure 121/69 O2 saturations 3 L Venturi mask 97% Respiratory exam did not report a cough wheeze but positive shortness of breath Reported clear lungs Cardiac S1-S2 irregularly irregular Laboratory data WBC 15.68 Hemoglobin hematocrit 48.3 platelets 392,000 Serum potassium 3.8 Creatinine 0.58 Serum calcium 9.0 Mild elevation liver enzymes  ALT 52 Alkaline phosphatase 224 Total bilirubin normal Venous blood gas pH is initially 7.22 follow-up 7.39 Lactate one 3.5 follow-up 1.7 Troponin 2019 and 24 normal range Noted in body of history physical chest x-ray bilateral perihilar interstitial opacities felt secondary to mild pulmonary edema with small left pleural effusion EKG atrial fibrillation Overall impression Atrial fibrillation rapid ventricular response new Treatment included diltiazem 60 mg by mouth 20 mg IV At home had been on metoprolol 50 mg twice daily Xarelto on hold Heparin drip Cardiology consultation and loop recorder interrogation integration Acute respiratory failure Rule out secondary to pulmonary edema Treatments included DuoNeb epinephrine Benadryl Pepcid Treatment protocol with antibiotic Rocephin Zithromax Emergency department did report wheezing on exam not noted in the body of the report reviewed Treatment including DuoNeb and 40 mg prednisone daily oxygen to be weaned Admission date January 8 discharged January 14, 2024 Patient was admitted with atrial fibrillation Acute hypoxemic respiratory tract failure secondary to underlying COPD asthma Patient will be discharged on 75 mg of Lopressor twice daily Acute respiratory failure with hypoxemia Chest x-ray demonstrated bilateral perihilar interstitial opacities most consistent with pulmonary edema small left effusion Treatment it did include DuoNeb Rocephin and Zithromax with interval improvement Asthma component as noted treatment above Iron deficiency anemia Hypertension Hypothyroidism on levothyroxine 75 mcg daily Hyperlipidemia Crestor 10 mg daily Chest x-ray January 8, 2024 Cardiomegaly with left pleural effusion  states cardiac off lasix sec low Na but at present OFF HCTZ Lisinpril 20 mg BID  Metoprol 25 BID  Norvasc 2.5  post visit Cardiology [facility] no edema  GI virus management with Dr Frost HEME noted completed EGD and capsule study  Anemia (285.9) (D64.9) 81yo F with HTN, HLD, hypothyroidism here for further management of iron deficiency anemia.   Noted Renal Dr Rico to manage hyponatremia notes off diuretic some inc SOB pos croupy cough pos mucous green no fever chills  noted per cardiac HGB 10.0 seen GI Héctor Frost recommended endoscopy but at present at [facility] ECHO at [facility]  9/26/2 EF > 75 % no reported valve disease or Pul HTN  Post hospitalization [facility] 10/30/2022 Get MRI told it was possible TIA a and was placed on Plavix Follow-up neurology cardiology renal noted that she was off her diuretic and the serum sodium was 135 in the hospital pos SOB DODGE HTN  rx meds Hydralazine lisinopril Metoprolol  [facility] review of imaging studies March 16, 2022 CT pelvis No fractures Lumbar disc disease Moderate to severe spinal stenosis CT cervical spine and head negative X-ray of hip March 14, 2022 no displaced fracture No chest x-ray reviewed available for review  Discharge medication reviewed Aspirin 81 mg Density 200 mg 1 deficit Zofran Pantoprazole 40 mg Hydralazine 50 mg 3 times daily As needed Xanax Gabapentin 600 mg 3 times daily Lisinopril 20 mg twice daily Metoprolol 25 mg twice daily Was was also treated with nitrofurantoin   completed PFIZER  COVID vaccine [Wt Gain ___ Lbs] : no recent weight gain [Wt Loss ___ Lbs] : no recent weight loss [Oxygen] : the patient uses no supplemental oxygen

## 2024-05-16 NOTE — DISCUSSION/SUMMARY
[FreeTextEntry1] : Left lower lobe atelectasis consolidation versus pleural effusion post placement of permanent pacemaker [facility] 2 weeks prior These chest x-ray findings are new compared to chest x-ray of April 3 2024 It should be noted that there was a March 27, 2024 x-ray which reported persistent left lower lobe infiltrative findings clearly not identified on a follow-up April 3, 2024 suggesting recurrence Recommendations CT chest Question is whether adding contrast in this high risk patient  Abnl PFT with decline  at [facility]  f/u EP cardiology [facility] Still with pleuritic type of pain on anticoagulation Low clinical suspicion for PE Will check D-dimer for blood work for completeness Prednisone 40 mg with a 10 mg q. taper   r/o sec  anemia on Plavix and ASP anemia w/u possible HEME  GI   post hyponatremia per cardiology on daily lasix 40 mg "Impaction distal bronchi Obstructive airway disease Mild decline pulmonary physiology- restart Trelegy Elipita- sample dose 200 mcg dose Post TIA on Plavix management neurology follow-up cardiology Other history as noted above Vaccination and pneumococcal profile up to date Advised if needs to use should notify us for evaluation [facility] cancer screening F/U Héctor rFost Colonoscopyendoscopy up to  date Spinal disease   PFIZER  completed X 3 management of boosters including:  valent variant Flu  vaccine up to date  cardiology  management of blood pressure Based historically on the severe low sodium with syncope patient is hesitant to restart any diuretic even with the noted small left pleural effusion above Will renew Xanax Advised if still not feeling well report immediately to the emergency department at [place] or [place] for reevaluation Discussed with PMD  with holding of diuretic pos  decline of pulmonary fx off resp controller therapy  as noted above RS

## 2024-05-16 NOTE — REASON FOR VISIT
[Follow-Up] : a follow-up visit [TextBox_44] : s/p pneumonia, chest congestion [FreeTextEntry2] : OAD post Flu

## 2024-05-16 NOTE — PROCEDURE
[FreeTextEntry1] : Chest x-ray PA lateral May 15 2024 Comparison to study of April 3, 2024 interval development of a new left lower lobe opacity consolidation cannot exclude a component of pleural effusion Evidence of a new permanent dual-chamber pacemaker Rule out infectious inflammatory rule out pleural effusion rule out atelectasis Clinical correlation   PFT May 15, 2024 Chief complaint chest discomfort post permanent pacemaker Moderate reduction flow rates Total lung capacity severe reduction 29% predicted Diffusion 31% predicted with severe loss of functioning alveolar capillary units Hemoglobin 11.1 Overall data April 3, 2024 demonstrates a significant decline at the flow rates   Chest x-ray report reviewed March 22, 2024 Cardiomegaly Left lower lobe infiltrate pleural effusion Repeat chest x-ray reported March 27, 2024 persistent infiltrate left lower lobe  Hospital data reviewed Renal function normal BUN 18 creatinine 0.50 CBC WBC 6.52 hemoglobin 11.4 hematocrit 34.9  PFT April 3, 2024 Mild obstructive ventilatory impairment Lung labs are normal No air-trapping Diffusion normal range 76% predicted Hemoglobin 14.4 Data comparison to January 31, 2024 demonstrates significant interval improvement at the total point capacity and diffusion with stable FEV1  Chest x-ray PA lateral April 3, 2024 Cardiac size borderline enlarged Mild scoliosis There is no evidence for parenchymal infiltrates pleural effusion at the left lower lobe Right lung is clear Evidence of a loop recorder Overall impression no evidence for pneumonia or pleural effusion as previously reported Demonstrated resolution  Repeat chest x-ray February 2, 2024 1 view PA Cardiac size enlarged Scoliosis Loop recorder No pneumothorax no interval change No evidence for pleural effusion   PFT 1/31/24  mild reduction flow  rates  Mild OAD  TLC 78 %  DL:CO 65 %  mild combined restrictive and obstructive ventilatory impairment with mild  gas  exchange impairment HGB 12.8  CXR PA LAT 1/31/24  cardiomegaly  scoliosis  loop recorder  LLL no evidence pleural effusion or consolidation as noted reported  below  Most up to date imaging [facility] chest x-ray January 27, 2024 Left pleural effusion with left lower lobe airspace disease rule out pneumonia in appropriate clinical setting Follow-up CT chest angiogram January 22, 2024 Moderate left and small right pleural effusion Underlying pulmonary opacities attributed to atelectasis Small pericardial effusion Multivessel coronary artery disease Small hiatal hernia Negative adenopathy  /  PFT May 24, 2023 Mild restrictive ventilatory impairment Very mild obstructive component with FEV1 FVC ratio 76 No air trapping Diffusion with mild borderline moderate reduction 61% predicted loss functioning alveolar capillary units Hemoglobin 12.4 Data comparison to prior PFT September 7, 2022 there is decline at the TLC and minimal decline at the diffusion with stable FEV1 Clinical correlation Patient asymptomatic Follow-up 3 months if continued decline of pulmonary function testing evaluate CT chest  Nahum No BD 1/9/23 mild reduction flow rates  ratio 66 Mild OAD  Nahum No BD 10/7/22 severe reduction flow rates  pos decline HGB 7.0  X-ray PA lateral October 7, 2022 indication chest congestion Borderline cardiomegaly Mild calcification aortic knob No parenchymal infiltrates pleural effusions dominant pulmonary nodules Thinning vertebral  spine Mild increased retrosternal airspace on lateral view No gross interval change compared to chest x-ray of July 2022  PFT 9/7/2022 Moderate OAD Lung Volumes nl DLCO 66 % with mild loss fx  alveolar  capillary units  HGB 12.0 NIOX  8  ppb WNL 9/7/2022 stable pulmonary physiology  PFT June 8, 2022 Mild obstructive ventilatory impairment Significant 25% response to bronchodilator at the FEV1 Lung volumes normal Total lung capacity 89% predicted Diffusion low normal range 70% predicted Hemoglobin 10.7 Data comparison to April 22, 2022 demonstrates stable FVC with just greater than 200 cc interval improvement at the FEV1  Chest x-ray PA lateral june 8 2022 Borderline cardiomegaly Right lateral scoliosis Lung fields are clear No evidence of cephalization or congestion No interstitial changes Costophrenic angles are clear No recurrence of pleural effusion  Pulmonary exercise study 6/8/22 RA study  neg desaturation  Chest x-ray PA lateral May 6, 2022 Cardiomegaly Right lung is clear No evidence for a right pleural effusion Left costophrenic angle blunted with some component of atelectasis Comparison to chest x-ray of April 22, 2022 which demonstrated a very minimal scar atelectatic changes with possible left pleural effusion although differences in technique does suggest some level of improvement clinical correlate with exam with no crackles or auscultated at today's visit  Chest x-ray PA lateral April 22, 2022 Blunting left costophrenic angle Patient's x-ray was done with overlying draw Difficult to assess No clear parenchymal infiltrate or pleural effusion Rule out increase shortness of breath secondary to congestive heart failure The left pleural effusion is new Compared to a chest x-ray of January 13, 2021  NAHUM 4/22/22 Mod severe OAD  PFT 10/20/21 Flow rates nl  minimal OAD ratio 78 Lung Volumes nl  DLCO  78 % HGB 11.5  PFT 4/15/21 Mild  borderline  moderate  OAD Lung volumes normal range DLCO  83 % HGGB 10.4 NIOX  12  Nl range  4/15/21  PFT 1/13/21 mild  mod reduction flow rates Moderate OAD Normal lung volumes Normal DLCO 79 % pred noted mild decline pulmonary physiology  X-ray PA lateral projection January 13, 2021 Borderline cardiomegaly Right lateral scoliosis Lung fields otherwise clear without parenchymal infiltrate pleural effusion or dominant pulmonary nodules Soft tissue bony structures otherwise grossly unremarkable Impression clear lungs no gross evidence for rib fractures or infiltrates No interval change compared to chest x-ray July 15, 2020  Chest x-ray PA lateral July 15, 2020 Borderline cardiomegaly Hyperaeration Clear lungs No parenchymal infiltrate pleural effusions or dominant pulmonary nodules No interval change compared to chest x-ray of December 20, 2019  PFT Body BOX July 10 2020 mild OAD  No BD at FEV1 Normal lung volumes  sp conductance and resistance normal Diffusion 80 % pred normal  HGB 13.0   Spirometry March 9, 2020 Mild obstructive ventilatory impairment No bronchodilator response at FEV1 Stable FEV1  Chest x-ray PA lateral December 20, 2019 Normal cardiac size Mild right lateral scoliosis Suggestion of some bronchiectatic changes at right lower lung zone This is unchanged compared to the recent chest x-ray of December 16, 2019 No consolidation consistent with active pneumonia  States Flu vaccination up-to-date History of pneumococcal vaccination and Prevnar administered  PFT December 16, 2019 Mild obstructive ventilatory impairment No response to bronchodilator at the FEV1 Lung volumes are normal with total capacity 95% predicted. Diffusion relatively normal 78% of predicted. Hemoglobin 13.7  Chest x-ray PA lateral December 16 2019 Mild cardiomegaly Left lower lung zone minimal discoid linear atelectatic change No parenchymal infiltrates pleural effusions dominant pulmonary nodules Soft tissue bony structures grossly normal Impression no evidence of pneumonia  Data review 12/21/2019 Serum sodium 134 with serum chloride 93 normal renal function CBC White blood count 7.37 hemoglobin 14.3 hematocrit 43.4 Procalcitonin negative RVP positive influenza A  CT chest December 26, 2019 Right mid lower lung zone tree-in-bud with centrilobular groundglass nodular opacities most consistent with impacted distal airways  Blood draw  HD Flu vaccine 9/7/2022

## 2024-05-22 ENCOUNTER — OUTPATIENT (OUTPATIENT)
Dept: OUTPATIENT SERVICES | Facility: HOSPITAL | Age: 84
LOS: 1 days | End: 2024-05-22
Payer: MEDICARE

## 2024-05-22 ENCOUNTER — APPOINTMENT (OUTPATIENT)
Dept: CT IMAGING | Facility: CLINIC | Age: 84
End: 2024-05-22
Payer: MEDICARE

## 2024-05-22 DIAGNOSIS — J90 PLEURAL EFFUSION, NOT ELSEWHERE CLASSIFIED: ICD-10-CM

## 2024-05-22 PROCEDURE — 71250 CT THORAX DX C-: CPT

## 2024-05-22 PROCEDURE — 71250 CT THORAX DX C-: CPT | Mod: 26,MH

## 2024-05-29 ENCOUNTER — NON-APPOINTMENT (OUTPATIENT)
Age: 84
End: 2024-05-29

## 2024-06-06 ENCOUNTER — APPOINTMENT (OUTPATIENT)
Dept: CT IMAGING | Facility: CLINIC | Age: 84
End: 2024-06-06

## 2024-07-03 ENCOUNTER — APPOINTMENT (OUTPATIENT)
Dept: PULMONOLOGY | Facility: CLINIC | Age: 84
End: 2024-07-03

## 2024-07-03 VITALS — OXYGEN SATURATION: 97 % | DIASTOLIC BLOOD PRESSURE: 74 MMHG | HEART RATE: 59 BPM | SYSTOLIC BLOOD PRESSURE: 165 MMHG

## 2024-07-17 ENCOUNTER — APPOINTMENT (OUTPATIENT)
Dept: PULMONOLOGY | Facility: CLINIC | Age: 84
End: 2024-07-17
Payer: MEDICARE

## 2024-07-17 VITALS — SYSTOLIC BLOOD PRESSURE: 154 MMHG | HEART RATE: 58 BPM | DIASTOLIC BLOOD PRESSURE: 68 MMHG | OXYGEN SATURATION: 96 %

## 2024-07-17 DIAGNOSIS — R94.2 ABNORMAL RESULTS OF PULMONARY FUNCTION STUDIES: ICD-10-CM

## 2024-07-17 DIAGNOSIS — J44.9 CHRONIC OBSTRUCTIVE PULMONARY DISEASE, UNSPECIFIED: ICD-10-CM

## 2024-07-17 PROCEDURE — 71046 X-RAY EXAM CHEST 2 VIEWS: CPT

## 2024-07-17 PROCEDURE — 99214 OFFICE O/P EST MOD 30 MIN: CPT | Mod: 25

## 2024-07-17 PROCEDURE — 94010 BREATHING CAPACITY TEST: CPT

## 2024-07-30 ENCOUNTER — LABORATORY RESULT (OUTPATIENT)
Age: 84
End: 2024-07-30

## 2024-07-30 ENCOUNTER — APPOINTMENT (OUTPATIENT)
Dept: INTERNAL MEDICINE | Facility: CLINIC | Age: 84
End: 2024-07-30
Payer: MEDICARE

## 2024-07-30 VITALS — DIASTOLIC BLOOD PRESSURE: 66 MMHG | SYSTOLIC BLOOD PRESSURE: 138 MMHG

## 2024-07-30 VITALS
TEMPERATURE: 97.8 F | DIASTOLIC BLOOD PRESSURE: 78 MMHG | HEIGHT: 63 IN | BODY MASS INDEX: 23.04 KG/M2 | SYSTOLIC BLOOD PRESSURE: 140 MMHG | HEART RATE: 60 BPM | OXYGEN SATURATION: 96 % | WEIGHT: 130 LBS

## 2024-07-30 DIAGNOSIS — I48.0 PAROXYSMAL ATRIAL FIBRILLATION: ICD-10-CM

## 2024-07-30 DIAGNOSIS — I25.10 ATHEROSCLEROTIC HEART DISEASE OF NATIVE CORONARY ARTERY W/OUT ANGINA PECTORIS: ICD-10-CM

## 2024-07-30 DIAGNOSIS — E03.9 HYPOTHYROIDISM, UNSPECIFIED: ICD-10-CM

## 2024-07-30 DIAGNOSIS — I10 ESSENTIAL (PRIMARY) HYPERTENSION: ICD-10-CM

## 2024-07-30 DIAGNOSIS — M48.07 SPINAL STENOSIS, LUMBOSACRAL REGION: ICD-10-CM

## 2024-07-30 DIAGNOSIS — Z00.00 ENCOUNTER FOR GENERAL ADULT MEDICAL EXAMINATION W/OUT ABNORMAL FINDINGS: ICD-10-CM

## 2024-07-30 PROCEDURE — 99214 OFFICE O/P EST MOD 30 MIN: CPT

## 2024-07-30 PROCEDURE — G2211 COMPLEX E/M VISIT ADD ON: CPT

## 2024-07-30 PROCEDURE — 36415 COLL VENOUS BLD VENIPUNCTURE: CPT

## 2024-07-30 RX ORDER — ASPIRIN 81 MG
81 TABLET, DELAYED RELEASE (ENTERIC COATED) ORAL DAILY
Qty: 90 | Refills: 0 | Status: ACTIVE | COMMUNITY

## 2024-07-30 RX ORDER — HYDRALAZINE HYDROCHLORIDE 50 MG/1
50 TABLET ORAL 3 TIMES DAILY
Qty: 180 | Refills: 0 | Status: ACTIVE | COMMUNITY

## 2024-07-30 RX ORDER — RIVAROXABAN 15 MG/1
15 TABLET, FILM COATED ORAL
Qty: 90 | Refills: 1 | Status: ACTIVE | COMMUNITY

## 2024-07-30 RX ORDER — METOPROLOL SUCCINATE 100 MG/1
100 TABLET, EXTENDED RELEASE ORAL
Qty: 90 | Refills: 1 | Status: ACTIVE | COMMUNITY

## 2024-07-30 NOTE — HEALTH RISK ASSESSMENT

## 2024-07-30 NOTE — PLAN
[FreeTextEntry1] : HTN - BP today is controlled. Continue Hydralazine, Lisinopril, and Metoprolol.  CAD / Afib - stable, followed by cardiology. Continue Crestor. Check fasting lipid panel and A1c. She is scheduled for cardiac ablation in 10/24 with Dr. Barr.  Hypothyroidism - check TFT's and continue Synthroid 75 mcg daily. Anemia - check CBC. Followed with hematology Dr. Ritter. Recheck CBC. Lumbosacral stenosis - controlled on Gabapentin 600 mg BID.  RTO 6 months for physical.

## 2024-07-30 NOTE — PHYSICAL EXAM
[Normal] : normal rate, regular rhythm, normal S1 and S2 and no murmur heard [No Edema] : there was no peripheral edema [Soft] : abdomen soft [Non Tender] : non-tender [Non-distended] : non-distended [No Rash] : no rash [Normal Gait] : normal gait [Normal Mood] : the mood was normal [de-identified] : friendly [de-identified] : BLE varicosities L>R

## 2024-07-30 NOTE — HISTORY OF PRESENT ILLNESS
[FreeTextEntry1] : Follow up [de-identified] : Patient comes for follow up visit. Last seen 1 yr ago. She had a physical with Dr. Clive Messer in [place] in 10/23. She prefers to stay in our office for her medical care.   She has hx of HTN, CAD s/p PCI x 2, hypothyroidism, and TIA. She reports feeling well today. No new complaints. She was diagnosed with Afib while hospitalized in 12/23. She is now on Xarelto. She is followed with cardiology Dr. Rey Shin at [place]. She is scheduled for cardiac ablation in 10/24 with EP Dr. Donovan Barr at [place]. She was hospitalized at [place] for N/V and chest pain in 2/24. Found to have strangulated right inguinal hernia and underwent emergent surgery. After surgery, [facility] [place]. Her BP meds changed from Amlodipine to Hydralazine due to LE edema. Metoprolol switched to succinate.

## 2024-07-30 NOTE — HISTORY OF PRESENT ILLNESS
[FreeTextEntry1] : Follow up [de-identified] : Patient comes for follow up visit. Last seen 1 yr ago. She had a physical with Dr. Clive Messer in [place] in 10/23. She prefers to stay in our office for her medical care.   She has hx of HTN, CAD s/p PCI x 2, hypothyroidism, and TIA. She reports feeling well today. No new complaints. She was diagnosed with Afib while hospitalized in 12/23. She is now on Xarelto. She is followed with cardiology Dr. Rey Shin at [place]. She is scheduled for cardiac ablation in 10/24 with EP Dr. Donovan Barr at [place]. She was hospitalized at [place] for N/V and chest pain in 2/24. Found to have strangulated right inguinal hernia and underwent emergent surgery. After surgery, [facility] [place]. Her BP meds changed from Amlodipine to Hydralazine due to LE edema. Metoprolol switched to succinate.

## 2024-07-30 NOTE — HEALTH RISK ASSESSMENT
[Excellent] : ~his/her~  mood as  excellent [No] : In the past 12 months have you used drugs other than those required for medical reasons? No [No falls in past year] : Patient reported no falls in the past year [0] : 2) Feeling down, depressed, or hopeless: Not at all (0) [PHQ-2 Negative - No further assessment needed] : PHQ-2 Negative - No further assessment needed [Never] : Never [None] : None [With Family] : lives with family [Retired] : retired [] :  [# Of Children ___] : has [unfilled] children [Fully functional (bathing, dressing, toileting, transferring, walking, feeding)] : Fully functional (bathing, dressing, toileting, transferring, walking, feeding) [Fully functional (using the telephone, shopping, preparing meals, housekeeping, doing laundry, using] : Fully functional and needs no help or supervision to perform IADLs (using the telephone, shopping, preparing meals, housekeeping, doing laundry, using transportation, managing medications and managing finances) [Smoke Detector] : smoke detector [Carbon Monoxide Detector] : carbon monoxide detector [Seat Belt] :  uses seat belt [Sunscreen] : uses sunscreen [[id number]] : 0 [Sexually Active] : not sexually active [High Risk Behavior] : no high risk behavior [Reports changes in hearing] : Reports no changes in hearing [Reports changes in vision] : Reports no changes in vision [Reports changes in dental health] : Reports no changes in dental health

## 2024-07-30 NOTE — PHYSICAL EXAM
[Normal] : normal rate, regular rhythm, normal S1 and S2 and no murmur heard [No Edema] : there was no peripheral edema [Soft] : abdomen soft [Non Tender] : non-tender [Non-distended] : non-distended [No Rash] : no rash [Normal Gait] : normal gait [Normal Mood] : the mood was normal [de-identified] : friendly [de-identified] : BLE varicosities L>R

## 2024-07-31 LAB
25(OH)D3 SERPL-MCNC: 47.3 NG/ML
ALBUMIN SERPL ELPH-MCNC: 4.4 G/DL
ALP BLD-CCNC: 96 U/L
ALT SERPL-CCNC: 13 U/L
ANION GAP SERPL CALC-SCNC: 11 MMOL/L
AST SERPL-CCNC: 23 U/L
BASOPHILS # BLD AUTO: 0.04 K/UL
BASOPHILS NFR BLD AUTO: 0.8 %
BILIRUB SERPL-MCNC: 0.4 MG/DL
BUN SERPL-MCNC: 16 MG/DL
CALCIUM SERPL-MCNC: 9.4 MG/DL
CHLORIDE SERPL-SCNC: 103 MMOL/L
CHOLEST SERPL-MCNC: 150 MG/DL
CO2 SERPL-SCNC: 25 MMOL/L
CREAT SERPL-MCNC: 0.63 MG/DL
EGFR: 88 ML/MIN/1.73M2
EOSINOPHIL # BLD AUTO: 0.17 K/UL
EOSINOPHIL NFR BLD AUTO: 3.5 %
ESTIMATED AVERAGE GLUCOSE: 103 MG/DL
GLUCOSE SERPL-MCNC: 88 MG/DL
HBA1C MFR BLD HPLC: 5.2 %
HCT VFR BLD CALC: 40.7 %
HDLC SERPL-MCNC: 64 MG/DL
HGB BLD-MCNC: 12.4 G/DL
IMM GRANULOCYTES NFR BLD AUTO: 0.2 %
LDLC SERPL CALC-MCNC: 69 MG/DL
LYMPHOCYTES # BLD AUTO: 1.01 K/UL
LYMPHOCYTES NFR BLD AUTO: 20.9 %
MAN DIFF?: NORMAL
MCHC RBC-ENTMCNC: 28.6 PG
MCHC RBC-ENTMCNC: 30.5 GM/DL
MCV RBC AUTO: 93.8 FL
MONOCYTES # BLD AUTO: 0.83 K/UL
MONOCYTES NFR BLD AUTO: 17.1 %
NEUTROPHILS # BLD AUTO: 2.78 K/UL
NEUTROPHILS NFR BLD AUTO: 57.5 %
NONHDLC SERPL-MCNC: 86 MG/DL
PLATELET # BLD AUTO: 200 K/UL
POTASSIUM SERPL-SCNC: 5 MMOL/L
PROT SERPL-MCNC: 6.4 G/DL
RBC # BLD: 4.34 M/UL
RBC # FLD: 16.1 %
SODIUM SERPL-SCNC: 139 MMOL/L
TRIGL SERPL-MCNC: 92 MG/DL
TSH SERPL-ACNC: 0.82 UIU/ML
VIT B12 SERPL-MCNC: 502 PG/ML
WBC # FLD AUTO: 4.84 K/UL

## 2024-08-12 ENCOUNTER — APPOINTMENT (OUTPATIENT)
Dept: CT IMAGING | Facility: CLINIC | Age: 84
End: 2024-08-12
Payer: MEDICARE

## 2024-08-12 PROCEDURE — 73700 CT LOWER EXTREMITY W/O DYE: CPT | Mod: RT,MH

## 2024-08-12 PROCEDURE — 76376 3D RENDER W/INTRP POSTPROCES: CPT | Mod: MH

## 2024-09-06 ENCOUNTER — APPOINTMENT (OUTPATIENT)
Dept: PULMONOLOGY | Facility: CLINIC | Age: 84
End: 2024-09-06

## 2024-09-16 ENCOUNTER — LABORATORY RESULT (OUTPATIENT)
Age: 84
End: 2024-09-16

## 2024-09-16 ENCOUNTER — APPOINTMENT (OUTPATIENT)
Dept: INTERNAL MEDICINE | Facility: CLINIC | Age: 84
End: 2024-09-16
Payer: MEDICARE

## 2024-09-16 VITALS
BODY MASS INDEX: 23.04 KG/M2 | RESPIRATION RATE: 12 BRPM | WEIGHT: 130 LBS | HEIGHT: 63 IN | SYSTOLIC BLOOD PRESSURE: 124 MMHG | DIASTOLIC BLOOD PRESSURE: 60 MMHG

## 2024-09-16 VITALS — DIASTOLIC BLOOD PRESSURE: 60 MMHG | SYSTOLIC BLOOD PRESSURE: 100 MMHG

## 2024-09-16 DIAGNOSIS — D64.9 ANEMIA, UNSPECIFIED: ICD-10-CM

## 2024-09-16 DIAGNOSIS — I10 ESSENTIAL (PRIMARY) HYPERTENSION: ICD-10-CM

## 2024-09-16 DIAGNOSIS — R19.7 DIARRHEA, UNSPECIFIED: ICD-10-CM

## 2024-09-16 DIAGNOSIS — K56.2 VOLVULUS: ICD-10-CM

## 2024-09-16 PROCEDURE — 36415 COLL VENOUS BLD VENIPUNCTURE: CPT

## 2024-09-16 PROCEDURE — G2211 COMPLEX E/M VISIT ADD ON: CPT

## 2024-09-16 PROCEDURE — 99214 OFFICE O/P EST MOD 30 MIN: CPT

## 2024-09-16 RX ORDER — DIPHENOXYLATE HYDROCHLORIDE AND ATROPINE SULFATE 2.5; .025 MG/1; MG/1
2.5-0.025 TABLET ORAL
Qty: 1 | Refills: 0 | Status: ACTIVE | COMMUNITY
Start: 2024-09-16 | End: 1900-01-01

## 2024-09-16 NOTE — PHYSICAL EXAM
[Non-distended] : non-distended [No Rash] : no rash [Normal Gait] : normal gait [Normal Mood] : the mood was normal [de-identified] : friendly [de-identified] : BLE varicosities L>R [Normal] : normal rate, regular rhythm, normal S1 and S2 and no murmur heard [No Edema] : there was no peripheral edema [Soft] : abdomen soft [Non Tender] : non-tender

## 2024-09-16 NOTE — PHYSICAL EXAM
[Non-distended] : non-distended [No Rash] : no rash [Normal Gait] : normal gait [Normal Mood] : the mood was normal [de-identified] : friendly [de-identified] : BLE varicosities L>R [Normal] : normal rate, regular rhythm, normal S1 and S2 and no murmur heard [No Edema] : there was no peripheral edema [Soft] : abdomen soft [Non Tender] : non-tender

## 2024-09-16 NOTE — PHYSICAL EXAM
[Non-distended] : non-distended [No Rash] : no rash [Normal Gait] : normal gait [Normal Mood] : the mood was normal [de-identified] : friendly [de-identified] : BLE varicosities L>R [Normal] : normal rate, regular rhythm, normal S1 and S2 and no murmur heard [No Edema] : there was no peripheral edema [Soft] : abdomen soft [Non Tender] : non-tender

## 2024-09-17 LAB
ALBUMIN SERPL ELPH-MCNC: 4 G/DL
ALP BLD-CCNC: 76 U/L
ALT SERPL-CCNC: 9 U/L
ANION GAP SERPL CALC-SCNC: 14 MMOL/L
AST SERPL-CCNC: 21 U/L
BASOPHILS # BLD AUTO: 0.03 K/UL
BASOPHILS NFR BLD AUTO: 0.7 %
BILIRUB SERPL-MCNC: 0.3 MG/DL
BUN SERPL-MCNC: 10 MG/DL
CALCIUM SERPL-MCNC: 8.9 MG/DL
CHLORIDE SERPL-SCNC: 104 MMOL/L
CO2 SERPL-SCNC: 20 MMOL/L
CREAT SERPL-MCNC: 0.72 MG/DL
EGFR: 82 ML/MIN/1.73M2
EOSINOPHIL # BLD AUTO: 0.97 K/UL
EOSINOPHIL NFR BLD AUTO: 21.7 %
GLUCOSE SERPL-MCNC: 92 MG/DL
HCT VFR BLD CALC: 34.6 %
HGB BLD-MCNC: 9.4 G/DL
IMM GRANULOCYTES NFR BLD AUTO: 0.2 %
LYMPHOCYTES # BLD AUTO: 0.71 K/UL
LYMPHOCYTES NFR BLD AUTO: 15.9 %
MAGNESIUM SERPL-MCNC: 1.7 MG/DL
MAN DIFF?: NORMAL
MCHC RBC-ENTMCNC: 27.2 GM/DL
MCHC RBC-ENTMCNC: 28 PG
MCV RBC AUTO: 103 FL
MONOCYTES # BLD AUTO: 0.82 K/UL
MONOCYTES NFR BLD AUTO: 18.4 %
NEUTROPHILS # BLD AUTO: 1.92 K/UL
NEUTROPHILS NFR BLD AUTO: 43.1 %
PLATELET # BLD AUTO: 151 K/UL
POTASSIUM SERPL-SCNC: 4.1 MMOL/L
PROT SERPL-MCNC: 5.9 G/DL
RBC # BLD: 3.36 M/UL
RBC # FLD: 15.2 %
SODIUM SERPL-SCNC: 138 MMOL/L
WBC # FLD AUTO: 4.46 K/UL

## 2024-09-24 NOTE — REASON FOR VISIT
Follow-Up] : a follow-up visit [FreeTextEntry2] : Chest Congestion , Bronchitis

## 2024-11-21 ENCOUNTER — APPOINTMENT (OUTPATIENT)
Dept: PULMONOLOGY | Facility: CLINIC | Age: 84
End: 2024-11-21
Payer: MEDICARE

## 2024-11-21 VITALS — OXYGEN SATURATION: 100 % | SYSTOLIC BLOOD PRESSURE: 137 MMHG | DIASTOLIC BLOOD PRESSURE: 66 MMHG | HEART RATE: 63 BPM

## 2024-11-21 DIAGNOSIS — J44.9 CHRONIC OBSTRUCTIVE PULMONARY DISEASE, UNSPECIFIED: ICD-10-CM

## 2024-11-21 DIAGNOSIS — R94.2 ABNORMAL RESULTS OF PULMONARY FUNCTION STUDIES: ICD-10-CM

## 2024-11-21 PROCEDURE — 71046 X-RAY EXAM CHEST 2 VIEWS: CPT

## 2024-11-21 PROCEDURE — 94729 DIFFUSING CAPACITY: CPT

## 2024-11-21 PROCEDURE — 94010 BREATHING CAPACITY TEST: CPT

## 2024-11-21 PROCEDURE — 90662 IIV NO PRSV INCREASED AG IM: CPT

## 2024-11-21 PROCEDURE — 99214 OFFICE O/P EST MOD 30 MIN: CPT | Mod: 25

## 2024-11-21 PROCEDURE — G0008: CPT

## 2024-11-21 PROCEDURE — 94727 GAS DIL/WSHOT DETER LNG VOL: CPT

## 2025-01-14 ENCOUNTER — APPOINTMENT (OUTPATIENT)
Dept: DERMATOLOGY | Facility: CLINIC | Age: 85
End: 2025-01-14
Payer: MEDICARE

## 2025-01-14 VITALS — BODY MASS INDEX: 23.04 KG/M2 | WEIGHT: 130 LBS | HEIGHT: 63 IN

## 2025-01-14 DIAGNOSIS — L98.499 NON-PRESSURE CHRONIC ULCER OF SKIN OF OTHER SITES WITH UNSPECIFIED SEVERITY: ICD-10-CM

## 2025-01-14 DIAGNOSIS — L30.9 DERMATITIS, UNSPECIFIED: ICD-10-CM

## 2025-01-14 PROCEDURE — 99204 OFFICE O/P NEW MOD 45 MIN: CPT

## 2025-01-14 RX ORDER — CEPHALEXIN 500 MG/1
500 TABLET ORAL
Qty: 10 | Refills: 0 | Status: ACTIVE | COMMUNITY
Start: 2025-01-14 | End: 1900-01-01

## 2025-01-14 RX ORDER — PREDNISONE 20 MG/1
20 TABLET ORAL
Qty: 14 | Refills: 0 | Status: ACTIVE | COMMUNITY
Start: 2025-01-14 | End: 1900-01-01

## 2025-01-28 ENCOUNTER — APPOINTMENT (OUTPATIENT)
Dept: DERMATOLOGY | Facility: CLINIC | Age: 85
End: 2025-01-28
Payer: MEDICARE

## 2025-01-28 DIAGNOSIS — L28.1 PRURIGO NODULARIS: ICD-10-CM

## 2025-01-28 DIAGNOSIS — L98.499 NON-PRESSURE CHRONIC ULCER OF SKIN OF OTHER SITES WITH UNSPECIFIED SEVERITY: ICD-10-CM

## 2025-01-28 PROCEDURE — 99214 OFFICE O/P EST MOD 30 MIN: CPT

## 2025-01-28 RX ORDER — DUPILUMAB 300 MG/2ML
300 INJECTION, SOLUTION SUBCUTANEOUS
Qty: 1 | Refills: 3 | Status: ACTIVE | COMMUNITY
Start: 2025-01-28 | End: 1900-01-01

## 2025-01-28 RX ORDER — DUPILUMAB 300 MG/2ML
300 INJECTION, SOLUTION SUBCUTANEOUS
Qty: 2 | Refills: 0 | Status: ACTIVE | COMMUNITY
Start: 2025-01-28

## 2025-01-30 ENCOUNTER — NON-APPOINTMENT (OUTPATIENT)
Age: 85
End: 2025-01-30

## 2025-02-04 RX ORDER — TRIAMCINOLONE ACETONIDE 1 MG/G
0.1 OINTMENT TOPICAL
Qty: 1 | Refills: 0 | Status: ACTIVE | COMMUNITY
Start: 2025-02-04 | End: 1900-01-01

## 2025-02-11 ENCOUNTER — APPOINTMENT (OUTPATIENT)
Dept: INTERNAL MEDICINE | Facility: CLINIC | Age: 85
End: 2025-02-11

## 2025-02-24 ENCOUNTER — RX RENEWAL (OUTPATIENT)
Age: 85
End: 2025-02-24

## 2025-02-24 ENCOUNTER — APPOINTMENT (OUTPATIENT)
Dept: PULMONOLOGY | Facility: CLINIC | Age: 85
End: 2025-02-24
Payer: MEDICARE

## 2025-02-24 VITALS — SYSTOLIC BLOOD PRESSURE: 133 MMHG | OXYGEN SATURATION: 97 % | HEART RATE: 59 BPM | DIASTOLIC BLOOD PRESSURE: 76 MMHG

## 2025-02-24 DIAGNOSIS — T17.500A UNSPECIFIED FOREIGN BODY IN BRONCHUS CAUSING ASPHYXIATION, INITIAL ENCOUNTER: ICD-10-CM

## 2025-02-24 DIAGNOSIS — J44.9 CHRONIC OBSTRUCTIVE PULMONARY DISEASE, UNSPECIFIED: ICD-10-CM

## 2025-02-24 DIAGNOSIS — R94.2 ABNORMAL RESULTS OF PULMONARY FUNCTION STUDIES: ICD-10-CM

## 2025-02-24 PROCEDURE — 94618 PULMONARY STRESS TESTING: CPT

## 2025-02-24 PROCEDURE — 94010 BREATHING CAPACITY TEST: CPT

## 2025-02-24 PROCEDURE — 99214 OFFICE O/P EST MOD 30 MIN: CPT | Mod: 25

## 2025-02-24 RX ORDER — FLUTICASONE PROPIONATE 50 UG/1
50 SPRAY, METERED NASAL
Qty: 48 | Refills: 0 | Status: ACTIVE | COMMUNITY
Start: 2025-02-24 | End: 1900-01-01

## 2025-02-24 RX ORDER — IPRATROPIUM BROMIDE 42 UG/1
0.06 SPRAY, METERED NASAL
Qty: 1 | Refills: 3 | Status: ACTIVE | COMMUNITY
Start: 2025-02-24 | End: 1900-01-01

## 2025-05-13 ENCOUNTER — RX RENEWAL (OUTPATIENT)
Age: 85
End: 2025-05-13

## 2025-05-29 ENCOUNTER — NON-APPOINTMENT (OUTPATIENT)
Age: 85
End: 2025-05-29

## 2025-05-29 ENCOUNTER — APPOINTMENT (OUTPATIENT)
Dept: DERMATOLOGY | Facility: CLINIC | Age: 85
End: 2025-05-29
Payer: MEDICARE

## 2025-05-29 VITALS — BODY MASS INDEX: 22.75 KG/M2 | HEIGHT: 63.5 IN | WEIGHT: 130 LBS

## 2025-05-29 DIAGNOSIS — L30.9 DERMATITIS, UNSPECIFIED: ICD-10-CM

## 2025-05-29 DIAGNOSIS — L28.1 PRURIGO NODULARIS: ICD-10-CM

## 2025-05-29 DIAGNOSIS — I83.009 VARICOSE VEINS OF UNSPECIFIED LOWER EXTREMITY WITH ULCER OF UNSPECIFIED SITE: ICD-10-CM

## 2025-05-29 DIAGNOSIS — L97.909 VARICOSE VEINS OF UNSPECIFIED LOWER EXTREMITY WITH ULCER OF UNSPECIFIED SITE: ICD-10-CM

## 2025-05-29 DIAGNOSIS — L98.499 NON-PRESSURE CHRONIC ULCER OF SKIN OF OTHER SITES WITH UNSPECIFIED SEVERITY: ICD-10-CM

## 2025-05-29 PROCEDURE — 99214 OFFICE O/P EST MOD 30 MIN: CPT

## 2025-05-29 RX ORDER — TRIAMCINOLONE ACETONIDE 5 MG/G
0.5 OINTMENT TOPICAL
Qty: 80 | Refills: 3 | Status: ACTIVE | COMMUNITY
Start: 2025-05-29 | End: 1900-01-01

## 2025-05-31 ENCOUNTER — RX RENEWAL (OUTPATIENT)
Age: 85
End: 2025-05-31

## 2025-06-02 ENCOUNTER — APPOINTMENT (OUTPATIENT)
Dept: PULMONOLOGY | Facility: CLINIC | Age: 85
End: 2025-06-02

## 2025-06-03 ENCOUNTER — APPOINTMENT (OUTPATIENT)
Dept: WOUND CARE | Facility: HOSPITAL | Age: 85
End: 2025-06-03
Payer: MEDICARE

## 2025-06-03 ENCOUNTER — OUTPATIENT (OUTPATIENT)
Dept: OUTPATIENT SERVICES | Facility: HOSPITAL | Age: 85
LOS: 1 days | End: 2025-06-03
Payer: MEDICARE

## 2025-06-03 VITALS
BODY MASS INDEX: 22.75 KG/M2 | HEART RATE: 70 BPM | OXYGEN SATURATION: 94 % | SYSTOLIC BLOOD PRESSURE: 101 MMHG | TEMPERATURE: 97.7 F | RESPIRATION RATE: 14 BRPM | DIASTOLIC BLOOD PRESSURE: 63 MMHG | WEIGHT: 130 LBS | HEIGHT: 63.5 IN

## 2025-06-03 DIAGNOSIS — L97.822 NON-PRESSURE CHRONIC ULCER OF OTHER PART OF LEFT LOWER LEG WITH FAT LAYER EXPOSED: ICD-10-CM

## 2025-06-03 DIAGNOSIS — L97.512 NON-PRESSURE CHRONIC ULCER OF OTHER PART OF RIGHT FOOT WITH FAT LAYER EXPOSED: ICD-10-CM

## 2025-06-03 DIAGNOSIS — S91.309A UNSPECIFIED OPEN WOUND, UNSPECIFIED FOOT, INITIAL ENCOUNTER: ICD-10-CM

## 2025-06-03 DIAGNOSIS — L97.519 VARICOSE VEINS OF RIGHT LOWER EXTREMITY WITH BOTH ULCER OTHER PART OF FOOT AND INFLAMMATION: ICD-10-CM

## 2025-06-03 DIAGNOSIS — I83.215 VARICOSE VEINS OF RIGHT LOWER EXTREMITY WITH BOTH ULCER OTHER PART OF FOOT AND INFLAMMATION: ICD-10-CM

## 2025-06-03 DIAGNOSIS — I83.015 VARICOSE VEINS OF RIGHT LOWER EXTREMITY WITH ULCER OTHER PART OF FOOT: ICD-10-CM

## 2025-06-03 DIAGNOSIS — L97.812 NON-PRESSURE CHRONIC ULCER OF OTHER PART OF RIGHT LOWER LEG WITH FAT LAYER EXPOSED: ICD-10-CM

## 2025-06-03 DIAGNOSIS — L97.519 VARICOSE VEINS OF RIGHT LOWER EXTREMITY WITH ULCER OTHER PART OF FOOT: ICD-10-CM

## 2025-06-03 PROCEDURE — G0463: CPT

## 2025-06-03 PROCEDURE — 99213 OFFICE O/P EST LOW 20 MIN: CPT

## 2025-06-03 PROCEDURE — 99203 OFFICE O/P NEW LOW 30 MIN: CPT

## 2025-06-03 RX ORDER — CLOTRIMAZOLE AND BETAMETHASONE DIPROPIONATE 10; .5 MG/G; MG/G
1-0.05 CREAM TOPICAL TWICE DAILY
Qty: 1 | Refills: 3 | Status: ACTIVE | COMMUNITY
Start: 2025-06-03 | End: 1900-01-01

## 2025-06-03 RX ORDER — COLLAGENASE SANTYL 250 [ARB'U]/G
250 OINTMENT TOPICAL DAILY
Qty: 1 | Refills: 1 | Status: ACTIVE | COMMUNITY
Start: 2025-06-03 | End: 1900-01-01

## 2025-06-04 DIAGNOSIS — Z80.3 FAMILY HISTORY OF MALIGNANT NEOPLASM OF BREAST: ICD-10-CM

## 2025-06-04 DIAGNOSIS — Z80.1 FAMILY HISTORY OF MALIGNANT NEOPLASM OF TRACHEA, BRONCHUS AND LUNG: ICD-10-CM

## 2025-06-04 DIAGNOSIS — L97.812 NON-PRESSURE CHRONIC ULCER OF OTHER PART OF RIGHT LOWER LEG WITH FAT LAYER EXPOSED: ICD-10-CM

## 2025-06-04 DIAGNOSIS — Z86.73 PERSONAL HISTORY OF TRANSIENT ISCHEMIC ATTACK (TIA), AND CEREBRAL INFARCTION WITHOUT RESIDUAL DEFICITS: ICD-10-CM

## 2025-06-04 DIAGNOSIS — L97.822 NON-PRESSURE CHRONIC ULCER OF OTHER PART OF LEFT LOWER LEG WITH FAT LAYER EXPOSED: ICD-10-CM

## 2025-06-04 DIAGNOSIS — Z79.01 LONG TERM (CURRENT) USE OF ANTICOAGULANTS: ICD-10-CM

## 2025-06-04 DIAGNOSIS — Z90.710 ACQUIRED ABSENCE OF BOTH CERVIX AND UTERUS: ICD-10-CM

## 2025-06-04 DIAGNOSIS — Z87.440 PERSONAL HISTORY OF URINARY (TRACT) INFECTIONS: ICD-10-CM

## 2025-06-04 DIAGNOSIS — I25.10 ATHEROSCLEROTIC HEART DISEASE OF NATIVE CORONARY ARTERY WITHOUT ANGINA PECTORIS: ICD-10-CM

## 2025-06-04 DIAGNOSIS — Z95.5 PRESENCE OF CORONARY ANGIOPLASTY IMPLANT AND GRAFT: ICD-10-CM

## 2025-06-04 DIAGNOSIS — Z79.52 LONG TERM (CURRENT) USE OF SYSTEMIC STEROIDS: ICD-10-CM

## 2025-06-04 DIAGNOSIS — E03.9 HYPOTHYROIDISM, UNSPECIFIED: ICD-10-CM

## 2025-06-04 DIAGNOSIS — I48.0 PAROXYSMAL ATRIAL FIBRILLATION: ICD-10-CM

## 2025-06-04 DIAGNOSIS — Z88.5 ALLERGY STATUS TO NARCOTIC AGENT: ICD-10-CM

## 2025-06-04 DIAGNOSIS — I83.015 VARICOSE VEINS OF RIGHT LOWER EXTREMITY WITH ULCER OTHER PART OF FOOT: ICD-10-CM

## 2025-06-04 DIAGNOSIS — L97.512 NON-PRESSURE CHRONIC ULCER OF OTHER PART OF RIGHT FOOT WITH FAT LAYER EXPOSED: ICD-10-CM

## 2025-06-04 DIAGNOSIS — Z95.0 PRESENCE OF CARDIAC PACEMAKER: ICD-10-CM

## 2025-06-04 DIAGNOSIS — I10 ESSENTIAL (PRIMARY) HYPERTENSION: ICD-10-CM

## 2025-06-04 DIAGNOSIS — D64.9 ANEMIA, UNSPECIFIED: ICD-10-CM

## 2025-06-04 DIAGNOSIS — Z87.01 PERSONAL HISTORY OF PNEUMONIA (RECURRENT): ICD-10-CM

## 2025-06-09 ENCOUNTER — NON-APPOINTMENT (OUTPATIENT)
Age: 85
End: 2025-06-09

## 2025-06-11 ENCOUNTER — APPOINTMENT (OUTPATIENT)
Dept: WOUND CARE | Facility: HOSPITAL | Age: 85
End: 2025-06-11

## 2025-06-21 ENCOUNTER — RX RENEWAL (OUTPATIENT)
Age: 85
End: 2025-06-21

## 2025-07-16 NOTE — HISTORY OF PRESENT ILLNESS
[Never] : never [TextBox_4] : Returns again today because she states she still having this persistent pain Workup as noted below Noted she is on Xarelto for anticoagulation No fever chills or sweats   Chart review subsequent [facility] 1/27/24 told pleurisy and coronavirus tylenol drip completed prednisone po s/p fall slip at home fx ORTHO placed in splint but states no fx  Dec 15 2023 SF A FIB  followed  fever post procedure Fe infusion with RTX management Dr Ritter Admission [facility] Allergic reaction shortness of breath Chest x-ray January 9, 2024 Cardiomegaly Left pleural effusion Reported left hemidiaphragm obscured likely effusion/atelectasis Clinical indication wheezing Loop recorder identified Addendum Chart review Admission Atrial fibrillation regula rate control Acute hypoxemic respiratory failure Underlying history of COPD asthma 83-year-old female past medical hypertension hyperlipidemia coronary artery disease history of PCI COPD Hashimoto's thyroiditis spinal stenosis TIAs paroxysmal atrial fibrillation Raghav was concerned that she was having an allergic reaction receiving an iron infusion as per the second time She felt increased work of breathing and shortness of breath Received epi and Solu-Medrol with some resultant shaking for a few minutes Noted to be in atrial fibrillation Positive low-grade fever temperature 100.3 Hemodynamically stable blood pressure 121/69 O2 saturations 3 L Venturi mask 97% Respiratory exam did not report a cough wheeze but positive shortness of breath Reported clear lungs Cardiac S1-S2 irregularly irregular Laboratory data WBC 15.68 Hemoglobin hematocrit 48.3 platelets 392,000 Serum potassium 3.8 Creatinine 0.58 Serum calcium 9.0 Mild elevation liver enzymes  ALT 52 Alkaline phosphatase 224 Total bilirubin normal Venous blood gas pH is initially 7.22 follow-up 7.39 Lactate one 3.5 follow-up 1.7 Troponin 2019 and 24 normal range Noted in body of history physical chest x-ray bilateral perihilar interstitial opacities felt secondary to mild pulmonary edema with small left pleural effusion EKG atrial fibrillation Overall impression Atrial fibrillation rapid ventricular response new Treatment included diltiazem 60 mg by mouth 20 mg IV At home had been on metoprolol 50 mg twice daily Xarelto on hold Heparin drip Cardiology consultation and loop recorder interrogation integration Acute respiratory failure Rule out secondary to pulmonary edema Treatments included DuoNeb epinephrine Benadryl Pepcid Treatment protocol with antibiotic Rocephin Zithromax Emergency department did report wheezing on exam not noted in the body of the report reviewed Treatment including DuoNeb and 40 mg prednisone daily oxygen to be weaned Admission date January 8 discharged January 14, 2024 Patient was admitted with atrial fibrillation Acute hypoxemic respiratory tract failure secondary to underlying COPD asthma Patient will be discharged on 75 mg of Lopressor twice daily Acute respiratory failure with hypoxemia Chest x-ray demonstrated bilateral perihilar interstitial opacities most consistent with pulmonary edema small left effusion Treatment it did include DuoNeb Rocephin and Zithromax with interval improvement Asthma component as noted treatment above Iron deficiency anemia Hypertension Hypothyroidism on levothyroxine 75 mcg daily Hyperlipidemia Crestor 10 mg daily Chest x-ray January 8, 2024 Cardiomegaly with left pleural effusion  states cardiac off lasix sec low Na but at present OFF HCTZ Lisinpril 20 mg BID  Metoprol 25 BID  Norvasc 2.5  post visit Cardiology [facility] no edema  GI virus management with Dr Frost HEME noted completed EGD and capsule study  Anemia (285.9) (D64.9) 81yo F with HTN, HLD, hypothyroidism here for further management of iron deficiency anemia.   Noted Renal Dr Rico to manage hyponatremia notes off diuretic some inc SOB pos croupy cough pos mucous green no fever chills  noted per cardiac HGB 10.0 seen GI Héctor Frost recommended endoscopy but at present at [facility]  9/26/2 EF > 75 % no reported valve disease or Pul HTN  Post hospitalization [facility] 10/30/2022 Get MRI told it was possible TIA a and was placed on Plavix Follow-up neurology cardiology renal noted that she was off her diuretic and the serum sodium was 135 in the hospital pos SOB DODGE HTN  rx meds Hydralazine lisinopril Metoprolol  [facility] review of imaging studies March 16, 2022 CT pelvis No fractures Lumbar disc disease Moderate to severe spinal stenosis CT cervical spine and head negative X-ray of hip March 14, 2022 no displaced fracture No chest x-ray reviewed available for review  Discharge medication reviewed Aspirin 81 mg Density 200 mg 1 deficit Zofran Pantoprazole 40 mg Hydralazine 50 mg 3 times daily As needed Xanax Gabapentin 600 mg 3 times daily Lisinopril 20 mg twice daily Metoprolol 25 mg twice daily Was was also treated with nitrofurantoin   completed PFIZER  COVID vaccine [Stable] : are stable [None] : ~He/She~ has no significant interval events [Difficulty Breathing During Exertion] : denies dyspnea on exertion [Feelings Of Weakness On Exertion] : denies exercise intolerance [Cough] : denies coughing [Wheezing] : denies wheezing [Regional Soft Tissue Swelling Both Lower Extremities] : denies lower extremity edema [Chest Pain Or Discomfort] : denies chest pain [Fever] : denies fever [Wt Gain ___ Lbs] : no recent weight gain [Wt Loss ___ Lbs] : no recent weight loss [Oxygen] : the patient uses no supplemental oxygen How Severe Is It?: moderate Is This A New Presentation, Or A Follow-Up?: Rash

## 2025-07-24 ENCOUNTER — APPOINTMENT (OUTPATIENT)
Dept: DERMATOLOGY | Facility: CLINIC | Age: 85
End: 2025-07-24

## 2025-07-25 ENCOUNTER — APPOINTMENT (OUTPATIENT)
Dept: PULMONOLOGY | Facility: CLINIC | Age: 85
End: 2025-07-25
Payer: MEDICARE

## 2025-07-25 VITALS — SYSTOLIC BLOOD PRESSURE: 181 MMHG | DIASTOLIC BLOOD PRESSURE: 85 MMHG

## 2025-07-25 VITALS
HEART RATE: 58 BPM | WEIGHT: 135 LBS | RESPIRATION RATE: 18 BRPM | BODY MASS INDEX: 23.05 KG/M2 | OXYGEN SATURATION: 97 % | HEIGHT: 64 IN

## 2025-07-25 DIAGNOSIS — R94.2 ABNORMAL RESULTS OF PULMONARY FUNCTION STUDIES: ICD-10-CM

## 2025-07-25 DIAGNOSIS — T17.500A UNSPECIFIED FOREIGN BODY IN BRONCHUS CAUSING ASPHYXIATION, INITIAL ENCOUNTER: ICD-10-CM

## 2025-07-25 DIAGNOSIS — J44.9 CHRONIC OBSTRUCTIVE PULMONARY DISEASE, UNSPECIFIED: ICD-10-CM

## 2025-07-25 PROCEDURE — 94010 BREATHING CAPACITY TEST: CPT

## 2025-07-25 PROCEDURE — 94727 GAS DIL/WSHOT DETER LNG VOL: CPT

## 2025-07-25 PROCEDURE — 99214 OFFICE O/P EST MOD 30 MIN: CPT | Mod: 25

## 2025-07-25 PROCEDURE — ZZZZZ: CPT

## 2025-07-25 PROCEDURE — 94729 DIFFUSING CAPACITY: CPT
